# Patient Record
Sex: MALE | Race: WHITE | NOT HISPANIC OR LATINO | Employment: FULL TIME | URBAN - METROPOLITAN AREA
[De-identification: names, ages, dates, MRNs, and addresses within clinical notes are randomized per-mention and may not be internally consistent; named-entity substitution may affect disease eponyms.]

---

## 2017-01-06 ENCOUNTER — TRANSFERRED RECORDS (OUTPATIENT)
Dept: HEALTH INFORMATION MANAGEMENT | Facility: CLINIC | Age: 37
End: 2017-01-06

## 2017-02-13 ENCOUNTER — TRANSFERRED RECORDS (OUTPATIENT)
Dept: HEALTH INFORMATION MANAGEMENT | Facility: CLINIC | Age: 37
End: 2017-02-13

## 2017-03-09 ENCOUNTER — OFFICE VISIT (OUTPATIENT)
Dept: PEDIATRICS | Facility: CLINIC | Age: 37
End: 2017-03-09
Payer: COMMERCIAL

## 2017-03-09 VITALS
BODY MASS INDEX: 32.58 KG/M2 | WEIGHT: 245.8 LBS | OXYGEN SATURATION: 99 % | SYSTOLIC BLOOD PRESSURE: 126 MMHG | DIASTOLIC BLOOD PRESSURE: 82 MMHG | HEART RATE: 69 BPM | TEMPERATURE: 97 F | HEIGHT: 73 IN

## 2017-03-09 DIAGNOSIS — M54.16 LEFT LUMBAR RADICULOPATHY: Primary | ICD-10-CM

## 2017-03-09 PROCEDURE — 99214 OFFICE O/P EST MOD 30 MIN: CPT | Performed by: INTERNAL MEDICINE

## 2017-03-09 RX ORDER — PREDNISONE 20 MG/1
40 TABLET ORAL DAILY
Qty: 10 TABLET | Refills: 0 | Status: SHIPPED | OUTPATIENT
Start: 2017-03-09 | End: 2017-03-14

## 2017-03-09 RX ORDER — IBUPROFEN 200 MG
800 TABLET ORAL EVERY 4 HOURS PRN
COMMUNITY
End: 2017-09-30

## 2017-03-09 NOTE — PROGRESS NOTES
"  SUBJECTIVE:                                                    Barrington Cárdenas is a 36 year old male who presents to clinic today for the following health issues:      Back Pain      Duration: 1 month        Specific cause: unsure    Description:   Location of pain: low back left  Character of pain: dull ache and intermittent  Pain radiation:radiates into the left buttocks and radiates into the left leg. More of a tingling sensation  New numbness or weakness in legs, not attributed to pain:  YES    Intensity: Currently 0/10, At its worst 5/10    History:   Pain interferes with job: YES  History of back problems: last summer  Any previous MRI or X-rays: None  Sees a specialist for back pain:  No  Therapies tried without relief: none    Alleviating factors:   Improved by: chiropractor and NSAIDs      Precipitating factors:  Worsened by: Standing and Sitting    Functional and Psychosocial Screen (Kaylin STarT Back):      Most recent score:    KAYLIN START BACK TOTAL SCORE 3/9/2017   Total Score (all 9) 4          Accompanying Signs & Symptoms:         Began on 2/18/17; back aches resumed, despite having seen chiropracter.      Pain on left flank and buttock, as well as tingling down left leg.  Feels like foot is asleep.  This past week then developed some worsening symptoms, felt \"not right.\"  Never had this before.  Tried otc ibuprofen only; alternating hot and cold. Pain has improved.      No history of trauma or sports injury; did have whiplash in 2003.      WOrks at a desk job.    Problem list and histories reviewed & adjusted, as indicated.  Additional history: as documented    Patient Active Problem List   Diagnosis     Tick bite     Past Surgical History   Procedure Laterality Date     Surgical history of -        Tonsils, age 3     Tonsillectomy         Social History   Substance Use Topics     Smoking status: Never Smoker     Smokeless tobacco: Former User     Quit date: 8/10/2005     Alcohol use Yes      " "Comment: socially     Family History   Problem Relation Age of Onset     Family history unknown: Yes         Current Outpatient Prescriptions   Medication Sig Dispense Refill     Cyanocobalamin (VITAMIN B 12 PO)        ibuprofen (ADVIL/MOTRIN) 200 MG tablet Take 800 mg by mouth every 4 hours as needed for mild pain       predniSONE (DELTASONE) 20 MG tablet Take 2 tablets (40 mg) by mouth daily for 5 days 10 tablet 0     calcium carbonate (OS-MISA 500 MG [place]. [place]) 500 MG tablet Take 500 mg by mouth 2 times daily       OMEPRAZOLE PO        No Known Allergies  BP Readings from Last 3 Encounters:   03/09/17 126/82   08/30/16 128/82   04/11/16 128/86    Wt Readings from Last 3 Encounters:   03/09/17 245 lb 12.8 oz (111.5 kg)   08/30/16 246 lb 3.2 oz (111.7 kg)   04/11/16 245 lb 6 oz (111.3 kg)                  Labs reviewed in EPIC    Reviewed and updated as needed this visit by clinical staff  Tobacco  Allergies  Meds  Med Hx  Surg Hx  Fam Hx  Soc Hx      Reviewed and updated as needed this visit by Provider         ROS:  C: NEGATIVE for fever, chills, change in weight  E/M: NEGATIVE for ear, mouth and throat problems  R: NEGATIVE for significant cough or SOB  CV: NEGATIVE for chest pain, palpitations or peripheral edema    OBJECTIVE:                                                    /82 (BP Location: Right arm, Patient Position: Chair, Cuff Size: Adult Large)  Pulse 69  Temp 97  F (36.1  C) (Tympanic)  Ht 6' 0.5\" (1.842 m)  Wt 245 lb 12.8 oz (111.5 kg)  SpO2 99%  BMI 32.88 kg/m2  Body mass index is 32.88 kg/(m^2).   GENERAL: healthy, alert, well nourished, well hydrated, no distress  HENT: ear canals- normal; TMs- normal; Nose- normal; Mouth- no ulcers, no lesions  NECK: no tenderness, no adenopathy, no asymmetry, no masses, no stiffness; thyroid- normal to palpation  RESP: lungs clear to auscultation - no rales, no rhonchi, no wheezes  CV: regular rates and rhythm, normal S1 S2, no S3 or S4 and no murmur, " no click or rub -  ABDOMEN: soft, no tenderness, no  hepatosplenomegaly, no masses, normal bowel sounds    Diagnostic test results:  Diagnostic Test Results:  none      ASSESSMENT/PLAN:                                                    1. Left lumbar radiculopathy    Consider MRI for any progression of weakness or failure to improve after 1 month.     Patient Instructions   Begin prednisone 40 mg daily for 5 days.    Then start Aleve (naproxen) 440 mg twice daily for 1 month.    Begin physical therapy; they will call you.    Marv Thao MD  Internal Medicine and Pediatrics        - predniSONE (DELTASONE) 20 MG tablet; Take 2 tablets (40 mg) by mouth daily for 5 days  Dispense: 10 tablet; Refill: 0  - [facility] PT, HAND, AND CHIROPRACTIC REFERRAL      See Patient Instructions    Marv Thao MD  [facility]

## 2017-03-09 NOTE — MR AVS SNAPSHOT
After Visit Summary   3/9/2017    Barrington Cárdenas    MRN: [id number]           Patient Information     Date Of Birth          1980        Visit Information        Provider Department      3/9/2017 11:00 AM Marv Thao MD [facility]        Today's Diagnoses     Left lumbar radiculopathy    -  1      Care Instructions    Begin prednisone 40 mg daily for 5 days.    Then start Aleve (naproxen) 440 mg twice daily for 1 month.    Begin physical therapy; they will call you.    Marv Thao MD  Internal Medicine and Pediatrics           Follow-ups after your visit        Additional Services     [facility] PT, HAND, AND CHIROPRACTIC REFERRAL       **This order will print in the [facility] Scheduling Office**    Physical Therapy, Hand Therapy and Chiropractic Care are available through:    *[facility]  *[facility]  *[facility]    Call one number to schedule at any of the above locations: [phone].    Your provider has referred you to: [facility] - PT and/or Chiropractic Care determined by clinical presentation at [facility] or [facility] Initial Visit    Indication/Reason for Referral: Low Back Pain and with radicular symptoms.   Onset of Illness: 1 mo  Therapy Orders: Evaluate and Treat  Special Programs: None  Special Request: None    Kaylin Sub-Score (Q5-9): 3 (03/09/17 1107)  Kaylin Total Score (all 9): 4 (03/09/17 1107)    Additional Comments for the Therapist or Chiropractor:     Please be aware that coverage of these services is subject to the terms and limitations of your health insurance plan.  Call member services at your health plan with any benefit or coverage questions.      Please bring the following to your appointment:    *Your personal calendar for scheduling future appointments  *Comfortable clothing                  Who to contact     If you have questions or need follow up information about today's clinic visit or your  "schedule please contact [facility] directly at [phone].  Normal or non-critical lab and imaging results will be communicated to you by MyChart, letter or phone within 4 business days after the clinic has received the results. If you do not hear from us within 7 days, please contact the clinic through WorldEscape[employer] or phone. If you have a critical or abnormal lab result, we will notify you by phone as soon as possible.  Submit refill requests through [employer] or call your pharmacy and they will forward the refill request to us. Please allow 3 business days for your refill to be completed.          Additional Information About Your Visit        WorldEscapeharSyrmo Information     [employer] gives you secure access to your electronic health record. If you see a primary care provider, you can also send messages to your care team and make appointments. If you have questions, please call your primary care clinic.  If you do not have a primary care provider, please call [phone] and they will assist you.        Care EveryWhere ID     This is your Care EveryWhere ID. This could be used by other organizations to access your [facility] medical records  [id number]        Your Vitals Were     Pulse Temperature Height Pulse Oximetry BMI (Body Mass Index)       69 97  F (36.1  C) (Tympanic) 6' 0.5\" (1.842 m) 99% 32.88 kg/m2        Blood Pressure from Last 3 Encounters:   03/09/17 126/82   08/30/16 128/82   04/11/16 128/86    Weight from Last 3 Encounters:   03/09/17 245 lb 12.8 oz (111.5 kg)   08/30/16 246 lb 3.2 oz (111.7 kg)   04/11/16 245 lb 6 oz (111.3 kg)              We Performed the Following     [facility] PT, HAND, AND CHIROPRACTIC REFERRAL          Today's Medication Changes          These changes are accurate as of: 3/9/17 11:23 AM.  If you have any questions, ask your nurse or doctor.               Start taking these medicines.        Dose/Directions    predniSONE 20 MG tablet   Commonly known as:  DELTASONE   Used for:  Left " lumbar radiculopathy   Started by:  Marv Thao MD        Dose:  40 mg   Take 2 tablets (40 mg) by mouth daily for 5 days   Quantity:  10 tablet   Refills:  0            Where to get your medicines      These medications were sent to [facility] - [place], [place] - [address] AT [facility] [address] [facility] [address]  [address], [facility] [address]     Phone:  [phone]     predniSONE 20 MG tablet                Primary Care Provider Office Phone # Fax #    Marv Thao [phone]       [facility] [address] [place] [address] [address] [place] [address]        Thank you!     Thank you for choosing [facility]  for your care. Our goal is always to provide you with excellent care. Hearing back from our patients is one way we can continue to improve our services. Please take a few minutes to complete the written survey that you may receive in the mail after your visit with us. Thank you!             Your Updated Medication List - Protect others around you: Learn how to safely use, store and throw away your medicines at www.disposemymeds.org.          This list is accurate as of: 3/9/17 11:23 AM.  Always use your most recent med list.                   Brand Name Dispense Instructions for use    calcium carbonate 500 MG tablet    OS-MISA 500 mg [place]. [place]     Take 500 mg by mouth 2 times daily       ibuprofen 200 MG tablet    ADVIL/MOTRIN     Take 800 mg by mouth every 4 hours as needed for mild pain       OMEPRAZOLE PO          predniSONE 20 MG tablet    DELTASONE    10 tablet    Take 2 tablets (40 mg) by mouth daily for 5 days       VITAMIN B 12 PO

## 2017-03-09 NOTE — NURSING NOTE
"Chief Complaint   Patient presents with     Back Pain       Initial /82 (BP Location: Right arm, Patient Position: Chair, Cuff Size: Adult Large)  Pulse 69  Temp 97  F (36.1  C) (Tympanic)  Ht 6' 0.5\" (1.842 m)  Wt 245 lb 12.8 oz (111.5 kg)  SpO2 99%  BMI 32.88 kg/m2 Estimated body mass index is 32.88 kg/(m^2) as calculated from the following:    Height as of this encounter: 6' 0.5\" (1.842 m).    Weight as of this encounter: 245 lb 12.8 oz (111.5 kg).  Medication Reconciliation: complete   Estee Tom LPN      "

## 2017-03-09 NOTE — PATIENT INSTRUCTIONS
Begin prednisone 40 mg daily for 5 days.    Then start Aleve (naproxen) 440 mg twice daily for 1 month.    Begin physical therapy; they will call you.    Marv Thao MD  Internal Medicine and Pediatrics

## 2017-03-15 ENCOUNTER — THERAPY VISIT (OUTPATIENT)
Dept: PHYSICAL THERAPY | Facility: CLINIC | Age: 37
End: 2017-03-15
Payer: COMMERCIAL

## 2017-03-15 DIAGNOSIS — M54.50 LUMBAGO: Primary | ICD-10-CM

## 2017-03-15 PROCEDURE — 97110 THERAPEUTIC EXERCISES: CPT | Mod: GP | Performed by: PHYSICAL THERAPIST

## 2017-03-15 PROCEDURE — 97161 PT EVAL LOW COMPLEX 20 MIN: CPT | Mod: GP | Performed by: PHYSICAL THERAPIST

## 2017-03-15 NOTE — MR AVS SNAPSHOT
After Visit Summary   3/15/2017    Barrington Cárdenas    MRN: [id number]           Patient Information     Date Of Birth          1980        Visit Information        Provider Department      3/15/2017 7:30 AM Maurilio Meng PT [facility]        Today's Diagnoses     Lumbago    -  1       Follow-ups after your visit        Who to contact     If you have questions or need follow up information about today's clinic visit or your schedule please contact [facility] directly at [phone].  Normal or non-critical lab and imaging results will be communicated to you by 10Sixhart, letter or phone within 4 business days after the clinic has received the results. If you do not hear from us within 7 days, please contact the clinic through Ch[employer] or phone. If you have a critical or abnormal lab result, we will notify you by phone as soon as possible.  Submit refill requests through [employer] or call your pharmacy and they will forward the refill request to us. Please allow 3 business days for your refill to be completed.          Additional Information About Your Visit        MyChart Information     [employer] gives you secure access to your electronic health record. If you see a primary care provider, you can also send messages to your care team and make appointments. If you have questions, please call your primary care clinic.  If you do not have a primary care provider, please call [phone] and they will assist you.        Care EveryWhere ID     This is your Care EveryWhere ID. This could be used by other organizations to access your [facility] medical records  [id number]         Blood Pressure from Last 3 Encounters:   03/09/17 126/82   08/30/16 128/82   04/11/16 128/86    Weight from Last 3 Encounters:   03/09/17 111.5 kg (245 lb 12.8 oz)   08/30/16 111.7 kg (246 lb 3.2 oz)   04/11/16 111.3 kg (245 lb 6 oz)              We Performed the Following      HC PT EVAL, LOW COMPLEXITY     [facility] INITIAL EVAL REPORT     THERAPEUTIC EXERCISES        Primary Care Provider Office Phone # Fax #    Marv Thao [phone]       [address] [place] [address]        Thank you!     Thank you for choosing [facility]  for your care. Our goal is always to provide you with excellent care. Hearing back from our patients is one way we can continue to improve our services. Please take a few minutes to complete the written survey that you may receive in the mail after your visit with us. Thank you!             Your Updated Medication List - Protect others around you: Learn how to safely use, store and throw away your medicines at www.disposemymeds.org.          This list is accurate as of: 3/15/17  8:07 AM.  Always use your most recent med list.                   Brand Name Dispense Instructions for use    calcium carbonate 500 MG tablet    OS-MISA 500 mg [place]. [place]     Take 500 mg by mouth 2 times daily       ibuprofen 200 MG tablet    ADVIL/MOTRIN     Take 800 mg by mouth every 4 hours as needed for mild pain       OMEPRAZOLE PO          VITAMIN B 12 PO

## 2017-03-15 NOTE — PROGRESS NOTES
Subjective:    Barrington Cárdenas is a 36 year old male with a lumbar condition.  Condition occurred with:  Insidious onset.  Condition occurred: for unknown reasons.  This is a new condition  Left low back pain that radiates down the left leg to the foot.   Started around early Feb 2017.  Two kids 5 and 3 yo.    Patient reports that he felt a decrease in the   Patient initially felt something tighten when he was playing with his kids.  That weekend was ice fishing and doing a lot of bending.  This summer previous low back pain while fishing.  Went to the [facility] once and felt a lot better.  Hobbies: fishing, outdoors.  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   Started prednisone on Friday.  On Tuesday was sitting and arched his back felt the back go into place and felt a lot better.  .    Patient reports pain:  Lumbar spine left.    Quality: stiff. and is intermittent and reported as 2/10.     Symptoms are exacerbated by bending and sitting and relieved by activity/movement.  Since onset symptoms are rapidly improving.        General health as reported by patient is excellent.    Medical allergies: no.  Other surgeries include:  No.  Current medications:  None as reported by the patient.    Patient is working in normal job without restrictions.  Primary job tasks include:  Prolonged sitting (computer work).    Barriers include:  None as reported by the patient.    Red flags:  None as reported by the patient.                      Objective:    System         Lumbar/SI Evaluation  ROM:  AROM Lumbar: normal    Strength: glute medius 5/5, glute max 5/5 ,hip flexion 5/5, lower abs 4+/5.    Lumbar Myotomes:  normal            Lumbar DTR's:  not assessed      Cord Signs:  normal    Lumbar Dermtomes:  normal                                                                       General     ROS    Assessment/Plan:      Patient is a 36 year old male with lumbar complaints.    Patient has the following  significant findings with corresponding treatment plan.                Diagnosis 1:  Resolved lumbar deragement     Therapy Evaluation Codes:   1) History comprised of:   Personal factors that impact the plan of care:      None.    Comorbidity factors that impact the plan of care are:      None.     Medications impacting care: None.  2) Examination of Body Systems comprised of:   Body structures and functions that impact the plan of care:      Lumbar spine.   Activity limitations that impact the plan of care are:      Sitting.  3) Clinical presentation characteristics are:   Stable/Uncomplicated.  4) Decision-Making    Low complexity using standardized patient assessment instrument and/or measureable assessment of functional outcome.  Cumulative Therapy Evaluation is: Low complexity.    Previous and current functional limitations:  (See Goal Flow Sheet for this information)    Short term and Long term goals: (See Goal Flow Sheet for this information)     Communication ability:  Patient appears to be able to clearly communicate and understand verbal and written communication and follow directions correctly.  Treatment Explanation - The following has been discussed with the patient:   RX ordered/plan of care  Anticipated outcomes  Possible risks and side effects  This patient would benefit from PT intervention to resume normal activities.   Rehab potential is excellent.    Frequency:  1 X week, once daily  Duration:  for 1 visits  Discharge Plan:  Achieve all LTG.  Independent in home treatment program.  Reach maximal therapeutic benefit.    Please refer to the daily flowsheet for treatment today, total treatment time and time spent performing 1:1 timed codes.

## 2017-04-07 ENCOUNTER — OFFICE VISIT (OUTPATIENT)
Dept: PEDIATRICS | Facility: CLINIC | Age: 37
End: 2017-04-07
Payer: COMMERCIAL

## 2017-04-07 VITALS
BODY MASS INDEX: 32.86 KG/M2 | TEMPERATURE: 97.2 F | WEIGHT: 247.9 LBS | HEART RATE: 86 BPM | OXYGEN SATURATION: 97 % | SYSTOLIC BLOOD PRESSURE: 118 MMHG | HEIGHT: 73 IN | DIASTOLIC BLOOD PRESSURE: 78 MMHG

## 2017-04-07 DIAGNOSIS — M54.42 ACUTE BILATERAL LOW BACK PAIN WITH LEFT-SIDED SCIATICA: ICD-10-CM

## 2017-04-07 DIAGNOSIS — R30.0 DYSURIA: Primary | ICD-10-CM

## 2017-04-07 LAB
ALBUMIN UR-MCNC: ABNORMAL MG/DL
APPEARANCE UR: CLEAR
BILIRUB UR QL STRIP: NEGATIVE
COLOR UR AUTO: YELLOW
GLUCOSE UR STRIP-MCNC: NEGATIVE MG/DL
HGB UR QL STRIP: NEGATIVE
KETONES UR STRIP-MCNC: ABNORMAL MG/DL
LEUKOCYTE ESTERASE UR QL STRIP: NEGATIVE
MUCOUS THREADS #/AREA URNS LPF: PRESENT /LPF
NITRATE UR QL: NEGATIVE
PH UR STRIP: 6.5 PH (ref 5–7)
RBC #/AREA URNS AUTO: ABNORMAL /HPF (ref 0–2)
SP GR UR STRIP: 1.02 (ref 1–1.03)
URN SPEC COLLECT METH UR: ABNORMAL
UROBILINOGEN UR STRIP-ACNC: 1 EU/DL (ref 0.2–1)
WBC #/AREA URNS AUTO: ABNORMAL /HPF (ref 0–2)

## 2017-04-07 PROCEDURE — G0103 PSA SCREENING: HCPCS | Performed by: NURSE PRACTITIONER

## 2017-04-07 PROCEDURE — 99214 OFFICE O/P EST MOD 30 MIN: CPT | Performed by: NURSE PRACTITIONER

## 2017-04-07 PROCEDURE — 81001 URINALYSIS AUTO W/SCOPE: CPT | Performed by: NURSE PRACTITIONER

## 2017-04-07 NOTE — NURSING NOTE
"Chief Complaint   Patient presents with     RECHECK     1 month follow up for back problem       Initial /78 (Cuff Size: Adult Large)  Pulse 86  Temp 97.2  F (36.2  C) (Tympanic)  Ht 6' 0.5\" (1.842 m)  Wt 247 lb 14.4 oz (112.4 kg)  SpO2 97%  BMI 33.16 kg/m2 Estimated body mass index is 33.16 kg/(m^2) as calculated from the following:    Height as of this encounter: 6' 0.5\" (1.842 m).    Weight as of this encounter: 247 lb 14.4 oz (112.4 kg).  Medication Reconciliation: complete   Jacqueline Jeffery CMA    "

## 2017-04-07 NOTE — MR AVS SNAPSHOT
After Visit Summary   4/7/2017    Barrington Cárdenas    MRN: [id number]           Patient Information     Date Of Birth          1980        Visit Information        Provider Department      4/7/2017 2:00 PM Jaylene Oropeza APRN CNP [facility]        Today's Diagnoses     Dysuria    -  1    Acute bilateral low back pain with left-sided sciatica          Care Instructions    1. [facility] Scheduling [phone]          Follow-ups after your visit        Future tests that were ordered for you today     Open Future Orders        Priority Expected Expires Ordered    MR Lumbar Spine w/o Contrast Routine  4/7/2018 4/7/2017            Who to contact     If you have questions or need follow up information about today's clinic visit or your schedule please contact [facility] directly at [phone].  Normal or non-critical lab and imaging results will be communicated to you by MyChart, letter or phone within 4 business days after the clinic has received the results. If you do not hear from us within 7 days, please contact the clinic through MyChart or phone. If you have a critical or abnormal lab result, we will notify you by phone as soon as possible.  Submit refill requests through [employer] or call your pharmacy and they will forward the refill request to us. Please allow 3 business days for your refill to be completed.          Additional Information About Your Visit        MyChart Information     [employer] gives you secure access to your electronic health record. If you see a primary care provider, you can also send messages to your care team and make appointments. If you have questions, please call your primary care clinic.  If you do not have a primary care provider, please call [phone] and they will assist you.        Care EveryWhere ID     This is your Care EveryWhere ID. This could be used by other organizations to access your [facility]  "records  [id number]        Your Vitals Were     Pulse Temperature Height Pulse Oximetry BMI (Body Mass Index)       86 97.2  F (36.2  C) (Tympanic) 6' 0.5\" (1.842 m) 97% 33.16 kg/m2        Blood Pressure from Last 3 Encounters:   04/07/17 118/78   03/09/17 126/82   08/30/16 128/82    Weight from Last 3 Encounters:   04/07/17 247 lb 14.4 oz (112.4 kg)   03/09/17 245 lb 12.8 oz (111.5 kg)   08/30/16 246 lb 3.2 oz (111.7 kg)              We Performed the Following     *UA reflex to Microscopic and Culture ([place] [facility] (except [place] and [place])     PSA, screen     Urine Microscopic        Primary Care Provider Office Phone # Fax #    Marv Thao [phone]       [facility] [address] [place] [address]        Thank you!     Thank you for choosing [facility]  for your care. Our goal is always to provide you with excellent care. Hearing back from our patients is one way we can continue to improve our services. Please take a few minutes to complete the written survey that you may receive in the mail after your visit with us. Thank you!             Your Updated Medication List - Protect others around you: Learn how to safely use, store and throw away your medicines at www.disposemymeds.org.          This list is accurate as of: 4/7/17  2:57 PM.  Always use your most recent med list.                   Brand Name Dispense Instructions for use    ALEVE PO          calcium carbonate 500 MG tablet    OS-MISA 500 mg [place]. [place]     Take 500 mg by mouth 2 times daily Reported on 4/7/2017       ibuprofen 200 MG tablet    ADVIL/MOTRIN     Take 800 mg by mouth every 4 hours as needed for mild pain Reported on 4/7/2017       OMEPRAZOLE PO      Take 40 mg by mouth daily       VITAMIN B 12 PO      Reported on 4/7/2017         "

## 2017-04-07 NOTE — PROGRESS NOTES
"  SUBJECTIVE:                                                    Barrington Cárdenas is a 36 year old male who presents to clinic today for the following health issues:    Patient here for 1 month follow up of back problem - last seen 3/9/17 by Dr. Thao.  Patient states better than 1 month ago, but still having pain intermittently, more often in evenings, has been using Aleve. Sitting long periods is not good.:    Back pain since early February. LLB pain improved but still there. Less radiculopathy. Left foot drop is gone. Sitting long periods of time makes it worse. Sometimes inner upper thigh feels weak. No groin/bowel/bladder sx.    Having \"scratchiness\" of the urethra. Hx 2 bouts of prostatitis. The first was treated with abx, despite normal UA. Next one wasn't treated and resolved spontaneously. Had normal UA and psa. No STI risk. No scrotal pain. Sits a lot.    ROS: const/gu/msk/neuro otherwise negative     OBJECTIVE:  /78 (Cuff Size: Adult Large)  Pulse 86  Temp 97.2  F (36.2  C) (Tympanic)  Ht 6' 0.5\" (1.842 m)  Wt 247 lb 14.4 oz (112.4 kg)  SpO2 97%  BMI 33.16 kg/m2  CONSTITUTIONAL: Alert, well-nourished, well-groomed, NAD  RESP: Lungs CTA. No wheeze, rhonchi, rales.  CV: HRRR S1 S2 No MRG. No peripheral edema  MSK: No deformity of spine. No TTP midline lumbar spine. TTP paraspinal muscles. No TTP SI joint. 5/5 strength LEs.  NEURO: +2 DTRs. Negative SLR.       ASSESSMENT/PLAN:  (R30.0) Dysuria  (primary encounter diagnosis)  Comment: Unclear etiology. PSA and UA normal. Normal prostate exam. Possibly chronic non-infectious prostatitis.   Plan: Naproxen Sodium (ALEVE PO), *UA reflex to         Microscopic and Culture ([place] and [facility] (except [place] and [place]), Urine        Microscopic, PSA, screen        Start aleve. Warm soaks. Call if no improvement in 1 week or if sx worsen.     (M54.42) Acute bilateral low back pain with left-sided sciatica  Comment: Patient with 2 " months LBP with radiculopathy. Has had intermittent left foot drop. Only went to PT once. No cauda equina sx. Given duration and severity of sx, as well as intermittent foot drop, encouraged him to get an MRI.   Plan: MR Lumbar Spine w/o Contrast        Need to re-start PT.        Discussed supportive cares and reasons to return. Discussed reasons to seek care urgently.         Jaylene Oropeza, FNP-DNP.

## 2017-04-08 LAB — PSA SERPL-ACNC: 1.01 UG/L (ref 0–4)

## 2017-04-14 ENCOUNTER — HOSPITAL ENCOUNTER (OUTPATIENT)
Dept: MRI IMAGING | Facility: CLINIC | Age: 37
Discharge: HOME | End: 2017-04-14
Attending: NURSE PRACTITIONER | Admitting: NURSE PRACTITIONER
Payer: COMMERCIAL

## 2017-04-14 DIAGNOSIS — M54.42 ACUTE BILATERAL LOW BACK PAIN WITH LEFT-SIDED SCIATICA: ICD-10-CM

## 2017-04-14 PROCEDURE — 72148 MRI LUMBAR SPINE W/O DYE: CPT

## 2017-09-30 ENCOUNTER — OFFICE VISIT (OUTPATIENT)
Dept: URGENT CARE | Facility: URGENT CARE | Age: 37
End: 2017-09-30
Payer: COMMERCIAL

## 2017-09-30 VITALS
TEMPERATURE: 98 F | DIASTOLIC BLOOD PRESSURE: 74 MMHG | OXYGEN SATURATION: 97 % | SYSTOLIC BLOOD PRESSURE: 124 MMHG | BODY MASS INDEX: 33.27 KG/M2 | WEIGHT: 248.7 LBS | HEART RATE: 85 BPM

## 2017-09-30 DIAGNOSIS — S61.217A LACERATION OF LEFT LITTLE FINGER WITHOUT FOREIGN BODY WITHOUT DAMAGE TO NAIL, INITIAL ENCOUNTER: Primary | ICD-10-CM

## 2017-09-30 PROCEDURE — 12001 RPR S/N/AX/GEN/TRNK 2.5CM/<: CPT | Performed by: FAMILY MEDICINE

## 2017-09-30 NOTE — PROGRESS NOTES
SUBJECTIVE:     Chief Complaint   Patient presents with     Urgent Care     Laceration     left pinky finger     Barrington Cárdenas is a 36 year old right-handed male who presents to the clinic with a laceration on the left fifth finger sustained at 4 pm today.    This is a non-work-related injury.    Mechanism of injury: a pry bar accidentally cut the patient's left fifth finger. .  No numbness.  Normal ROM at the left fifth finger.  .    Associated symptoms: Denies numbness, weakness, or loss of function  Last tetanus booster within 10 years: yes.  Last tetanus booster was given on 12/7/2012.     EXAM:   The patient appears today in alert,no apparent distress distress  VITALS: /74 (BP Location: Right arm)  Pulse 85  Temp 98  F (36.7  C) (Tympanic)  Wt 248 lb 11.2 oz (112.8 kg)  SpO2 97%  BMI 33.27 kg/m2    Size of laceration: 2 centimeters (but 1 cm of this portion was dehisced).    Characteristics of the laceration: straight and superficial  Tendon function intact: yes  Sensation to light touch intact: yes  Pulses intact: not applicable  Picture included in patient's chart: no    Assessment:  Laceration at the left fifth finger    PLAN:  PROCEDURE NOTE::  Wound was locally injected with 1 cc's of Lidocaine 1% with epinephrine  Good anesthesia was obtained  Prepped and draped in the usual sterile fashion  Wound cleaned with betadine/saline solution  Wound soaked  Laceration was closed using 4 5-0 nylon interrupted sutures  After care instructions:  Keep wound clean and dry for the next 24-48 hours  Sutures out in 11 days  Signs of infection discussed today  May return to work as long as wound is kept clean and dry  Discussed the probability of scarring    Mor Lujan MD

## 2017-09-30 NOTE — NURSING NOTE
"Chief Complaint   Patient presents with     Urgent Care     Laceration     left pinky finger       Initial /74 (BP Location: Right arm)  Pulse 85  Temp 98  F (36.7  C) (Tympanic)  Wt 248 lb 11.2 oz (112.8 kg)  SpO2 97%  BMI 33.27 kg/m2 Estimated body mass index is 33.27 kg/(m^2) as calculated from the following:    Height as of 4/7/17: 6' 0.5\" (1.842 m).    Weight as of this encounter: 248 lb 11.2 oz (112.8 kg).  Medication Reconciliation: complete     Daphnie Houser CMA.............................September 30, 2017 5:18 PM       "

## 2017-09-30 NOTE — MR AVS SNAPSHOT
After Visit Summary   9/30/2017    Barrington Cárdenas    MRN: [id number]           Patient Information     Date Of Birth          1980        Visit Information        Provider Department      9/30/2017 5:05 PM Mor Lujan MD [facility]        Today's Diagnoses     Laceration of left little finger without foreign body without damage to nail, initial encounter    -  1      Care Instructions        Return in 11 days to have the stiches removed.    Extremity Laceration: Sutures, Staples, or Tape  A laceration is a cut through the skin. If it is deep, it may require stitches (sutures) or staples to close so it can heal. Minor cuts may be treated with surgical tape closures.   X-rays may be done if something may have entered the skin through the cut. You may also need a tetanus shot if you are not up to date on this vaccination.  Home care    Follow the health care provider s instructions on how to care for the cut.    Wash your hands with soap and warm water before and after caring for your wound. This is to help prevent infection.    Keep the wound clean and dry. If a bandage was applied and it becomes wet or dirty, replace it. Otherwise, leave it in place for the first 24 hours, then change it once a day or as directed.    If sutures or staples were used, clean the wound daily:    After removing the bandage, wash the area with soap and water. Use a wet cotton swab to loosen and remove any blood or crust that forms.    After cleaning, keep the wound clean and dry. Talk with your doctor before applying any antibiotic ointment to the wound. Reapply the bandage.    You may remove the bandage to shower as usual after the first 24 hours, but do not soak the area in water (no swimming) until the stitches or staples are removed.    If surgical tape closures were used, keep the area clean and dry. If it becomes wet, blot it dry with a towel.    The doctor may prescribe an antibiotic cream or  ointment to prevent infection. Do not stop taking this medication until you have finished the prescribed course or the doctor tells you to stop. The doctor may also prescribe medications for pain. Follow the doctor s instructions for taking these medications.    Avoid activities that may reopen your wound.  Follow-up care  Follow up with your health care provider. Most skin wounds heal within ten days. However, an infection may sometimes occur despite proper treatment. Therefore, check the wound daily for the signs of infection listed below. Stitches and staples should be removed within 7-14 days. If surgical tape closures were used, you may remove them after 10 days if they have not fallen off by then.   When to seek medical advice  Call your health care provider right away if any of these occur:    Wound bleeding not controlled by direct pressure    Signs of infection, including increasing pain in the wound, increasing wound redness or swelling, or pus or bad odor coming from the wound    Fever of 100.4 F (38 C) or higher or as directed by your healthcare provider    Stitches or staples come apart or fall out or surgical tape falls off before 7 days    Wound edges re-open    Wound changes colors    Numbness around the wound     Decreased movement around the injured area  Date Last Reviewed: 6/14/2015 2000-2017 [employer]. [address] [place] [address], [place] [address]. All rights reserved. This information is not intended as a substitute for professional medical care. Always follow your healthcare professional's instructions.                Follow-ups after your visit        Who to contact     If you have questions or need follow up information about today's clinic visit or your schedule please contact [facility] directly at [phone].  Normal or non-critical lab and imaging results will be communicated to you by MyChart, letter or phone within 4 business days after the clinic has  received the results. If you do not hear from us within 7 days, please contact the clinic through kaufDA or phone. If you have a critical or abnormal lab result, we will notify you by phone as soon as possible.  Submit refill requests through kaufDA or call your pharmacy and they will forward the refill request to us. Please allow 3 business days for your refill to be completed.          Additional Information About Your Visit        kaufDA Information     kaufDA gives you secure access to your electronic health record. If you see a primary care provider, you can also send messages to your care team and make appointments. If you have questions, please call your primary care clinic.  If you do not have a primary care provider, please call [phone] and they will assist you.        Care EveryWhere ID     This is your Care EveryWhere ID. This could be used by other organizations to access your [place] medical records  [id number]        Your Vitals Were     Pulse Temperature Pulse Oximetry BMI (Body Mass Index)          85 98  F (36.7  C) (Tympanic) 97% 33.27 kg/m2         Blood Pressure from Last 3 Encounters:   09/30/17 124/74   04/07/17 118/78   03/09/17 126/82    Weight from Last 3 Encounters:   09/30/17 248 lb 11.2 oz (112.8 kg)   04/07/17 247 lb 14.4 oz (112.4 kg)   03/09/17 245 lb 12.8 oz (111.5 kg)              We Performed the Following     REPAIR SUPERFICIAL, WOUND BODY < =2.5CM          Today's Medication Changes          These changes are accurate as of: 9/30/17  5:56 PM.  If you have any questions, ask your nurse or doctor.               Stop taking these medicines if you haven't already. Please contact your care team if you have questions.     ALEVE PO           ibuprofen 200 MG tablet   Commonly known as:  ADVIL/MOTRIN                    Primary Care Provider Office Phone # Fax #    Marv Thao [phone] [address] [place] [address]        Equal Access  to Services     LOUIS HAY : Vernell Melendez, pavel abarca, qanancy pickens. So [facility] [phone].    ATENCIÓN: Si habla español, tiene a irizarry disposición servicios gratuitos de asistencia lingüística. Llame al [phone].    We comply with applicable federal civil rights laws and [place] laws. We do not discriminate on the basis of race, color, national origin, age, disability, sex, sexual orientation, or gender identity.            Thank you!     Thank you for choosing [facility]  for your care. Our goal is always to provide you with excellent care. Hearing back from our patients is one way we can continue to improve our services. Please take a few minutes to complete the written survey that you may receive in the mail after your visit with us. Thank you!             Your Updated Medication List - Protect others around you: Learn how to safely use, store and throw away your medicines at www.disposemymeds.org.          This list is accurate as of: 9/30/17  5:56 PM.  Always use your most recent med list.                   Brand Name Dispense Instructions for use Diagnosis    calcium carbonate 1250 MG tablet    OS-MISA 500 mg [place]. Ca     Take 500 mg by mouth 2 times daily Reported on 4/7/2017        OMEPRAZOLE PO      Take 20 mg by mouth daily        VITAMIN B 12 PO      Reported on 4/7/2017

## 2017-09-30 NOTE — PATIENT INSTRUCTIONS
Return in 11 days to have the stiches removed.    Extremity Laceration: Sutures, Staples, or Tape  A laceration is a cut through the skin. If it is deep, it may require stitches (sutures) or staples to close so it can heal. Minor cuts may be treated with surgical tape closures.   X-rays may be done if something may have entered the skin through the cut. You may also need a tetanus shot if you are not up to date on this vaccination.  Home care    Follow the health care provider s instructions on how to care for the cut.    Wash your hands with soap and warm water before and after caring for your wound. This is to help prevent infection.    Keep the wound clean and dry. If a bandage was applied and it becomes wet or dirty, replace it. Otherwise, leave it in place for the first 24 hours, then change it once a day or as directed.    If sutures or staples were used, clean the wound daily:    After removing the bandage, wash the area with soap and water. Use a wet cotton swab to loosen and remove any blood or crust that forms.    After cleaning, keep the wound clean and dry. Talk with your doctor before applying any antibiotic ointment to the wound. Reapply the bandage.    You may remove the bandage to shower as usual after the first 24 hours, but do not soak the area in water (no swimming) until the stitches or staples are removed.    If surgical tape closures were used, keep the area clean and dry. If it becomes wet, blot it dry with a towel.    The doctor may prescribe an antibiotic cream or ointment to prevent infection. Do not stop taking this medication until you have finished the prescribed course or the doctor tells you to stop. The doctor may also prescribe medications for pain. Follow the doctor s instructions for taking these medications.    Avoid activities that may reopen your wound.  Follow-up care  Follow up with your health care provider. Most skin wounds heal within ten days. However, an infection may  sometimes occur despite proper treatment. Therefore, check the wound daily for the signs of infection listed below. Stitches and staples should be removed within 7-14 days. If surgical tape closures were used, you may remove them after 10 days if they have not fallen off by then.   When to seek medical advice  Call your health care provider right away if any of these occur:    Wound bleeding not controlled by direct pressure    Signs of infection, including increasing pain in the wound, increasing wound redness or swelling, or pus or bad odor coming from the wound    Fever of 100.4 F (38 C) or higher or as directed by your healthcare provider    Stitches or staples come apart or fall out or surgical tape falls off before 7 days    Wound edges re-open    Wound changes colors    Numbness around the wound     Decreased movement around the injured area  Date Last Reviewed: 6/14/2015 2000-2017 [employer]. [address] [place] [address]. All rights reserved. This information is not intended as a substitute for professional medical care. Always follow your healthcare professional's instructions.

## 2017-10-11 ENCOUNTER — OFFICE VISIT (OUTPATIENT)
Dept: URGENT CARE | Facility: URGENT CARE | Age: 37
End: 2017-10-11

## 2017-10-11 DIAGNOSIS — Z48.01 ENCOUNTER FOR CHANGE OR REMOVAL OF SURGICAL WOUND DRESSING: Primary | ICD-10-CM

## 2017-10-11 NOTE — MR AVS SNAPSHOT
After Visit Summary   10/11/2017    Barrington Cárdenas    MRN: [id number]           Patient Information     Date Of Birth          1980        Visit Information        Provider Department      10/11/2017 3:40 PM ROBERTO  PROVIDER [facility]        Today's Diagnoses     Encounter for change or removal of surgical wound dressing    -  1       Follow-ups after your visit        Who to contact     If you have questions or need follow up information about today's clinic visit or your schedule please contact [facility] directly at [phone].  Normal or non-critical lab and imaging results will be communicated to you by [employer], letter or phone within 4 business days after the clinic has received the results. If you do not hear from us within 7 days, please contact the clinic through Nexi or phone. If you have a critical or abnormal lab result, we will notify you by phone as soon as possible.  Submit refill requests through [employer]i or call your pharmacy and they will forward the refill request to us. Please allow 3 business days for your refill to be completed.          Additional Information About Your Visit        MyChart Information     Nexi gives you secure access to your electronic health record. If you see a primary care provider, you can also send messages to your care team and make appointments. If you have questions, please call your primary care clinic.  If you do not have a primary care provider, please call [phone] and they will assist you.        Care EveryWhere ID     This is your Care EveryWhere ID. This could be used by other organizations to access your [place] medical records  [id number]         Blood Pressure from Last 3 Encounters:   09/30/17 124/74   04/07/17 118/78   03/09/17 126/82    Weight from Last 3 Encounters:   09/30/17 248 lb 11.2 oz (112.8 kg)   04/07/17 247 lb 14.4 oz (112.4 kg)   03/09/17 245 lb 12.8 oz (111.5 kg)               We Performed the Following     Suture Removal No Charge        Primary Care Provider Office Phone # Fax #    Marv Thao [phone] [address] [place] [address]        Equal Access to Services     DELTA BHARTI : Hadwalker artur almazan nateo Sodianeali, waaxda luqadaha, qaybta kaalmada adejohnyada, nancy dobbins laShadijoni al. So [facility] [phone].    ATENCIÓN: Si habla español, tiene a irizarry disposición servicios gratuitos de asistencia lingüística. Llame al [phone].    We comply with applicable federal civil rights laws and [place] laws. We do not discriminate on the basis of race, color, national origin, age, disability, sex, sexual orientation, or gender identity.            Thank you!     Thank you for choosing [facility]  for your care. Our goal is always to provide you with excellent care. Hearing back from our patients is one way we can continue to improve our services. Please take a few minutes to complete the written survey that you may receive in the mail after your visit with us. Thank you!             Your Updated Medication List - Protect others around you: Learn how to safely use, store and throw away your medicines at www.disposemymeds.org.          This list is accurate as of: 10/11/17  3:44 PM.  Always use your most recent med list.                   Brand Name Dispense Instructions for use Diagnosis    calcium carbonate 1250 MG tablet    OS-MISA 500 mg [place]. [place]     Take 500 mg by mouth 2 times daily Reported on 4/7/2017        OMEPRAZOLE PO      Take 20 mg by mouth daily        VITAMIN B 12 PO      Reported on 4/7/2017

## 2017-10-11 NOTE — PROGRESS NOTES
S: Patient is here today for suture removal.  The patient reports no complications with wound.  Sutures were placed 11 days ago.  Sutures were placed at [facility] urgent care.    o: Wound is well healed, no signs of secondary infection.  Sutures removed without complication.    A: Laceration    P: Sutures removed, F/U PRN.

## 2017-12-03 ENCOUNTER — OFFICE VISIT (OUTPATIENT)
Dept: URGENT CARE | Facility: URGENT CARE | Age: 37
End: 2017-12-03
Payer: COMMERCIAL

## 2017-12-03 VITALS
WEIGHT: 248 LBS | BODY MASS INDEX: 33.17 KG/M2 | SYSTOLIC BLOOD PRESSURE: 130 MMHG | HEART RATE: 68 BPM | DIASTOLIC BLOOD PRESSURE: 82 MMHG | RESPIRATION RATE: 16 BRPM | OXYGEN SATURATION: 97 % | TEMPERATURE: 98.1 F

## 2017-12-03 DIAGNOSIS — J02.9 ACUTE PHARYNGITIS, UNSPECIFIED ETIOLOGY: Primary | ICD-10-CM

## 2017-12-03 LAB
DEPRECATED S PYO AG THROAT QL EIA: NORMAL
SPECIMEN SOURCE: NORMAL

## 2017-12-03 PROCEDURE — 87880 STREP A ASSAY W/OPTIC: CPT | Performed by: PHYSICIAN ASSISTANT

## 2017-12-03 PROCEDURE — 99213 OFFICE O/P EST LOW 20 MIN: CPT | Performed by: PHYSICIAN ASSISTANT

## 2017-12-03 PROCEDURE — 87081 CULTURE SCREEN ONLY: CPT | Performed by: PHYSICIAN ASSISTANT

## 2017-12-03 NOTE — MR AVS SNAPSHOT
After Visit Summary   12/3/2017    Barrington Cárdenas    MRN: [id number]           Patient Information     Date Of Birth          1980        Visit Information        Provider Department      12/3/2017 5:55 PM Lavinia Vega PA-C [facility]        Today's Diagnoses     Acute pharyngitis, unspecified etiology    -  1       Follow-ups after your visit        Who to contact     If you have questions or need follow up information about today's clinic visit or your schedule please contact [facility] directly at [phone].  Normal or non-critical lab and imaging results will be communicated to you by [employer], letter or phone within 4 business days after the clinic has received the results. If you do not hear from us within 7 days, please contact the clinic through [employer] or phone. If you have a critical or abnormal lab result, we will notify you by phone as soon as possible.  Submit refill requests through [employer] or call your pharmacy and they will forward the refill request to us. Please allow 3 business days for your refill to be completed.          Additional Information About Your Visit        MyChart Information     [employer] gives you secure access to your electronic health record. If you see a primary care provider, you can also send messages to your care team and make appointments. If you have questions, please call your primary care clinic.  If you do not have a primary care provider, please call [phone] and they will assist you.        Care EveryWhere ID     This is your Care EveryWhere ID. This could be used by other organizations to access your [place] medical records  [id number]        Your Vitals Were     Pulse Temperature Respirations Pulse Oximetry BMI (Body Mass Index)       68 98.1  F (36.7  C) (Oral) 16 97% 33.17 kg/m2        Blood Pressure from Last 3 Encounters:   12/03/17 130/82   09/30/17 124/74   04/07/17 118/78    Weight from  Last 3 Encounters:   12/03/17 248 lb (112.5 kg)   09/30/17 248 lb 11.2 oz (112.8 kg)   04/07/17 247 lb 14.4 oz (112.4 kg)              We Performed the Following     Beta strep group A culture     Strep, Rapid Screen        Primary Care Provider Office Phone # Fax #    Marv Thao [phone] [address] [place] [address]        Equal Access to Services     [facility]: Hadii aad ku hadasho Soomaali, waaxda luqadaha, qaybta kaalmada adeegyada, waxay idiin hayaan adeeg hasmukharalizzie ladru . So [facility] [phone].    ATENCIÓN: Si habla español, tiene a irizarry disposición servicios gratuitos de asistencia lingüística. [facility] [phone].    We comply with applicable federal civil rights laws and [place] laws. We do not discriminate on the basis of race, color, national origin, age, disability, sex, sexual orientation, or gender identity.            Thank you!     Thank you for choosing [facility] [place] [facility]  for your care. Our goal is always to provide you with excellent care. Hearing back from our patients is one way we can continue to improve our services. Please take a few minutes to complete the written survey that you may receive in the mail after your visit with us. Thank you!             Your Updated Medication List - Protect others around you: Learn how to safely use, store and throw away your medicines at www.disposemymeds.org.          This list is accurate as of: 12/3/17  8:39 PM.  Always use your most recent med list.                   Brand Name Dispense Instructions for use Diagnosis    calcium carbonate 1250 MG tablet    OS-MISA 500 mg [place]. Ca     Take 500 mg by mouth 2 times daily Reported on 4/7/2017        OMEPRAZOLE PO      Take 20 mg by mouth daily        VITAMIN B 12 PO      Reported on 4/7/2017

## 2017-12-04 LAB
BACTERIA SPEC CULT: NORMAL
SPECIMEN SOURCE: NORMAL

## 2017-12-04 NOTE — PROGRESS NOTES
SUBJECTIVE:  Barrington Cárdenas is a 37 year old male with a chief complaint of sore throat and right ear pain.  Daughter with strep.  No fevers.  Onset of symptoms was 1 day(s) ago.    Course of illness: gradual onset and still present.  Severity mild  Current and Associated symptoms: no other sx  Treatment measures tried include Fluids and Rest.  Predisposing factors include exposure to strep.    Past Medical History:   Diagnosis Date     Noyola's esophagus Janurary 2015     Current Outpatient Prescriptions   Medication Sig Dispense Refill     Cyanocobalamin (VITAMIN B 12 PO) Reported on 4/7/2017       calcium carbonate (OS-MISA 500 MG [place]. [place]) 500 MG tablet Take 500 mg by mouth 2 times daily Reported on 4/7/2017       OMEPRAZOLE PO Take 20 mg by mouth daily        Social History   Substance Use Topics     Smoking status: Never Smoker     Smokeless tobacco: Former User     Quit date: 8/10/2005     Alcohol use Yes      Comment: socially       ROS:  Review of systems negative except as stated above.    OBJECTIVE:   /82 (BP Location: Right arm, Patient Position: Chair, Cuff Size: Adult Large)  Pulse 68  Temp 98.1  F (36.7  C) (Oral)  Resp 16  Wt 248 lb (112.5 kg)  SpO2 97%  BMI 33.17 kg/m2  GENERAL APPEARANCE: healthy, alert and no distress  EYES: EOMI,  PERRL, conjunctiva clear  HENT: TM's normal bilaterally, nose and mouth without erythema, ulcers or lesions and oral mucous membranes moist, no erythema noted  NECK: supple, non-tender to palpation, no adenopathy noted  RESP: lungs clear to auscultation - no rales, rhonchi or wheezes  CV: regular rates and rhythm, normal S1 S2, no murmur noted  SKIN: no suspicious lesions or rashes    Rapid Strep test is negative; await throat culture results.    ASSESSMENT:   Acute pharyngitis    PLAN:   Culture pending.   Symptomatic treat with gargles, lozenges, and OTC analgesic as needed. Follow-up with primary clinic if not improving.

## 2018-10-11 ENCOUNTER — OFFICE VISIT (OUTPATIENT)
Dept: URGENT CARE | Facility: URGENT CARE | Age: 38
End: 2018-10-11
Payer: COMMERCIAL

## 2018-10-11 VITALS
OXYGEN SATURATION: 97 % | DIASTOLIC BLOOD PRESSURE: 80 MMHG | HEART RATE: 79 BPM | TEMPERATURE: 97.4 F | SYSTOLIC BLOOD PRESSURE: 128 MMHG

## 2018-10-11 DIAGNOSIS — J02.9 SORE THROAT: ICD-10-CM

## 2018-10-11 DIAGNOSIS — R05.9 COUGH: ICD-10-CM

## 2018-10-11 DIAGNOSIS — J20.8 VIRAL BRONCHITIS: Primary | ICD-10-CM

## 2018-10-11 LAB
DEPRECATED S PYO AG THROAT QL EIA: NORMAL
SPECIMEN SOURCE: NORMAL

## 2018-10-11 PROCEDURE — 87880 STREP A ASSAY W/OPTIC: CPT | Performed by: FAMILY MEDICINE

## 2018-10-11 PROCEDURE — 99213 OFFICE O/P EST LOW 20 MIN: CPT | Performed by: PHYSICIAN ASSISTANT

## 2018-10-11 PROCEDURE — 87081 CULTURE SCREEN ONLY: CPT | Performed by: PHYSICIAN ASSISTANT

## 2018-10-11 RX ORDER — BENZONATATE 200 MG/1
200 CAPSULE ORAL 3 TIMES DAILY PRN
Qty: 21 CAPSULE | Refills: 0 | Status: SHIPPED | OUTPATIENT
Start: 2018-10-11 | End: 2018-11-27

## 2018-10-11 NOTE — PROGRESS NOTES
SUBJECTIVE:    Barrington Cárdenas  presents to clinic today for evaluation of nasal congestion, clear rhinorrhea ST and an intermittent, dry, non-productive cough x 4-5 days duration.     Daughter has had viral cold. She is getting better without intervention. No known strep or mono contacts.     ROS:     HEENT: Positive nasal congestion with clear rhinorrhea.   RESP: Positive cough as per above. Despite cough, denies any severe SOB.  Denies any hx of asthma or RAD.   GI: Denies any N/V/D. No abdominal pain. Normal BM's  SKIN: Denies rash      Past Medical History:   Diagnosis Date     Noyola's esophagus Janurary 2015     Current Outpatient Prescriptions   Medication     calcium carbonate (OS-MISA 500 MG [place]. [place]) 500 MG tablet     Cyanocobalamin (VITAMIN B 12 PO)     OMEPRAZOLE PO     No current facility-administered medications for this visit.      No Known Allergies      OBJECTIVE:  /80 (BP Location: Right arm, Patient Position: Chair, Cuff Size: Adult Regular)  Pulse 79  Temp 97.4  F (36.3  C) (Tympanic)  SpO2 97%      General appearance: alert and no apparent distress  Skin color is pink and without rash.  HEENT:   Conjunctiva not injected.  Sclera clear.  Left TM is normal: no effusions, no erythema, and normal landmarks.  Right TM is normal: no effusions, no erythema, and normal landmarks.  Nasal mucosa is congestion  Oropharyngeal exam is positive for mild, diffuse, erythema.  No plaque, exudate, lesions, or ulcers.   Neck is supple, FROM. No pain or stiffness with ROM. No adenopathy  CARDIAC:NORMAL - regular rate and rhythm without murmur.  RESP: Normal - CTA without rales, rhonchi, or wheezing.  NEURO: Alert and oriented.  Normal speech and mentation.  CN II/XII grossly intact.  Gait within normal limits.        LABS:     Results for orders placed or performed in visit on 10/11/18   Strep, Rapid Screen   Result Value Ref Range    Specimen Description Throat     Rapid Strep A Screen        "NEGATIVE: No Group A streptococcal antigen detected by immunoassay, await culture report.         ASSESSMENT/PLAN:    (J20.8) Viral bronchitis  (primary encounter diagnosis)  Comment: No evidence of secondary bacterial infection. Very reassuring exam today.   benzonatate (TESSALON) 200 MG capsule    Plan: follow-up with PCP  if sxs change, worsen or fail to resolve with home comfort care measures over the next 5-7 days.    In addition to the above, viral URI \"red flag\" signs and sxs are reviewed with parent both verbally and by way of printed educational material for home review.  Parent verbalizes understanding of and agrees to the above plan.       (J02.9) Sore throat  Plan: Strep, Rapid Screen, Beta strep group A culture      (R05) Cough  Plan: benzonatate (TESSALON) 200 MG capsule                "

## 2018-10-11 NOTE — MR AVS SNAPSHOT
After Visit Summary   10/11/2018    Barrington Cárdenas    MRN: [id number]           Patient Information     Date Of Birth          1980        Visit Information        Provider Department      10/11/2018 10:50 AM Juvencio Tim PA-C [facility]        Today's Diagnoses     Viral bronchitis    -  1    Sore throat        Cough           Follow-ups after your visit        Who to contact     If you have questions or need follow up information about today's clinic visit or your schedule please contact [facility] directly at [phone].  Normal or non-critical lab and imaging results will be communicated to you by Rewardablehart, letter or phone within 4 business days after the clinic has received the results. If you do not hear from us within 7 days, please contact the clinic through Rewardablehart or phone. If you have a critical or abnormal lab result, we will notify you by phone as soon as possible.  Submit refill requests through [email or web address] or call your pharmacy and they will forward the refill request to us. Please allow 3 business days for your refill to be completed.          Additional Information About Your Visit        MyChart Information     [email or web address] gives you secure access to your electronic health record. If you see a primary care provider, you can also send messages to your care team and make appointments. If you have questions, please call your primary care clinic.  If you do not have a primary care provider, please call [phone] and they will assist you.        Care EveryWhere ID     This is your Care EveryWhere ID. This could be used by other organizations to access your [facility] medical records  [id number]        Your Vitals Were     Pulse Temperature Pulse Oximetry             79 97.4  F (36.3  C) (Tympanic) 97%          Blood Pressure from Last 3 Encounters:   10/11/18 128/80   12/03/17 130/82   09/30/17 124/74    Weight from Last 3  Encounters:   12/03/17 248 lb (112.5 kg)   09/30/17 248 lb 11.2 oz (112.8 kg)   04/07/17 247 lb 14.4 oz (112.4 kg)              We Performed the Following     Beta strep group A culture     Strep, Rapid Screen          Today's Medication Changes          These changes are accurate as of 10/11/18  3:33 PM.  If you have any questions, ask your nurse or doctor.               Start taking these medicines.        Dose/Directions    benzonatate 200 MG capsule   Commonly known as:  TESSALON   Used for:  Cough, Viral bronchitis        Dose:  200 mg   Take 1 capsule (200 mg) by mouth 3 times daily as needed for cough   Quantity:  21 capsule   Refills:  0            Where to get your medicines      These medications were sent to [facility] [address] [facility] [address]  [address] [facility] [place] [address]     Phone:  [phone]     benzonatate 200 MG capsule                Primary Care Provider Office Phone # Fax #    Marv Thao [phone] [address] [place] [address]        Equal Access to Services     [facility]: Hadii artur almazan hadnaldoo Sopaulina, waaxda luqadaha, qaybta kaalmada adejohnyada, nancy singleton . So [facility] [phone].    ATENCIÓN: Si habla español, tiene a irizarry disposición servicios gratuitos de asistencia lingüística. [facility] [phone].    We comply with applicable federal civil rights laws and [place] laws. We do not discriminate on the basis of race, color, national origin, age, disability, sex, sexual orientation, or gender identity.            Thank you!     Thank you for choosing [facility] [place] [facility]  for your care. Our goal is always to provide you with excellent care. Hearing back from our patients is one way we can continue to improve our services. Please take a few minutes to complete the written survey that you may receive in the mail after your  visit with us. Thank you!             Your Updated Medication List - Protect others around you: Learn how to safely use, store and throw away your medicines at www.disposemymeds.org.          This list is accurate as of 10/11/18  3:33 PM.  Always use your most recent med list.                   Brand Name Dispense Instructions for use Diagnosis    benzonatate 200 MG capsule    TESSALON    21 capsule    Take 1 capsule (200 mg) by mouth 3 times daily as needed for cough    Cough, Viral bronchitis       calcium carbonate 500 mg (elemental) 500 MG tablet    OS-MISA     Take 500 mg by mouth 2 times daily Reported on 4/7/2017        OMEPRAZOLE PO      Take 20 mg by mouth daily        VITAMIN B 12 PO      Reported on 4/7/2017

## 2018-10-12 LAB
BACTERIA SPEC CULT: NORMAL
SPECIMEN SOURCE: NORMAL

## 2018-11-23 ENCOUNTER — OFFICE VISIT (OUTPATIENT)
Dept: URGENT CARE | Facility: URGENT CARE | Age: 38
End: 2018-11-23
Payer: COMMERCIAL

## 2018-11-23 VITALS
SYSTOLIC BLOOD PRESSURE: 124 MMHG | OXYGEN SATURATION: 98 % | TEMPERATURE: 97.8 F | DIASTOLIC BLOOD PRESSURE: 80 MMHG | HEART RATE: 82 BPM

## 2018-11-23 DIAGNOSIS — M54.50 ACUTE MIDLINE LOW BACK PAIN WITHOUT SCIATICA: Primary | ICD-10-CM

## 2018-11-23 DIAGNOSIS — R20.0 NUMBNESS OF LOWER EXTREMITY: ICD-10-CM

## 2018-11-23 PROCEDURE — 99213 OFFICE O/P EST LOW 20 MIN: CPT | Performed by: FAMILY MEDICINE

## 2018-11-23 RX ORDER — CYCLOBENZAPRINE HCL 10 MG
5-10 TABLET ORAL 3 TIMES DAILY PRN
Qty: 30 TABLET | Refills: 1 | Status: SHIPPED | OUTPATIENT
Start: 2018-11-23 | End: 2019-01-07 | Stop reason: SINTOL

## 2018-11-23 NOTE — MR AVS SNAPSHOT
After Visit Summary   11/23/2018    Barrington Cárdenas    MRN: [id number]           Patient Information     Date Of Birth          1980        Visit Information        Provider Department      11/23/2018 12:30 PM Theron Cas[facility]        Today's Diagnoses     Acute midline low back pain without sciatica    -  1    Numbness of lower extremity          Care Instructions    Make appointment to see her primary care doctor in the next week or so    Take muscle relaxant as prescribed to help with stiffness.    Do the low back stretches that you learned from physical therapy previously.    Ifsymptoms get worse such as weakness, increasing discomfort, increasing numbness then return sooner for evaluation          Follow-ups after your visit        Who to contact     If you have questions or need follow up information about today's clinic visit or your schedule please contact [facility] directly at [phone].  Normal or non-critical lab and imaging results will be communicated to you by Beatsyhart, letter or phone within 4 business days after the clinic has received the results. If you do not hear from us within 7 days, please contact the clinic through Beatsyhart or phone. If you have a critical or abnormal lab result, we will notify you by phone as soon as possible.  Submit refill requests through NowSpots or call your pharmacy and they will forward the refill request to us. Please allow 3 business days for your refill to be completed.          Additional Information About Your Visit        MyChart Information     NowSpots gives you secure access to your electronic health record. If you see a primary care provider, you can also send messages to your care team and make appointments. If you have questions, please call your primary care clinic.  If you do not have a primary care provider, please call [phone] and they will assist you.        Care EveryWhere  ID     This is your Care EveryWhere ID. This could be used by other organizations to access your [place] medical records  [id number]        Your Vitals Were     Pulse Temperature Pulse Oximetry             82 97.8  F (36.6  C) (Tympanic) 98%          Blood Pressure from Last 3 Encounters:   11/23/18 124/80   10/11/18 128/80   12/03/17 130/82    Weight from Last 3 Encounters:   12/03/17 248 lb (112.5 kg)   09/30/17 248 lb 11.2 oz (112.8 kg)   04/07/17 247 lb 14.4 oz (112.4 kg)              Today, you had the following     No orders found for display       Primary Care Provider Office Phone # Fax #    Marv Thao [phone] [address] [place] [address]        Equal Access to Services     [facility]: Hadii artur almazan hadasho Soomaali, waaxda luqadaha, qaybta kaalmada adejohnyada, nancy singleton . So [facility] [phone].    ATENCIÓN: Si habla español, tiene a irizarry disposición servicios gratuitos de asistencia lingüística. [facility] [phone].    We comply with applicable federal civil rights laws and [place] laws. We do not discriminate on the basis of race, color, national origin, age, disability, sex, sexual orientation, or gender identity.            Thank you!     Thank you for choosing [facility]  for your care. Our goal is always to provide you with excellent care. Hearing back from our patients is one way we can continue to improve our services. Please take a few minutes to complete the written survey that you may receive in the mail after your visit with us. Thank you!             Your Updated Medication List - Protect others around you: Learn how to safely use, store and throw away your medicines at www.disposemymeds.org.          This list is accurate as of 11/23/18  2:12 PM.  Always use your most recent med list.                   Brand Name Dispense Instructions for use Diagnosis    benzonatate 200 MG capsule    TESSALON     21 capsule    Take 1 capsule (200 mg) by mouth 3 times daily as needed for cough    Cough, Viral bronchitis       calcium carbonate 500 mg (elemental) 500 MG tablet    OS-MISA     Take 500 mg by mouth 2 times daily Reported on 4/7/2017        OMEPRAZOLE PO      Take 20 mg by mouth daily        VITAMIN B 12 PO      Reported on 4/7/2017

## 2018-11-23 NOTE — PATIENT INSTRUCTIONS
Make appointment to see her primary care doctor in the next week or so    Take muscle relaxant as prescribed to help with stiffness.    Do the low back stretches that you learned from physical therapy previously.    Ifsymptoms get worse such as weakness, increasing discomfort, increasing numbness then return sooner for evaluation

## 2018-11-23 NOTE — PROGRESS NOTES
Subjective:   Barrington Cárdenas is a 38 year old male who presents for   Chief Complaint   Patient presents with     Urgent Care     Back Pain     back pain and numbness on bottom of foot x 7 days     Patient comes in for 1 week of symptoms of low back discomfort and numbness on the bottom of his foot and the posterior side of his upper thigh and the left side.  At this current time has no numbness.  Says he was on a recent trip to [place] and after getting back to his when the symptoms started.  He is experiencing discomfort and tension in his lower back area.  He has a previous history of a back injury which was instigated by participating in a fishing tournament.  Position does not aggravate his symptoms.  He has not had any episodes of tripping.  No weakness in his lower extremities.  No history of blood clots.  Is not a smoker.  No leg swelling.  No calf pain.    Patient Active Problem List    Diagnosis Date Noted     Tick bite 08/10/2012     Priority: Medium       Current Outpatient Prescriptions   Medication     calcium carbonate (OS-MISA 500 MG [place]. [place]) 500 MG tablet     Cyanocobalamin (VITAMIN B 12 PO)     cyclobenzaprine (FLEXERIL) 10 MG tablet     OMEPRAZOLE PO     benzonatate (TESSALON) 200 MG capsule     No current facility-administered medications for this visit.        ROS:  As above per HPI    Objective:   /80 (BP Location: Right arm, Patient Position: Chair, Cuff Size: Adult Regular)  Pulse 82  Temp 97.8  F (36.6  C) (Tympanic)  SpO2 98%, There is no height or weight on file to calculate BMI.  Gen:  NAD, well-nourished, sitting in chair comfortably  HEENT: EOMI, sclera anicteric, Head normocephalic, ; nares patent; moist mucous membranes  Neck: trachea midline, no thyromegaly  CV:  Hemodynamically stable,  Pulm:  no increased work of breathing  ABD: soft, non-distended  Back: intact ROM in flexion and extension, negative modified straight leg test bilaterally  MSK: 5/5 strength leg  flexion/extension and hip flexion  Neuro: 2/4 patellar reflexes bilaterally 2/4 biceps reflex bilateraly  Extrem: no cyanosis, edema or clubbing. Zhang's sign negative left side.   Skin: no obvious rashes or abnormalities  Psych: Euthymic, linear thoughts, normal rate of speech      Assessment & Plan:   Barrington Cárdenas, 38 year old male who presents with:    Acute midline low back pain without sciatica  Numbness of lower extremity  It appears a triggering event was his recent travel to [place] exam was reassuring as he features no leg weakness bilaterally and has a normal neuro exam.  Did not have any numbness during exam today.  No mechanism to suggest acute back injury or slipped disc.  Spinal stenosis unlikely as there is no aggravating factor in either direction of flexion-extension.  Isolated left leg symptoms I did exam there was no swelling of the lower extremity and no alarming symptoms to suggest DVT.  At his age recommended he have a full physical exam with his provider to follow-up on these symptoms can consider doing B12 and folate testing in addition to screen for diabetes.  Given all of his complaint with regards to tightness in his lower back I gave him a muscle relaxant and he can trial for the next week.  He has done physical therapy in the past I recommended that he do the low back stretches as he was taught previously.  - cyclobenzaprine (FLEXERIL) 10 MG tablet  Dispense: 30 tablet; Refill: 1        Theron Casiano MD   [facility]     Options for treatment and/or follow-up care were reviewed with the patient. Barrington Cárdenas and/or legal guardian was engaged and actively involved in the decision making process. Patient/guardian verbalized understanding of the options discussed and was satisfied with the final plan.

## 2018-11-27 ENCOUNTER — OFFICE VISIT (OUTPATIENT)
Dept: PEDIATRICS | Facility: CLINIC | Age: 38
End: 2018-11-27
Payer: COMMERCIAL

## 2018-11-27 VITALS
TEMPERATURE: 96.6 F | BODY MASS INDEX: 33.04 KG/M2 | SYSTOLIC BLOOD PRESSURE: 128 MMHG | HEART RATE: 100 BPM | WEIGHT: 247.04 LBS | OXYGEN SATURATION: 98 % | DIASTOLIC BLOOD PRESSURE: 86 MMHG

## 2018-11-27 DIAGNOSIS — M54.16 LUMBAR RADICULOPATHY: Primary | ICD-10-CM

## 2018-11-27 DIAGNOSIS — R30.0 DYSURIA: ICD-10-CM

## 2018-11-27 LAB
ALBUMIN UR-MCNC: NEGATIVE MG/DL
APPEARANCE UR: CLEAR
BILIRUB UR QL STRIP: NEGATIVE
COLOR UR AUTO: YELLOW
GLUCOSE UR STRIP-MCNC: NEGATIVE MG/DL
HGB UR QL STRIP: ABNORMAL
KETONES UR STRIP-MCNC: NEGATIVE MG/DL
LEUKOCYTE ESTERASE UR QL STRIP: NEGATIVE
NITRATE UR QL: NEGATIVE
PH UR STRIP: 7 PH (ref 5–7)
RBC #/AREA URNS AUTO: NORMAL /HPF
SOURCE: ABNORMAL
SP GR UR STRIP: 1.01 (ref 1–1.03)
UROBILINOGEN UR STRIP-ACNC: 0.2 EU/DL (ref 0.2–1)
WBC #/AREA URNS AUTO: NORMAL /HPF

## 2018-11-27 PROCEDURE — 99214 OFFICE O/P EST MOD 30 MIN: CPT | Performed by: INTERNAL MEDICINE

## 2018-11-27 PROCEDURE — 87491 CHLMYD TRACH DNA AMP PROBE: CPT | Performed by: INTERNAL MEDICINE

## 2018-11-27 PROCEDURE — 87591 N.GONORRHOEAE DNA AMP PROB: CPT | Performed by: INTERNAL MEDICINE

## 2018-11-27 PROCEDURE — 87086 URINE CULTURE/COLONY COUNT: CPT | Performed by: INTERNAL MEDICINE

## 2018-11-27 PROCEDURE — 81001 URINALYSIS AUTO W/SCOPE: CPT | Performed by: INTERNAL MEDICINE

## 2018-11-27 RX ORDER — PREDNISONE 20 MG/1
40 TABLET ORAL DAILY
Qty: 10 TABLET | Refills: 0 | Status: SHIPPED | OUTPATIENT
Start: 2018-11-27 | End: 2019-04-03

## 2018-11-27 NOTE — MR AVS SNAPSHOT
After Visit Summary   11/27/2018    Barrington Cárdenas    MRN: [id number]           Patient Information     Date Of Birth          1980        Visit Information        Provider Department      11/27/2018 3:20 PM Marv Thao MD [facility]        Today's Diagnoses     Dysuria    -  1    Lumbar radiculopathy          Care Instructions    Let's do 5 days of prednisone.    After that is done, Begin Aleve (naproxen) 440 mg twice daily for 2 solid weeks.    Continue with exercises.               Low Back Pain            What is low back pain?   Low back pain is pain and stiffness in the lower back. It is one of the most common reasons people miss work.   How does it occur?   Your lower back is called your lumbar spine. It is made up of 5 bones called lumbar vertebrae. In between the vertebrae are shock absorbers called disks. Back pain can occur from an injury to the vertebrae or when a disk bulges or herniates.   Low back pain is usually caused when a ligament or muscle holding a vertebra in its proper position is strained. Vertebrae are bones that make up the spinal column through which the spinal cord passes. When these muscles or ligaments become weak or strained, the spine loses its stability, resulting in pain.   Low back pain can occur if your job involves lifting and carrying heavy objects, or if you spend a lot of time sitting or standing in one position or bending over. It can be caused by a fall or by unusually strenuous exercise. It can be brought on by the tension and stress that cause headaches in some people. It can even be brought on by violent sneezing or coughing.   People who are overweight may have low back pain because of the added stress on their back.   Back pain may occur when the muscles, joints, bones, and connective tissues of the back become inflamed as a result of an infection or an immune system problem. Arthritic disorders as well as some congenital and  degenerative conditions may cause back pain.   Back pain accompanied by loss of bladder or bowel control, trouble moving your legs, or numbness or tingling in your arms or legs requires immediate medical treatment.   What are the symptoms?   Symptoms include:   pain in the back or legs   stiffness, spasm, or limited motion   The pain may be constant or may happen only in certain positions. It may get worse when you cough, sneeze, bend, twist, or strain during a bowel movement. The pain may be in only one spot or may spread to other areas, most commonly down the buttocks and into the back of the thigh.   A low back strain typically does not produce pain past the knee into the calf or foot. Tingling or numbness in the calf or foot may indicate a herniated disk or pinched nerve.   Be sure to see your healthcare provider if:   You have weakness in your leg, especially if you cannot lift your foot, because this may be a sign of nerve damage.   You have new bowel or bladder problems as well as back pain, which may be a sign of severe injury to your spinal cord.   You have pain that gets worse despite treatment.   How is it diagnosed?   Your healthcare provider will review your medical history and examine you. You may have X-rays, an MRI, CT scan, or a bone scan.   How is it treated?   To treat this condition:   Put an ice pack, gel pack, or package of frozen vegetables, wrapped in a cloth on the area every 3 to 4 hours, for up to 20 minutes at a time for the first 2 or 3 days.   Use a heating pad or hot water bottle. Don't let the heating pad get too hot, and don't fall asleep with it. You could get a burn.   Rest in bed on a firm mattress. Often it helps to lie on your back with your knees raised on a pillow. However, some people prefer to lie on their side with their knees bent. It's best to try to stay active, so try not to rest in bed longer than 1 to 2 days.   Take muscle relaxants as recommended by your healthcare  provider.   Take an anti-inflammatory such as ibuprofen, or other medicine as directed by your provider. Nonsteroidal anti-inflammatory medicines (NSAIDs) may cause stomach bleeding and other problems. These risks increase with age. Read the label and take as directed. Unless recommended by your healthcare provider, do not take for more than 10 days.   Get a back massage by a trained person.   Wear a belt or corset to support your back.   Do the exercises recommended by your provider. Your provider may also prescribe physical therapy.   Talk with a counselor, if your back pain is related to tension caused by emotional problems.   When the pain is gone, ask your healthcare provider about starting an exercise program such as the following:   Exercise moderately every day, using stretching and warm-up exercises suggested by your provider or physical therapist.   Exercise vigorously for about 30 minutes 3 times a week by walking, swimming, using a stationary bicycle, or doing low-impact aerobics.   Exercising regularly will not only help your back, it will also help keep you healthier overall.   How long will the effects last?   The effects of back pain last as long as the cause exists or until your body recovers from the strain, usually a day or two but sometimes weeks.   How can I take care of myself?   In addition to the treatment described above, keep in mind these suggestions:   Practice good posture. Stand with your head up, shoulders straight, chest forward, weight balanced evenly on both feet, and pelvis tucked in.   Lose weight if you are overweight   Keep your core muscles strong. These are your abdominal and back muscles.   Sleep without a pillow under your head.   Pain is the best way to  the pace you should set in increasing your activity and exercise. Minor discomfort, stiffness, soreness, and mild aches need not interfere with activity. However, limit your activities temporarily if:   Your symptoms  return.   The pain increases when you are more active.   The pain increases within 24 hours after a new or higher level of activity.   When can I return to my normal activities?   Everyone recovers from an injury at a different rate. Return to your activities depends on how soon your back recovers, not by how many days or weeks it has been since your injury has occurred. In general, the longer you have symptoms before you start treatment, the longer it will take to get better. The goal is to return to your normal activities as soon as is safely possible. If you return too soon you may worsen your injury.   It is important that you have fully recovered from your low back pain before you return to any strenuous activity. You must be able to have the same range of motion that you had before your injury. You must be able to walk and twist without pain.   What can I do to help prevent low back pain?   You can reduce the strain on your back by doing the following:   Don't push with your arms when you move a heavy object. Turn around and push backwards so the strain is taken by your legs.   Whenever you sit, sit in a straight-backed chair and hold your spine against the back of the chair.   Bend your knees and hips and keep your back straight when you lift a heavy object.   Avoid lifting heavy objects higher than your waist.   Hold packages you carry close to your body, with your arms bent.   Use a footrest for one foot when you stand or sit in one spot for a long time. This keeps your back straight.   Bend your knees when you bend over.   Sit close to the pedals when you drive and use your seat belt and a hard backrest or pillow.   Lie on your side with your knees bent when you sleep or rest. It may help to put a pillow between your knees.   Put a pillow under your knees when you sleep on your back.   Raise the foot of the bed 8 inches to discourage sleeping on your stomach unless you have other problems that require that  you keep your head elevated.   To rest your back, hold each of these positions for 5?minutes or longer:   Lie on your back, bend your knees, and put pillows under your knees.   Lie on your back on the floor with a pillow under your neck. Bend your knees to a 90-degree angle, and put your lower legs and feet on a chair.   Lie on your back, bend your knees, and bring one knee up to your chest and hold it there. Repeat with the other knee, then bring both knees to your chest. When holding your knee to your chest, grab your thigh rather than your lower leg to avoid over flexing your knee.     Published by [employer].  This content is reviewed periodically and is subject to change as new health information becomes available. The information is intended to inform and educate and is not a replacement for medical evaluation, advice, diagnosis or treatment by a healthcare professional.   Developed by Meredith Burkett RN, [place], and [employer].   ? 2010 [facility] and/or its affiliates. All Rights Reserved.           Low Back Pain Exercise          Standing hamstring stretch: Put the heel of one leg on a stool about 15 inches high. Keep your leg straight. Lean forward, bending at the hips until you feel a mild stretch in the back of your thigh. Make sure you do not roll your shoulders or bend at the waist when doing this. You want to stretch your leg, not your lower back. Hold the stretch for 15 to 30 seconds. Repeat with each leg 3 times.   Cat and camel: Get down on your hands and knees. Let your stomach sag, allowing your back to curve downward. Hold this position for 5 seconds. Then arch your back and hold for 5 seconds. Do 3 sets of 10.   Quadruped arm and leg raise: Get down on your hands and knees. Pull in your belly button and tighten your abdominal muscles to stiffen your spine. While keeping your abdominals tight, raise one arm and the opposite leg away from you. Hold this position for 5 seconds. Lower your arm  and leg slowly and change sides. Do this 10 times on each side.   Pelvic tilt: Lie on your back with your knees bent and your feet flat on the floor. Tighten your abdominal muscles and push your lower back into the floor. Hold this position for 5 seconds, then relax. Do 3 sets of 10.   Partial curl: Lie on your back with your knees bent and your feet flat on the floor. Tighten your stomach muscles. Tuck your chin to your chest. With your hands stretched out in front of you, curl your upper body forward until your shoulders clear the floor. Hold this position for 3 seconds. Don't hold your breath. It helps to breathe out as you lift your shoulders up. Relax back to the floor. Repeat 10 times. Build to 3 sets of 10. To challenge yourself, clasp your hands behind your head and keep your elbows out to the side.   Gluteal stretch: Lie on your back with both knees bent. Rest the ankle of one leg over the knee of your other leg. Grasp the thigh of the bottom leg and pull toward your chest. You will feel a stretch along the buttocks and possibly along the outside of your hip. Hold the stretch for 15 to 30 seconds. Repeat 3 times with each leg.   Extension exercise:   0. Lie face down on the floor for 5 minutes. If this hurts too much, lie face down with a pillow under your stomach. This should relieve your leg or back pain. When you can lie on your stomach for 5 minutes without a pillow, you can continue with Part B of this exercise.   0. After lying on your stomach for 5 minutes, prop yourself up on your elbows for another 5 minutes. If you can do this without having more leg or buttock pain, you can start doing part C of this exercise.   0. Lie on your stomach with your hands under your shoulders. Then press down on your hands and extend your elbows while keeping your hips flat on the floor. Hold for 1 second and lower yourself to the floor. Do 3 to 5 sets of 10 repetitions. Rest for 1 minute between sets. You should have  no pain in your legs when you do this, but it is normal to feel some pain in your lower back.   Do this exercise several times a day.   Side plank: Lie on your side with your legs, hips, and shoulders in a straight line. Prop yourself up onto your forearm so your elbow is directly under your shoulder. Lift your hips off the floor and balance on your forearm and the outside of your foot. Try to hold this position for 15 seconds, then slowly lower your hip to the ground. Switch sides and repeat. Work up to holding for 1 minute or longer. This exercise can be made easier by starting with your knees and hips flexed toward your chest.   Published by [employer].  This content is reviewed periodically and is subject to change as new health information becomes available. The information is intended to inform and educate and is not a replacement for medical evaluation, advice, diagnosis or treatment by a healthcare professional.   Written by Leah Cosme, MS, PT, and Meredith Min PT, [facility], for [facility]   ? 2010 [facility] and/or its affiliates. All Rights Reserved.         Copyright   [employer] 2011                      Follow-ups after your visit        Who to contact     If you have questions or need follow up information about today's clinic visit or your schedule please contact [facility] directly at [phone].  Normal or non-critical lab and imaging results will be communicated to you by MyChart, letter or phone within 4 business days after the clinic has received the results. If you do not hear from us within 7 days, please contact the clinic through [employer] or phone. If you have a critical or abnormal lab result, we will notify you by phone as soon as possible.  Submit refill requests through [employer] or call your pharmacy and they will forward the refill request to us. Please allow 3 business days for your refill to be completed.          Additional Information About Your  Visit        [employer] Information     [employer] gives you secure access to your electronic health record. If you see a primary care provider, you can also send messages to your care team and make appointments. If you have questions, please call your primary care clinic.  If you do not have a primary care provider, please call [phone] and they will assist you.        Care EveryWhere ID     This is your Care EveryWhere ID. This could be used by other organizations to access your [place] medical records  [id number]        Your Vitals Were     Pulse Temperature Pulse Oximetry BMI (Body Mass Index)          100 96.6  F (35.9  C) (Tympanic) 98% 33.04 kg/m2         Blood Pressure from Last 3 Encounters:   11/27/18 128/86   11/23/18 124/80   10/11/18 128/80    Weight from Last 3 Encounters:   11/27/18 247 lb 0.6 oz (112.1 kg)   12/03/17 248 lb (112.5 kg)   09/30/17 248 lb 11.2 oz (112.8 kg)              We Performed the Following     CHLAMYDIA TRACHOMATIS PCR     NEISSERIA GONORRHOEA PCR     UA reflex to Microscopic and Culture     Urine Culture Aerobic Bacterial     Urine Microscopic          Today's Medication Changes          These changes are accurate as of 11/27/18  4:03 PM.  If you have any questions, ask your nurse or doctor.               Start taking these medicines.        Dose/Directions    predniSONE 20 MG tablet   Commonly known as:  DELTASONE   Used for:  Lumbar radiculopathy   Started by:  Marv Thao MD        Dose:  40 mg   Take 2 tablets (40 mg) by mouth daily for 5 days   Quantity:  10 tablet   Refills:  0            Where to get your medicines      These medications were sent to [id number] - [facility] [place], [place] [address] AT [facility] [address]  [address], [facility] [address]     Phone:  [phone]     predniSONE 20 MG tablet                Primary Care Provider Office Phone # Fax #    Marv Thao [phone] [place] [address]        Equal Access to Services     [facility] : Hadii aad ku hadnaldochente Melendez, wacarlosda tevin, qaybta nayely bond, nancy al. So [facility] [phone].    ATENCIÓN: Si habla español, tiene a irizarry disposición servicios gratuitos de asistencia lingüística. [facility] [phone].    We comply with applicable federal civil rights laws and [place] laws. We do not discriminate on the basis of race, color, national origin, age, disability, sex, sexual orientation, or gender identity.            Thank you!     [facility]  [facility]. Our goal is always to provide you with excellent care. Hearing back from our patients is one way we can continue to improve our services. Please take a few minutes to complete the written survey that you may receive in the mail after your visit with us. Thank you!             Your Updated Medication List - Protect others around you: Learn how to safely use, store and throw away your medicines at www.disposemymeds.org.          This list is accurate as of 11/27/18  4:03 PM.  Always use your most recent med list.                   Brand Name Dispense Instructions for use Diagnosis    calcium carbonate 500 MG tablet    OS-MISA     Take 500 mg by mouth 2 times daily Reported on 4/7/2017        cyclobenzaprine 10 MG tablet    FLEXERIL    30 tablet    Take 0.5-1 tablets (5-10 mg) by mouth 3 times daily as needed for muscle spasms    Acute midline low back pain without sciatica       OMEPRAZOLE PO      Take 20 mg by mouth daily        predniSONE 20 MG tablet    DELTASONE    10 tablet    Take 2 tablets (40 mg) by mouth daily for 5 days    Lumbar radiculopathy       VITAMIN B 12 PO      Reported on 4/7/2017

## 2018-11-27 NOTE — PATIENT INSTRUCTIONS
Let's do 5 days of prednisone.    After that is done, Begin Aleve (naproxen) 440 mg twice daily for 2 solid weeks.    Continue with exercises.               Low Back Pain            What is low back pain?   Low back pain is pain and stiffness in the lower back. It is one of the most common reasons people miss work.   How does it occur?   Your lower back is called your lumbar spine. It is made up of 5 bones called lumbar vertebrae. In between the vertebrae are shock absorbers called disks. Back pain can occur from an injury to the vertebrae or when a disk bulges or herniates.   Low back pain is usually caused when a ligament or muscle holding a vertebra in its proper position is strained. Vertebrae are bones that make up the spinal column through which the spinal cord passes. When these muscles or ligaments become weak or strained, the spine loses its stability, resulting in pain.   Low back pain can occur if your job involves lifting and carrying heavy objects, or if you spend a lot of time sitting or standing in one position or bending over. It can be caused by a fall or by unusually strenuous exercise. It can be brought on by the tension and stress that cause headaches in some people. It can even be brought on by violent sneezing or coughing.   People who are overweight may have low back pain because of the added stress on their back.   Back pain may occur when the muscles, joints, bones, and connective tissues of the back become inflamed as a result of an infection or an immune system problem. Arthritic disorders as well as some congenital and degenerative conditions may cause back pain.   Back pain accompanied by loss of bladder or bowel control, trouble moving your legs, or numbness or tingling in your arms or legs requires immediate medical treatment.   What are the symptoms?   Symptoms include:   pain in the back or legs   stiffness, spasm, or limited motion   The pain may be constant or may happen only in  certain positions. It may get worse when you cough, sneeze, bend, twist, or strain during a bowel movement. The pain may be in only one spot or may spread to other areas, most commonly down the buttocks and into the back of the thigh.   A low back strain typically does not produce pain past the knee into the calf or foot. Tingling or numbness in the calf or foot may indicate a herniated disk or pinched nerve.   Be sure to see your healthcare provider if:   You have weakness in your leg, especially if you cannot lift your foot, because this may be a sign of nerve damage.   You have new bowel or bladder problems as well as back pain, which may be a sign of severe injury to your spinal cord.   You have pain that gets worse despite treatment.   How is it diagnosed?   Your healthcare provider will review your medical history and examine you. You may have X-rays, an MRI, CT scan, or a bone scan.   How is it treated?   To treat this condition:   Put an ice pack, gel pack, or package of frozen vegetables, wrapped in a cloth on the area every 3 to 4 hours, for up to 20 minutes at a time for the first 2 or 3 days.   Use a heating pad or hot water bottle. Don't let the heating pad get too hot, and don't fall asleep with it. You could get a burn.   Rest in bed on a firm mattress. Often it helps to lie on your back with your knees raised on a pillow. However, some people prefer to lie on their side with their knees bent. It's best to try to stay active, so try not to rest in bed longer than 1 to 2 days.   Take muscle relaxants as recommended by your healthcare provider.   Take an anti-inflammatory such as ibuprofen, or other medicine as directed by your provider. Nonsteroidal anti-inflammatory medicines (NSAIDs) may cause stomach bleeding and other problems. These risks increase with age. Read the label and take as directed. Unless recommended by your healthcare provider, do not take for more than 10 days.   Get a back massage  by a trained person.   Wear a belt or corset to support your back.   Do the exercises recommended by your provider. Your provider may also prescribe physical therapy.   Talk with a counselor, if your back pain is related to tension caused by emotional problems.   When the pain is gone, ask your healthcare provider about starting an exercise program such as the following:   Exercise moderately every day, using stretching and warm-up exercises suggested by your provider or physical therapist.   Exercise vigorously for about 30 minutes 3 times a week by walking, swimming, using a stationary bicycle, or doing low-impact aerobics.   Exercising regularly will not only help your back, it will also help keep you healthier overall.   How long will the effects last?   The effects of back pain last as long as the cause exists or until your body recovers from the strain, usually a day or two but sometimes weeks.   How can I take care of myself?   In addition to the treatment described above, keep in mind these suggestions:   Practice good posture. Stand with your head up, shoulders straight, chest forward, weight balanced evenly on both feet, and pelvis tucked in.   Lose weight if you are overweight   Keep your core muscles strong. These are your abdominal and back muscles.   Sleep without a pillow under your head.   Pain is the best way to  the pace you should set in increasing your activity and exercise. Minor discomfort, stiffness, soreness, and mild aches need not interfere with activity. However, limit your activities temporarily if:   Your symptoms return.   The pain increases when you are more active.   The pain increases within 24 hours after a new or higher level of activity.   When can I return to my normal activities?   Everyone recovers from an injury at a different rate. Return to your activities depends on how soon your back recovers, not by how many days or weeks it has been since your injury has occurred.  In general, the longer you have symptoms before you start treatment, the longer it will take to get better. The goal is to return to your normal activities as soon as is safely possible. If you return too soon you may worsen your injury.   It is important that you have fully recovered from your low back pain before you return to any strenuous activity. You must be able to have the same range of motion that you had before your injury. You must be able to walk and twist without pain.   What can I do to help prevent low back pain?   You can reduce the strain on your back by doing the following:   Don't push with your arms when you move a heavy object. Turn around and push backwards so the strain is taken by your legs.   Whenever you sit, sit in a straight-backed chair and hold your spine against the back of the chair.   Bend your knees and hips and keep your back straight when you lift a heavy object.   Avoid lifting heavy objects higher than your waist.   Hold packages you carry close to your body, with your arms bent.   Use a footrest for one foot when you stand or sit in one spot for a long time. This keeps your back straight.   Bend your knees when you bend over.   Sit close to the pedals when you drive and use your seat belt and a hard backrest or pillow.   Lie on your side with your knees bent when you sleep or rest. It may help to put a pillow between your knees.   Put a pillow under your knees when you sleep on your back.   Raise the foot of the bed 8 inches to discourage sleeping on your stomach unless you have other problems that require that you keep your head elevated.   To rest your back, hold each of these positions for 5?minutes or longer:   Lie on your back, bend your knees, and put pillows under your knees.   Lie on your back on the floor with a pillow under your neck. Bend your knees to a 90-degree angle, and put your lower legs and feet on a chair.   Lie on your back, bend your knees, and bring one  knee up to your chest and hold it there. Repeat with the other knee, then bring both knees to your chest. When holding your knee to your chest, grab your thigh rather than your lower leg to avoid over flexing your knee.     Published by [employer].  This content is reviewed periodically and is subject to change as new health information becomes available. The information is intended to inform and educate and is not a replacement for medical evaluation, advice, diagnosis or treatment by a healthcare professional.   Developed by Meredith Burkett RN, [place], and [employer] [facility].   ? 2010 [facility] and/or its affiliates. All Rights Reserved.           Low Back Pain Exercise          Standing hamstring stretch: Put the heel of one leg on a stool about 15 inches high. Keep your leg straight. Lean forward, bending at the hips until you feel a mild stretch in the back of your thigh. Make sure you do not roll your shoulders or bend at the waist when doing this. You want to stretch your leg, not your lower back. Hold the stretch for 15 to 30 seconds. Repeat with each leg 3 times.   Cat and camel: Get down on your hands and knees. Let your stomach sag, allowing your back to curve downward. Hold this position for 5 seconds. Then arch your back and hold for 5 seconds. Do 3 sets of 10.   Quadruped arm and leg raise: Get down on your hands and knees. Pull in your belly button and tighten your abdominal muscles to stiffen your spine. While keeping your abdominals tight, raise one arm and the opposite leg away from you. Hold this position for 5 seconds. Lower your arm and leg slowly and change sides. Do this 10 times on each side.   Pelvic tilt: Lie on your back with your knees bent and your feet flat on the floor. Tighten your abdominal muscles and push your lower back into the floor. Hold this position for 5 seconds, then relax. Do 3 sets of 10.   Partial curl: Lie on your back with your knees bent and your feet flat on the floor.  Tighten your stomach muscles. Tuck your chin to your chest. With your hands stretched out in front of you, curl your upper body forward until your shoulders clear the floor. Hold this position for 3 seconds. Don't hold your breath. It helps to breathe out as you lift your shoulders up. Relax back to the floor. Repeat 10 times. Build to 3 sets of 10. To challenge yourself, clasp your hands behind your head and keep your elbows out to the side.   Gluteal stretch: Lie on your back with both knees bent. Rest the ankle of one leg over the knee of your other leg. Grasp the thigh of the bottom leg and pull toward your chest. You will feel a stretch along the buttocks and possibly along the outside of your hip. Hold the stretch for 15 to 30 seconds. Repeat 3 times with each leg.   Extension exercise:   0. Lie face down on the floor for 5 minutes. If this hurts too much, lie face down with a pillow under your stomach. This should relieve your leg or back pain. When you can lie on your stomach for 5 minutes without a pillow, you can continue with Part B of this exercise.   0. After lying on your stomach for 5 minutes, prop yourself up on your elbows for another 5 minutes. If you can do this without having more leg or buttock pain, you can start doing part C of this exercise.   0. Lie on your stomach with your hands under your shoulders. Then press down on your hands and extend your elbows while keeping your hips flat on the floor. Hold for 1 second and lower yourself to the floor. Do 3 to 5 sets of 10 repetitions. Rest for 1 minute between sets. You should have no pain in your legs when you do this, but it is normal to feel some pain in your lower back.   Do this exercise several times a day.   Side plank: Lie on your side with your legs, hips, and shoulders in a straight line. Prop yourself up onto your forearm so your elbow is directly under your shoulder. Lift your hips off the floor and balance on your forearm and the  outside of your foot. Try to hold this position for 15 seconds, then slowly lower your hip to the ground. Switch sides and repeat. Work up to holding for 1 minute or longer. This exercise can be made easier by starting with your knees and hips flexed toward your chest.   Published by [employer].  This content is reviewed periodically and is subject to change as new health information becomes available. The information is intended to inform and educate and is not a replacement for medical evaluation, advice, diagnosis or treatment by a healthcare professional.   Written by Leah Cosme, MS, PT, and Meredith Min PT, [facility], [facility], for [employer]   ? 2010 [facility] and/or its affiliates. All Rights Reserved.         Copyright   [employer] 2011

## 2018-11-27 NOTE — PROGRESS NOTES
"  SUBJECTIVE:   Barrington Cárdenas is a 38 year old male who presents to clinic today for the following health issues:    Genitourinary symptoms      Duration: late 10/2018 started with back pain and urinary discomfort, back pain in left side,     Description:  Intermittent     Intensity:  6/10    Accompanying signs and symptoms (fever/discharge/nausea/vomiting/back or abdominal pain):  None    History (frequent UTI's/kidney stones/prostate problems): back problems 2016, seen 11/23/2018, prescribed flexeril but unable to take due to pt not able to work  Sexually active: YES    Precipitating or alleviating factors: None    Therapies tried and outcome: stretching    Outcome: not helping     low back pain; gets tight on left lower side.  Right above gluteus/hamstring.      Went on trip to [place], and got dehydrated.  Thinks worsened.      Has been having urinary discomfort, shortly thereafter, but not when urinating.  Feels \"like grit\" is in urethra, but not constant; can go a week without any symptoms, but then can get worse; Has had prostatitits, but does not feel like that.      No new meds; takes aleve (naproxen) twice daily for last 2 weeks.      Problem list and histories reviewed & adjusted, as indicated.  Additional history: as documented    Patient Active Problem List   Diagnosis     Tick bite     Past Surgical History:   Procedure Laterality Date     SURGICAL HISTORY OF -       Tonsils, age 3     TONSILLECTOMY         Social History   Substance Use Topics     Smoking status: Never Smoker     Smokeless tobacco: Former User     Quit date: 8/10/2005     Alcohol use Yes      Comment: socially     Family History   Problem Relation Age of Onset     Family history unknown: Yes         Current Outpatient Prescriptions   Medication Sig Dispense Refill     calcium carbonate (OS-MISA 500 MG [place]. [place]) 500 MG tablet Take 500 mg by mouth 2 times daily Reported on 4/7/2017       Cyanocobalamin (VITAMIN B 12 PO) Reported on " 4/7/2017       OMEPRAZOLE PO Take 20 mg by mouth daily        predniSONE (DELTASONE) 20 MG tablet Take 2 tablets (40 mg) by mouth daily for 5 days 10 tablet 0     cyclobenzaprine (FLEXERIL) 10 MG tablet Take 0.5-1 tablets (5-10 mg) by mouth 3 times daily as needed for muscle spasms (Patient not taking: Reported on 11/27/2018) 30 tablet 1     No Known Allergies  BP Readings from Last 3 Encounters:   11/27/18 128/86   11/23/18 124/80   10/11/18 128/80    Wt Readings from Last 3 Encounters:   11/27/18 247 lb 0.6 oz (112.1 kg)   12/03/17 248 lb (112.5 kg)   09/30/17 248 lb 11.2 oz (112.8 kg)                  Labs reviewed in EPIC    Reviewed and updated as needed this visit by clinical staff       Reviewed and updated as needed this visit by Provider         ROS:  CONSTITUTIONAL: NEGATIVE for fever, chills, change in weight  ENT/MOUTH: NEGATIVE for ear, mouth and throat problems  RESP: NEGATIVE for significant cough or SOB  CV: NEGATIVE for chest pain, palpitations or peripheral edema    OBJECTIVE:                                                    /86 (BP Location: Right arm, Patient Position: Sitting, Cuff Size: Adult Regular)  Pulse 100  Temp 96.6  F (35.9  C) (Tympanic)  Wt 247 lb 0.6 oz (112.1 kg)  SpO2 98%  BMI 33.04 kg/m2  Body mass index is 33.04 kg/(m^2).   GENERAL: healthy, alert, well nourished, well hydrated, no distress  HENT: ear canals- normal; TMs- normal; Nose- normal; Mouth- no ulcers, no lesions  NECK: no tenderness, no adenopathy, no asymmetry, no masses, no stiffness; thyroid- normal to palpation  RESP: lungs clear to auscultation - no rales, no rhonchi, no wheezes  CV: regular rates and rhythm, normal S1 S2, no S3 or S4 and no murmur, no click or rub -  ABDOMEN: soft, no tenderness, no  hepatosplenomegaly, no masses, normal bowel sounds  : within normal limits.  MS:  Left lumbar area with some pain producing radiation down left leg, posteriorly.   Diagnostic test results:  Diagnostic  Test Results: urinalysis within normal limits.      ASSESSMENT/PLAN:                                                    1. Dysuria  No signs of true urinary tract infection: likely due to his radicular symptoms;  Await his gonorrhea and chlamydia .  - UA reflex to Microscopic and Culture  - Urine Culture Aerobic Bacterial  - NEISSERIA GONORRHOEA PCR  - CHLAMYDIA TRACHOMATIS PCR  - Urine Microscopic    2. Lumbar radiculopathy  Begin prednisone followed by high dose Aleve (naproxen);  Begin home exericses; patient refuses physical therapy; if not improving, follow up for possible imaging in 6 weeks.   - predniSONE (DELTASONE) 20 MG tablet; Take 2 tablets (40 mg) by mouth daily for 5 days  Dispense: 10 tablet; Refill: 0      See Patient Instructions    Marv Thao MD  [facility]

## 2018-11-28 LAB
BACTERIA SPEC CULT: NO GROWTH
SPECIMEN SOURCE: NORMAL

## 2018-11-29 LAB
C TRACH DNA SPEC QL NAA+PROBE: NEGATIVE
N GONORRHOEA DNA SPEC QL NAA+PROBE: NEGATIVE
SPECIMEN SOURCE: NORMAL
SPECIMEN SOURCE: NORMAL

## 2019-01-07 ENCOUNTER — OFFICE VISIT (OUTPATIENT)
Dept: PEDIATRICS | Facility: CLINIC | Age: 39
End: 2019-01-07
Payer: COMMERCIAL

## 2019-01-07 VITALS
TEMPERATURE: 97.9 F | HEIGHT: 73 IN | OXYGEN SATURATION: 94 % | DIASTOLIC BLOOD PRESSURE: 86 MMHG | HEART RATE: 85 BPM | WEIGHT: 246 LBS | SYSTOLIC BLOOD PRESSURE: 134 MMHG | RESPIRATION RATE: 16 BRPM | BODY MASS INDEX: 32.6 KG/M2

## 2019-01-07 DIAGNOSIS — Z00.00 ROUTINE GENERAL MEDICAL EXAMINATION AT A HEALTH CARE FACILITY: Primary | ICD-10-CM

## 2019-01-07 DIAGNOSIS — Z11.4 SCREENING FOR HIV (HUMAN IMMUNODEFICIENCY VIRUS): ICD-10-CM

## 2019-01-07 DIAGNOSIS — D22.9 COMPOUND NEVUS: ICD-10-CM

## 2019-01-07 DIAGNOSIS — E66.9 OBESITY, UNSPECIFIED CLASSIFICATION, UNSPECIFIED OBESITY TYPE, UNSPECIFIED WHETHER SERIOUS COMORBIDITY PRESENT: ICD-10-CM

## 2019-01-07 LAB
ALBUMIN SERPL-MCNC: 4.1 G/DL (ref 3.4–5)
ALP SERPL-CCNC: 46 U/L (ref 40–150)
ALT SERPL W P-5'-P-CCNC: 46 U/L (ref 0–70)
ANION GAP SERPL CALCULATED.3IONS-SCNC: 9 MMOL/L (ref 3–14)
AST SERPL W P-5'-P-CCNC: 17 U/L (ref 0–45)
BILIRUB SERPL-MCNC: 1.2 MG/DL (ref 0.2–1.3)
BUN SERPL-MCNC: 12 MG/DL (ref 7–30)
CALCIUM SERPL-MCNC: 8.7 MG/DL (ref 8.5–10.1)
CHLORIDE SERPL-SCNC: 104 MMOL/L (ref 94–109)
CHOLEST SERPL-MCNC: 171 MG/DL
CO2 SERPL-SCNC: 24 MMOL/L (ref 20–32)
CREAT SERPL-MCNC: 0.97 MG/DL (ref 0.66–1.25)
GFR SERPL CREATININE-BSD FRML MDRD: >90 ML/MIN/{1.73_M2}
GLUCOSE SERPL-MCNC: 101 MG/DL (ref 70–99)
HDLC SERPL-MCNC: 43 MG/DL
LDLC SERPL CALC-MCNC: 113 MG/DL
NONHDLC SERPL-MCNC: 128 MG/DL
POTASSIUM SERPL-SCNC: 3.6 MMOL/L (ref 3.4–5.3)
PROT SERPL-MCNC: 7.6 G/DL (ref 6.8–8.8)
SODIUM SERPL-SCNC: 137 MMOL/L (ref 133–144)
TRIGL SERPL-MCNC: 74 MG/DL

## 2019-01-07 PROCEDURE — 80061 LIPID PANEL: CPT | Performed by: INTERNAL MEDICINE

## 2019-01-07 PROCEDURE — 80053 COMPREHEN METABOLIC PANEL: CPT | Performed by: INTERNAL MEDICINE

## 2019-01-07 PROCEDURE — 99395 PREV VISIT EST AGE 18-39: CPT | Performed by: INTERNAL MEDICINE

## 2019-01-07 PROCEDURE — 87389 HIV-1 AG W/HIV-1&-2 AB AG IA: CPT | Performed by: INTERNAL MEDICINE

## 2019-01-07 PROCEDURE — 36415 COLL VENOUS BLD VENIPUNCTURE: CPT | Performed by: INTERNAL MEDICINE

## 2019-01-07 RX ORDER — IBUPROFEN 200 MG
1 CAPSULE ORAL 2 TIMES DAILY
COMMUNITY
End: 2020-04-15

## 2019-01-07 ASSESSMENT — ENCOUNTER SYMPTOMS
SORE THROAT: 0
JOINT SWELLING: 0
SHORTNESS OF BREATH: 0
CHILLS: 0
HEARTBURN: 0
FEVER: 0
FREQUENCY: 0
WEAKNESS: 0
NERVOUS/ANXIOUS: 0
HEADACHES: 0
DIZZINESS: 0
HEMATURIA: 0
HEMATOCHEZIA: 0
MYALGIAS: 0
PARESTHESIAS: 0
COUGH: 0
ABDOMINAL PAIN: 0
DYSURIA: 0
CONSTIPATION: 0
ARTHRALGIAS: 0
DIARRHEA: 0
PALPITATIONS: 0
NAUSEA: 0
EYE PAIN: 0

## 2019-01-07 ASSESSMENT — MIFFLIN-ST. JEOR: SCORE: 2084.73

## 2019-01-07 NOTE — PROGRESS NOTES
SUBJECTIVE:   CC: Barrington Cárdenas is an 38 year old male who presents for preventative health visit.    Patient is fasting: yes    Physical   Annual:     Getting at least 3 servings of Calcium per day:  Yes    Bi-annual eye exam:  NO    Dental care twice a year:  NO    Sleep apnea or symptoms of sleep apnea:  None    Diet:  Regular (no restrictions)    Frequency of exercise:  1 day/week    Duration of exercise:  15-30 minutes    Taking medications regularly:  Yes    Medication side effects:  None    Additional concerns today:  No    PHQ-2 Total Score: 0      Today's PHQ-2 Score:   PHQ-2 ( 1999 Pfizer) 1/7/2019   Q1: Little interest or pleasure in doing things 0   Q2: Feeling down, depressed or hopeless 0   PHQ-2 Score 0   Q1: Little interest or pleasure in doing things Not at all   Q2: Feeling down, depressed or hopeless Not at all   PHQ-2 Score 0     Abuse: Current or Past(Physical, Sexual or Emotional)- No  Do you feel safe in your environment? Yes    Social History     Tobacco Use     Smoking status: Never Smoker     Smokeless tobacco: Former User   Substance Use Topics     Alcohol use: Yes     Comment: socially     Alcohol Use 1/7/2019   If you drink alcohol do you typically have greater than 3 drinks per day OR greater than 7 drinks per week? No   No flowsheet data found.     Last PSA:   PSA   Date Value Ref Range Status   04/07/2017 1.01 0 - 4 ug/L Final     Comment:     Assay Method:  Chemiluminescence using Siemens Vista analyzer       Reviewed orders with patient. Reviewed health maintenance and updated orders accordingly - Yes  Labs reviewed in EPIC  BP Readings from Last 3 Encounters:   01/07/19 134/86   11/27/18 128/86   11/23/18 124/80    Wt Readings from Last 3 Encounters:   01/07/19 111.6 kg (246 lb)   11/27/18 112.1 kg (247 lb 0.6 oz)   12/03/17 112.5 kg (248 lb)                  Patient Active Problem List   Diagnosis     Tick bite     Past Surgical History:   Procedure Laterality Date      SURGICAL HISTORY OF -       Tonsils, age 3     TONSILLECTOMY         Social History     Tobacco Use     Smoking status: Never Smoker     Smokeless tobacco: Former User   Substance Use Topics     Alcohol use: Yes     Comment: socially     Family History   Family history unknown: Yes         Current Outpatient Medications   Medication Sig Dispense Refill     calcium citrate (CITRACAL) 950 MG tablet Take 1 tablet by mouth 2 times daily       Cyanocobalamin (VITAMIN B 12 PO) Reported on 4/7/2017       OMEPRAZOLE PO Take 20 mg by mouth daily        No Known Allergies    Reviewed and updated as needed this visit by clinical staff  Tobacco  Allergies  Meds  Med Hx  Surg Hx  Fam Hx  Soc Hx        Reviewed and updated as needed this visit by Provider          Past Medical History:   Diagnosis Date     Noyola's esophagus Janurary 2015      Past Surgical History:   Procedure Laterality Date     SURGICAL HISTORY OF -       Tonsils, age 3     TONSILLECTOMY         Review of Systems   Constitutional: Negative for chills and fever.   HENT: Positive for congestion. Negative for ear pain, hearing loss and sore throat.    Eyes: Negative for pain and visual disturbance.   Respiratory: Negative for cough and shortness of breath.    Cardiovascular: Negative for chest pain, palpitations and peripheral edema.   Gastrointestinal: Negative for abdominal pain, constipation, diarrhea, heartburn, hematochezia and nausea.   Genitourinary: Negative for discharge, dysuria, frequency, genital sores, hematuria, impotence and urgency.   Musculoskeletal: Negative for arthralgias, joint swelling and myalgias.   Skin: Negative for rash.   Neurological: Negative for dizziness, weakness, headaches and paresthesias.   Psychiatric/Behavioral: Negative for mood changes. The patient is not nervous/anxious.        OBJECTIVE:   /86 (BP Location: Right arm, Patient Position: Chair, Cuff Size: Adult Regular)   Pulse 85   Temp 97.9  F (36.6  C)  "(Oral)   Resp 16   Ht 1.854 m (6' 1\")   Wt 111.6 kg (246 lb)   SpO2 94%   BMI 32.46 kg/m      Physical Exam  GENERAL: healthy, alert and no distress  EYES: Eyes grossly normal to inspection, PERRL and conjunctivae and sclerae normal  HENT: ear canals and TM's normal, nose and mouth without ulcers or lesions  NECK: no adenopathy, no asymmetry, masses, or scars and thyroid normal to palpation  RESP: lungs clear to auscultation - no rales, rhonchi or wheezes  CV: regular rate and rhythm, normal S1 S2, no S3 or S4, no murmur, click or rub, no peripheral edema and peripheral pulses strong  ABDOMEN: soft, nontender, no hepatosplenomegaly, no masses and bowel sounds normal   (male): normal male genitalia without lesions or urethral discharge, no hernia  MS: no gross musculoskeletal defects noted, no edema  SKIN: no suspicious lesions or rashes  NEURO: Normal strength and tone, mentation intact and speech normal  PSYCH: mentation appears normal, affect normal/bright    Diagnostic Test Results:  none     ASSESSMENT/PLAN:   1. Routine general medical examination at a health care facility  Discussed diet, exercise, testicular self exam, blood pressure, cholesterol, and need for cancer surveillance at appropriate ages.   - Lipid panel reflex to direct LDL Fasting  - Comprehensive metabolic panel    2. Screening for HIV (human immunodeficiency virus)    - HIV Screening    COUNSELING:   Reviewed preventive health counseling, as reflected in patient instructions       Regular exercise       Healthy diet/nutrition       Vision screening       Hearing screening       Family planning       Safe sex practices/STD prevention       Colon cancer screening       Prostate cancer screening    BP Readings from Last 1 Encounters:   01/07/19 134/86     Estimated body mass index is 32.46 kg/m  as calculated from the following:    Height as of this encounter: 1.854 m (6' 1\").    Weight as of this encounter: 111.6 kg (246 lb).           " reports that  has never smoked. He quit smokeless tobacco use about 13 years ago.      Counseling Resources:  ATP IV Guidelines  Pooled Cohorts Equation Calculator  FRAX Risk Assessment  ICSI Preventive Guidelines  Dietary Guidelines for Americans, 2010  USDA's MyPlate  ASA Prophylaxis  Lung CA Screening    Marv Thao MD  [facility]

## 2019-01-07 NOTE — PATIENT INSTRUCTIONS
"  Lab work downstairs today.  Directions:  As you walk through the first floor, you'll see (on the right) first the pharmacy, then some bathrooms, then the \"Lab and Imaging\" area. Give them your name at the window there and wait for them to call you.     Goal weight: 225 in 6 months.    Cardio exercise is probably the most helpful thing for your heart and future health.  Try to get about 30 minutes of sustained cardio exercise (biking, running, swimming, fast walking) every other day or even every day.  Keeping your heart rate at a \"target\" of (220 - your age) x 0.80 will be your goal.  For example, if you're 60 years old, this would be (220 - 60) x 0.80 = 200 beats per minute for those 30 minutes of exercise.      Preventive Health Recommendations  Male Ages 26 - 39    Yearly exam:             See your health care provider every year in order to  o   Review health changes.   o   Discuss preventive care.    o   Review your medicines if your doctor has prescribed any.    You should be tested each year for STDs (sexually transmitted diseases), if you re at risk.     After age 35, talk to your provider about cholesterol testing. If you are at risk for heart disease, have your cholesterol tested at least every 5 years.     If you are at risk for diabetes, you should have a diabetes test (fasting glucose).  Shots: Get a flu shot each year. Get a tetanus shot every 10 years.     Nutrition:    Eat at least 5 servings of fruits and vegetables daily.     Eat whole-grain bread, whole-wheat pasta and brown rice instead of white grains and rice.     Get adequate Calcium and Vitamin D.     Lifestyle    Exercise for at least 150 minutes a week (30 minutes a day, 5 days a week). This will help you control your weight and prevent disease.     Limit alcohol to one drink per day.     No smoking.     Wear sunscreen to prevent skin cancer.     See your dentist every six months for an exam and cleaning.     "

## 2019-01-08 LAB — HIV 1+2 AB+HIV1 P24 AG SERPL QL IA: NONREACTIVE

## 2019-01-23 ENCOUNTER — OFFICE VISIT (OUTPATIENT)
Dept: FAMILY MEDICINE | Facility: CLINIC | Age: 39
End: 2019-01-23
Payer: COMMERCIAL

## 2019-01-23 VITALS — DIASTOLIC BLOOD PRESSURE: 81 MMHG | SYSTOLIC BLOOD PRESSURE: 134 MMHG | HEART RATE: 89 BPM

## 2019-01-23 DIAGNOSIS — D48.5 NEOPLASM OF UNCERTAIN BEHAVIOR OF SKIN: Primary | ICD-10-CM

## 2019-01-23 DIAGNOSIS — L91.8 INFLAMED SKIN TAG: ICD-10-CM

## 2019-01-23 DIAGNOSIS — D22.5 COMPOUND NEVUS OF BACK: ICD-10-CM

## 2019-01-23 PROCEDURE — 99213 OFFICE O/P EST LOW 20 MIN: CPT | Mod: 25 | Performed by: FAMILY MEDICINE

## 2019-01-23 PROCEDURE — 11200 RMVL SKIN TAGS UP TO&INC 15: CPT | Performed by: FAMILY MEDICINE

## 2019-01-23 PROCEDURE — 11301 SHAVE SKIN LESION 0.6-1.0 CM: CPT | Mod: 59 | Performed by: FAMILY MEDICINE

## 2019-01-23 PROCEDURE — 88305 TISSUE EXAM BY PATHOLOGIST: CPT | Mod: TC | Performed by: FAMILY MEDICINE

## 2019-01-23 NOTE — LETTER
1/23/2019         RE: Barrington Cárdenas  [address] [facility] [address]        Dear Colleague,    Thank you for referring your patient, [place]denas, [place] [facility] Please see a copy of my visit note below.    [facility] - PRIMARY CARE SKIN    CC: Lesion(s)  SUBJECTIVE:   Barrington Cárdenas is a(n) 38 year old male who presents to clinic today because of a lesion on the back.    Issue One: Lesion on back. It has been irritated frequently.    Issue Two: Skin tag in left armpit    Family Medical History  Unknown - adopted    Occupation: works for a chemical distributor (indoor).    Refer to electronic medical record (EMR) for past medical history and medications.    INTEGUMENTARY/SKIN: POSITIVE for changing lesion  ROS: 14 point review of systems was negative except the symptoms listed above in the HPI.    This document serves as a record of the services and decisions personally performed and made by Sarah Vicente MD and was created by David Streeter, a trained medical scribe, based on personal observations and provider statements to the medical scribe.  January 23, 2019 3:45 PM   David Streeter    OBJECTIVE:   GENERAL: healthy, alert and no distress.  SKIN: Young Skin Type - I.  Trunk and Arms examined. The dermatoscope was used to help evaluate pigmented lesions.  Skin Pertinent Findings:  Left upper axilla: 5 mm in size pedunculated, benign-appearing skin tag(s)/acrochorda.  Name : Lesion Removal  Indication : Irritated/inflamed benign skin tag.  Location(s) : left axilla - x1.  Completed by : Sudha Vicente MD  Note : Prior to treatment, discussed the risk of pain, blistering, infection, scarring, hypopigmentation, hyperpigmentation, and recurrence or need for retreatment. Benefits of treatment and alternative treatments were also discussed.     The lesion(s) were removed or treated with clipping after alcohol prep    Aluminum chloride and Monsel's were used for  "hemostasis.    Patient tolerated the procedure well and left in good condition.  Tissue sample sent in : YES.  Total number of lesions treated : 1.    Back, midline, T12: 10 mm in size raised brown lesion. ? Irritated compound nevus ? Other    Back: Scattered brown, macule(s) most consistent with benign solar lentigo      ASSESSMENT:     Encounter Diagnoses   Name Primary?     Neoplasm of uncertain behavior of skin Yes     Inflamed skin tag      Compound nevus of back          PLAN:   Patient Instructions   FUTURE APPOINTMENTS  Follow up as needed and per pathology report.    WOUND CARE INSTRUCTIONS  1. Wash hands before every dressing change.  2. After 24 hours, change dressing daily.  3. Wash the wound area with a mild soap, then rinse.  4. Gently pat dry with a sterile gauze or Q-tip.  5. Using a Q-tip, apply Vaseline or Aquaphor only over entire wound. Do NOT use Neosporin - as many people react to neomycin.  6. Finally, cover with a bandage or sterile non-stick gauze with micropore paper tape.  7. Repeat once daily until wound has healed.      Soap, water and shampoo will not hurt this area.    Do not go swimming or take baths, but showering is encouraged.    Limit use of the area where the procedure was done for a few days to allow for optimal healing.    If you experience bleeding:  Wash hands and hold firm pressure on the area for 10 minutes without checking to see if the bleeding has stopped. \"Checking\" pulls off the protective wound clot and restarts the bleeding all over again. Re-apply pressure for 10 minutes if necessary to stop bleeding.  Use additional sterile gauze and tape to maintain pressure once bleeding has stopped.  If bleeding continues, then call back to clinic at [phone].    Signs of Infection:  Infection can occur in any area where skin has been disrupted.  If you notice persistent redness, swelling, colored drainage, increasing pain, fever or other signs of infection, please call us " at: [phone] and ask to have me or my colleague paged. We will call you back to discuss.    Pathology Results:  You will be notified, generally via letter or MyChart, in approximately 10 days. If there is anything we need to discuss or further treatment needed, I will call you to discuss it.    PATIENT INFORMATION : WOUNDS  During the healing process you will notice a number of changes. All wounds develop a small halo of redness surrounding the wound.  This means healing is occurring. Severe itching with extensive redness usually indicates sensitivity to the ointment or bandage tape used to dress the wound.  You should call our office if this develops.      Swelling  and/or discoloration around your surgical site is common, particularly when performed around the eye.    All wounds normally drain.  The larger the wound the more drainage there will be.  After 7-10 days, you will notice the wound beginning to shrink and new skin will begin to grow.  The wound is healed when you can see skin has formed over the entire area.  A healed wound has a healthy, shiny look to the surface and is red to dark pink in color to normalize.  Wounds may take approximately 4-6 weeks to heal.  Larger wounds may take 6-8 weeks. After the wound is healed you may discontinue dressing changes.    You may experience a sensation of tightness as your wound heals. This is normal and will gradually subside.    Your healed wound may be sensitive to temperature changes. This sensitivity improves with time, but if you re having a lot of discomfort, try to avoid temperature extremes.    Patients frequently experience itching after their wound appears to have healed because of the continue healing under the skin.  Plain Vaseline will help relieve the itching.        TT: 20 minutes.  CT: 15 minutes.    The information in this document, created by the medical scribe for me, accurately reflects the services I personally performed and the decisions  made by me. I have reviewed and approved this document for accuracy prior to leaving the patient care area.  January 23, 2019 3:45 PM  Sarah Vicente MD  [facility] [place]    Again, thank you for allowing me to participate in the care of your patient.        Sincerely,        Sarah Vicente MD

## 2019-01-23 NOTE — PROGRESS NOTES
[facility] - PRIMARY CARE SKIN    CC: Lesion(s)  SUBJECTIVE:   Barrington Cárdenas is a(n) 38 year old male who presents to clinic today because of a lesion on the back.    Issue One: Lesion on back. It has been irritated frequently.    Issue Two: Skin tag in left armpit    Family Medical History  Unknown - adopted    Occupation: works for a chemical distributor (indoor).    Refer to electronic medical record (EMR) for past medical history and medications.    INTEGUMENTARY/SKIN: POSITIVE for changing lesion  ROS: 14 point review of systems was negative except the symptoms listed above in the HPI.    This document serves as a record of the services and decisions personally performed and made by Sarah Vicente MD and was created by David Streeter, a trained medical scribe, based on personal observations and provider statements to the medical scribe.  January 23, 2019 3:45 PM   David Streeter    OBJECTIVE:   GENERAL: healthy, alert and no distress.  SKIN: Young Skin Type - I.  Trunk and Arms examined. The dermatoscope was used to help evaluate pigmented lesions.  Skin Pertinent Findings:  Left upper axilla: 5 mm in size pedunculated, benign-appearing skin tag(s)/acrochorda.  Name : Lesion Removal  Indication : Irritated/inflamed benign skin tag.  Location(s) : left axilla - x1.  Completed by : Sudha Vicente MD  Note : Prior to treatment, discussed the risk of pain, blistering, infection, scarring, hypopigmentation, hyperpigmentation, and recurrence or need for retreatment. Benefits of treatment and alternative treatments were also discussed.     The lesion(s) were removed or treated with clipping after alcohol prep    Aluminum chloride and Monsel's were used for hemostasis.    Patient tolerated the procedure well and left in good condition.  Tissue sample sent in : YES.  Total number of lesions treated : 1.    Back, midline, T12: 10 mm in size raised brown lesion. ? Irritated compound nevus ? Other    Back:  "Scattered brown, macule(s) most consistent with benign solar lentigo      ASSESSMENT:     Encounter Diagnoses   Name Primary?     Neoplasm of uncertain behavior of skin Yes     Inflamed skin tag      Compound nevus of back          PLAN:   Patient Instructions   FUTURE APPOINTMENTS  Follow up as needed and per pathology report.    WOUND CARE INSTRUCTIONS  1. Wash hands before every dressing change.  2. After 24 hours, change dressing daily.  3. Wash the wound area with a mild soap, then rinse.  4. Gently pat dry with a sterile gauze or Q-tip.  5. Using a Q-tip, apply Vaseline or Aquaphor only over entire wound. Do NOT use Neosporin - as many people react to neomycin.  6. Finally, cover with a bandage or sterile non-stick gauze with micropore paper tape.  7. Repeat once daily until wound has healed.      Soap, water and shampoo will not hurt this area.    Do not go swimming or take baths, but showering is encouraged.    Limit use of the area where the procedure was done for a few days to allow for optimal healing.    If you experience bleeding:  Wash hands and hold firm pressure on the area for 10 minutes without checking to see if the bleeding has stopped. \"Checking\" pulls off the protective wound clot and restarts the bleeding all over again. Re-apply pressure for 10 minutes if necessary to stop bleeding.  Use additional sterile gauze and tape to maintain pressure once bleeding has stopped.  If bleeding continues, then call back to clinic at [phone].    Signs of Infection:  Infection can occur in any area where skin has been disrupted.  If you notice persistent redness, swelling, colored drainage, increasing pain, fever or other signs of infection, please call us at: [phone] and ask to have me or my colleague paged. We will call you back to discuss.    Pathology Results:  You will be notified, generally via letter or MyChart, in approximately 10 days. If there is anything we need to discuss or further " treatment needed, I will call you to discuss it.    PATIENT INFORMATION : WOUNDS  During the healing process you will notice a number of changes. All wounds develop a small halo of redness surrounding the wound.  This means healing is occurring. Severe itching with extensive redness usually indicates sensitivity to the ointment or bandage tape used to dress the wound.  You should call our office if this develops.      Swelling  and/or discoloration around your surgical site is common, particularly when performed around the eye.    All wounds normally drain.  The larger the wound the more drainage there will be.  After 7-10 days, you will notice the wound beginning to shrink and new skin will begin to grow.  The wound is healed when you can see skin has formed over the entire area.  A healed wound has a healthy, shiny look to the surface and is red to dark pink in color to normalize.  Wounds may take approximately 4-6 weeks to heal.  Larger wounds may take 6-8 weeks. After the wound is healed you may discontinue dressing changes.    You may experience a sensation of tightness as your wound heals. This is normal and will gradually subside.    Your healed wound may be sensitive to temperature changes. This sensitivity improves with time, but if you re having a lot of discomfort, try to avoid temperature extremes.    Patients frequently experience itching after their wound appears to have healed because of the continue healing under the skin.  Plain Vaseline will help relieve the itching.        TT: 20 minutes.  CT: 15 minutes.    The information in this document, created by the medical scribe for me, accurately reflects the services I personally performed and the decisions made by me. I have reviewed and approved this document for accuracy prior to leaving the patient care area.  January 23, 2019 3:45 PM  Sarah Vicente MD  [facility]

## 2019-01-23 NOTE — PROCEDURES
Name : Shave Excision  Indication : Excision of tissue for pathology evaluation.  Location(s) : Back, midline, T12: 10 mm in size raised brown lesion. ? Irritated compound nevus ? Other  Completed by : Sudha Vicente MD  Photo Taken : no.  Anesthesia : Patient was anesthetized by infiltrating the area surrounding the lesion with 1% lidocaine.   epinephrine [id number] : Yes.  Note : Discussed the risk of pain, infection, scarring, hypo- or hyperpigmentation and recurrence or need for re-treatment. The benefits of treatment and alternative treatments were also discussed.    During this procedure, the universal protocol was utilized. The patient's identity was confirmed by no less than two patient identifiers, correct procedure was verified, correct site was verified and marked as applicable and a final pause was completed.    Sterile technique was used throughout the procedure. The skin was cleaned and prepped with surgical cleanser. Once adequate anesthesia was obtained, the lesion was removed with a deep scallop shave procedure. The specimen was sent to pathology.    Direct pressure and aluminum chloride and monopolar cautery was applied for hemostasis. No bleeding was present upon the completion of the procedure. The wound was coated with antibacterial ointment. A dry sterile dressing was applied. Patient tolerated the procedure well and left in satisfactory condition.    Primary provider and referring provider will be informed regarding the tissue report when it returns.

## 2019-01-23 NOTE — PATIENT INSTRUCTIONS
"FUTURE APPOINTMENTS  Follow up as needed and per pathology report.    WOUND CARE INSTRUCTIONS  1. Wash hands before every dressing change.  2. After 24 hours, change dressing daily.  3. Wash the wound area with a mild soap, then rinse.  4. Gently pat dry with a sterile gauze or Q-tip.  5. Using a Q-tip, apply Vaseline or Aquaphor only over entire wound. Do NOT use Neosporin - as many people react to neomycin.  6. Finally, cover with a bandage or sterile non-stick gauze with micropore paper tape.  7. Repeat once daily until wound has healed.      Soap, water and shampoo will not hurt this area.    Do not go swimming or take baths, but showering is encouraged.    Limit use of the area where the procedure was done for a few days to allow for optimal healing.    If you experience bleeding:  Wash hands and hold firm pressure on the area for 10 minutes without checking to see if the bleeding has stopped. \"Checking\" pulls off the protective wound clot and restarts the bleeding all over again. Re-apply pressure for 10 minutes if necessary to stop bleeding.  Use additional sterile gauze and tape to maintain pressure once bleeding has stopped.  If bleeding continues, then call back to clinic at [phone].    Signs of Infection:  Infection can occur in any area where skin has been disrupted.  If you notice persistent redness, swelling, colored drainage, increasing pain, fever or other signs of infection, please call us at: [phone] and ask to have me or my colleague paged. We will call you back to discuss.    Pathology Results:  You will be notified, generally via letter or MyChart, in approximately 10 days. If there is anything we need to discuss or further treatment needed, I will call you to discuss it.    PATIENT INFORMATION : WOUNDS  During the healing process you will notice a number of changes. All wounds develop a small halo of redness surrounding the wound.  This means healing is occurring. Severe itching " with extensive redness usually indicates sensitivity to the ointment or bandage tape used to dress the wound.  You should call our office if this develops.      Swelling  and/or discoloration around your surgical site is common, particularly when performed around the eye.    All wounds normally drain.  The larger the wound the more drainage there will be.  After 7-10 days, you will notice the wound beginning to shrink and new skin will begin to grow.  The wound is healed when you can see skin has formed over the entire area.  A healed wound has a healthy, shiny look to the surface and is red to dark pink in color to normalize.  Wounds may take approximately 4-6 weeks to heal.  Larger wounds may take 6-8 weeks. After the wound is healed you may discontinue dressing changes.    You may experience a sensation of tightness as your wound heals. This is normal and will gradually subside.    Your healed wound may be sensitive to temperature changes. This sensitivity improves with time, but if you re having a lot of discomfort, try to avoid temperature extremes.    Patients frequently experience itching after their wound appears to have healed because of the continue healing under the skin.  Plain Vaseline will help relieve the itching.

## 2019-01-31 ENCOUNTER — TELEPHONE (OUTPATIENT)
Dept: FAMILY MEDICINE | Facility: CLINIC | Age: 39
End: 2019-01-31

## 2019-01-31 LAB — COPATH REPORT: NORMAL

## 2019-01-31 NOTE — TELEPHONE ENCOUNTER
Notes recorded by Sarah Vicente MD on 1/31/2019 at 2:16 PM CST  Barrington,       Good news, both lesions were benign ( no cancer ) . The lesion on the back was a nevus and the lesion in the armpit was a skin tag.     Thank you for allowing me to be involved in your health care.  If you have any questions or concerns please feel free to contact me.  Sarah Vicente M.D.  [facility] [place]

## 2019-01-31 NOTE — TELEPHONE ENCOUNTER
Called and spoke to patient. Educated patient on biopsy results- both lesions were benign (no cancer), skin tag + nevus. Patient voiced understanding.    Aidan RN-BSN  [facility]  [phone]

## 2019-03-29 ENCOUNTER — OFFICE VISIT (OUTPATIENT)
Dept: URGENT CARE | Facility: URGENT CARE | Age: 39
End: 2019-03-29
Payer: COMMERCIAL

## 2019-03-29 VITALS
OXYGEN SATURATION: 96 % | BODY MASS INDEX: 31.78 KG/M2 | WEIGHT: 240.9 LBS | HEART RATE: 89 BPM | DIASTOLIC BLOOD PRESSURE: 88 MMHG | SYSTOLIC BLOOD PRESSURE: 134 MMHG | TEMPERATURE: 97.7 F

## 2019-03-29 DIAGNOSIS — J01.00 ACUTE MAXILLARY SINUSITIS, RECURRENCE NOT SPECIFIED: Primary | ICD-10-CM

## 2019-03-29 PROCEDURE — 99213 OFFICE O/P EST LOW 20 MIN: CPT | Performed by: FAMILY MEDICINE

## 2019-03-29 RX ORDER — AMOXICILLIN 500 MG/1
500 CAPSULE ORAL 2 TIMES DAILY
Qty: 20 CAPSULE | Refills: 0 | Status: SHIPPED | OUTPATIENT
Start: 2019-03-29 | End: 2019-04-03 | Stop reason: ALTCHOICE

## 2019-03-29 RX ORDER — FLUTICASONE PROPIONATE 50 MCG
1 SPRAY, SUSPENSION (ML) NASAL DAILY
Qty: 9.9 ML | Refills: 0 | Status: SHIPPED | OUTPATIENT
Start: 2019-03-29 | End: 2023-09-06

## 2019-03-30 NOTE — PATIENT INSTRUCTIONS
Patient Education     Acute Bacterial Rhinosinusitis (ABRS)    Acute bacterial rhinosinusitis (ABRS) is an infection of your nasal cavity and sinuses. It s caused by bacteria. Acute means that you ve had symptoms for less than 4 weeks, but possibly up to 12 weeks.  Understanding your sinuses  The nasal cavity is the large air-filled space behind your nose. The sinuses are a group of spaces formed by the bones of your face. They connect with your nasal cavity. ABRS causes the tissue lining these spaces to become inflamed. Mucus may not drain normally. This leads to facial pain and other symptoms.  What causes ABRS?  ABRS most often follows an upper respiratory infection caused by a virus. Bacteria then infect the lining of your nasal cavity and sinuses. But you can also get ABRS if you have:    Nasal allergies    Long-term nasal swelling and congestion not caused by allergies    Blockage in the nose  Symptoms of ABRS  The symptoms of ABRS may be different for each person and include:    Nasal congestion or blockage    Pain or pressure in the face    Thick, colored drainage from the nose  Other symptoms may include:    Runny nose    Fluid draining from the nose down the throat (postnasal drip)    Headache    Cough    Pain    Fever  Diagnosing ABRS  ABRS may be diagnosed if you ve had an upper respiratory infection like a cold and cough for 10 or more days without improvement or with worsening symptoms. Your healthcare provider will ask about your symptoms and your medical history. The provider will check your vital signs, including your temperature. You ll have a physical exam. The healthcare provider will check your ears, nose, and throat. You likely won t need any tests. If ABRS comes back, you may have a culture or other tests.  Treatment for ABRS  Treatment may include:    Antibiotic medicine. This is for symptoms that last for at least 10 to 14 days.    Nasal corticosteroid medicine. Drops or spray used in the  nose can lessen swelling and congestion.    Over-the-counter pain medicine. This is to lessen sinus pain and pressure.    Nasal decongestant medicine. Spray or drops may help to lessen congestion. Do not use them for more than a few days.    Salt wash (saline irrigation). This can help to loosen mucus.  Possible complications of ABRS  ABRS may come back or become long-term (chronic). In rare cases, ABRS may cause complications such as:     Inflamed tissue around the brain and spinal cord (meningitis)    Inflamed tissue around the eyes (orbital cellulitis)    Inflamed bones around the sinuses (osteitis)  These problems may need to be treated in a hospital with intravenous (IV) antibiotic medicine or surgery.  When to call the healthcare provider  Call your healthcare provider if you have any of the following:    Symptoms that don t get better, or get worse    Symptoms that don t get better after 3 to 5 days on antibiotics    Trouble seeing    Swelling around your eyes    Confusion or trouble staying awake   Date Last Reviewed: 5/1/2017 2000-2018 [employer]. [address] [place] [address], [place] [address]. All rights reserved. This information is not intended as a substitute for professional medical care. Always follow your healthcare professional's instructions.

## 2019-03-30 NOTE — PROGRESS NOTES
SUBJECTIVE:   Chief Complaint   Patient presents with     Sinus Problem     Pt. presents with the following complaint sinus pressure, green nasal mucus , congestion  Onset 4-5 weeks ago .tx- mucinex      Acute Illness   Concerns: Nasal congestion   When did it start? 5 weeks ago  Is it getting better, worse or staying the same? unchanged    Fatigue/Achiness?:No     Fever?: No     Chills/Sweats?: No     Headache (location?)No     Sinus Pressure?: YES     Eye redness/Discharge?: No     Ear Pain?: No     Runny nose?:  YES     Congestion?:  YES     Sore Throat?: No   Respiratory    Cough?:  YES-non-productive    Wheeze?: No   GI/    Decreased Appetite?: No     Nausea?:No     Vomiting?: No     Diarrhea?:  No     Dysuria/Frequency?.:No       Any Illness Exposure?: No     Any foreign travel or contact with anyone ill who travelled abroad? No     Therapies Tried and outcome: Mucinex without improvement     Review of Systems review of system negative except as mentioned in HPI.       Past Medical History:   Diagnosis Date     Noyola's esophagus Janurary 2015     Family History   Family history unknown: Yes     Current Outpatient Medications   Medication Sig Dispense Refill     amoxicillin (AMOXIL) 500 MG capsule Take 1 capsule (500 mg) by mouth 2 times daily for 10 days 20 capsule 0     calcium citrate (CITRACAL) 950 MG tablet Take 1 tablet by mouth 2 times daily       fluticasone (FLONASE) 50 MCG/ACT nasal spray Spray 1 spray into both nostrils daily 9.9 mL 0     OMEPRAZOLE PO Take 20 mg by mouth daily        Cyanocobalamin (VITAMIN B 12 PO) Reported on 4/7/2017       Social History     Tobacco Use     Smoking status: Never Smoker     Smokeless tobacco: Former User   Substance Use Topics     Alcohol use: Yes     Comment: socially       OBJECTIVE  /88 (BP Location: Right arm, Patient Position: Chair)   Pulse 89   Temp 97.7  F (36.5  C) (Oral)   Wt 109.3 kg (240 lb 14.4 oz)   SpO2 96%   BMI 31.78 kg/m       Physical Exam   Constitutional: He appears well-developed and well-nourished. No distress.   HENT:   Head: Normocephalic and atraumatic.   Right Ear: External ear normal.   Left Ear: External ear normal.   Mouth/Throat: Oropharynx is clear and moist. No oropharyngeal exudate.   Maxillary sinus and ethmoid sinus tenderness    Eyes: Conjunctivae are normal. Right eye exhibits no discharge. Left eye exhibits no discharge.   Neck: Neck supple.   Cardiovascular: Normal rate and regular rhythm.   No murmur heard.  Pulmonary/Chest: Effort normal and breath sounds normal. No respiratory distress. He has no wheezes.   Lymphadenopathy:     He has no cervical adenopathy.   Neurological: He is alert.   Skin: Skin is warm. He is not diaphoretic.   No rash on exposed skin       Labs:  No results found for this or any previous visit (from the past 24 hour(s)).    X-Ray was not done.    ASSESSMENT:    Barrington was seen today for sinus problem.    Diagnoses and all orders for this visit:    Acute maxillary sinusitis, recurrence not specified  -     amoxicillin (AMOXIL) 500 MG capsule; Take 1 capsule (500 mg) by mouth 2 times daily for 10 days  -     fluticasone (FLONASE) 50 MCG/ACT nasal spray; Spray 1 spray into both nostrils daily        Followup:    If not improving or if condition worsens, follow up with your Primary Care Provider    Pilar Stubbs MD  \

## 2019-04-03 ENCOUNTER — OFFICE VISIT (OUTPATIENT)
Dept: FAMILY MEDICINE | Facility: CLINIC | Age: 39
End: 2019-04-03
Payer: COMMERCIAL

## 2019-04-03 VITALS
HEART RATE: 80 BPM | WEIGHT: 238 LBS | SYSTOLIC BLOOD PRESSURE: 138 MMHG | TEMPERATURE: 97.8 F | OXYGEN SATURATION: 98 % | RESPIRATION RATE: 20 BRPM | BODY MASS INDEX: 31.4 KG/M2 | DIASTOLIC BLOOD PRESSURE: 82 MMHG

## 2019-04-03 DIAGNOSIS — J01.00 ACUTE MAXILLARY SINUSITIS, RECURRENCE NOT SPECIFIED: Primary | ICD-10-CM

## 2019-04-03 PROCEDURE — 99213 OFFICE O/P EST LOW 20 MIN: CPT | Performed by: NURSE PRACTITIONER

## 2019-04-03 NOTE — PROGRESS NOTES
SUBJECTIVE:   Barrington Cárdenas is a 38 year old male who presents to clinic today for the following health issues:      ED/UC Followup:    Facility:  [facility]   Date of visit: 03/29/2019  Reason for visit: sinus pressure   Current Status: No improvement     Andrey is in clinic today with c/o being ill for more than a month.   He was seen in urgent care 3/29/2019.  He has been taking his amoxicillin (x5 days) and his symptoms have not shown much improvement.  His nasal congestion is a little better.      Problem list and histories reviewed & adjusted, as indicated.  Additional history: as documented    Patient Active Problem List   Diagnosis     Obesity, unspecified classification, unspecified obesity type, unspecified whether serious comorbidity present     Past Surgical History:   Procedure Laterality Date     SURGICAL HISTORY OF -       Tonsils, age 3     TONSILLECTOMY         Social History     Tobacco Use     Smoking status: Never Smoker     Smokeless tobacco: Former User   Substance Use Topics     Alcohol use: Yes     Comment: socially     Family History   Family history unknown: Yes         Current Outpatient Medications   Medication Sig Dispense Refill     amoxicillin (AMOXIL) 500 MG capsule Take 1 capsule (500 mg) by mouth 2 times daily for 10 days 20 capsule 0     calcium citrate (CITRACAL) 950 MG tablet Take 1 tablet by mouth 2 times daily       Cyanocobalamin (VITAMIN B 12 PO) Reported on 4/7/2017       fluticasone (FLONASE) 50 MCG/ACT nasal spray Spray 1 spray into both nostrils daily 9.9 mL 0     OMEPRAZOLE PO Take 20 mg by mouth daily        No Known Allergies  Recent Labs   Lab Test 01/07/19  0725   *   HDL 43   TRIG 74   ALT 46   CR 0.97   GFRESTIMATED >90   GFRESTBLACK >90   POTASSIUM 3.6      BP Readings from Last 3 Encounters:   04/03/19 138/82   03/29/19 134/88   01/23/19 134/81    Wt Readings from Last 3 Encounters:   04/03/19 108 kg (238 lb)   03/29/19 109.3 kg (240 lb 14.4 oz)    01/07/19 111.6 kg (246 lb)                  Labs reviewed in EPIC    Reviewed and updated as needed this visit by clinical staff       Reviewed and updated as needed this visit by Provider         ROS:  Constitutional, HEENT, cardiovascular, pulmonary, GI, , musculoskeletal, neuro, skin, endocrine and psych systems are negative, except as otherwise noted.    OBJECTIVE:     /82   Pulse 80   Temp 97.8  F (36.6  C)   Resp 20   Wt 108 kg (238 lb)   SpO2 98%   BMI 31.40 kg/m    Body mass index is 31.4 kg/m .  Physical Exam   Constitutional: He appears well-developed and well-nourished. No distress.   HENT:   Head: Normocephalic and atraumatic.   Right Ear: External ear normal.   Left Ear: External ear normal.   Mouth/Throat: Oropharynx is clear and moist. No oropharyngeal exudate.   Maxillary sinus and ethmoid sinus tenderness    Neck: Neck supple.  no adenopathy.  Cardiovascular: Normal rate and regular rhythm.   No murmur heard.  Pulmonary/Chest: Effort normal and breath sounds normal. No respiratory distress. He has no wheezes. lung sounds CTA.  Neurological: He is alert.   Skin: Skin is warm. He is not diaphoretic.   No rash on exposed skin       ASSESSMENT/PLAN:     (J01.00) Acute maxillary sinusitis, recurrence not specified  (primary encounter diagnosis)  Comment: acute  Plan: amoxicillin-clavulanate (AUGMENTIN) 875-125 MG         tablet          Stop amoxicillin.  Take antibiotic/augmentin as prescribed. Symptomatic management (push fluids, good nutrition, MVI if indicated, OTC decongestants, etc).   Cool mist humidifier encouraged.  Flonase okay to continue.  Return prn any problems, questions, or concerns.          ALEJANDRO Woo CNP  [facility]

## 2019-04-03 NOTE — PATIENT INSTRUCTIONS
Patient Education     Sinusitis (Antibiotic Treatment)    The sinuses are air-filled spaces within the bones of the face. They connect to the inside of the nose. Sinusitis is an inflammation of the tissue that lines the sinuses. Sinusitis can occur during a cold. It can also happen due to allergies to pollens and other particles in the air. Sinusitis can cause symptoms of sinus congestion and a feeling of fullness. A sinus infection causes fever, headache, and facial pain. There is often green or yellow fluid draining from the nose or into the back of the throat (post-nasal drip). You have been given antibiotics to treat this condition.  Home care    Take the full course of antibiotics as instructed. Do not stop taking them, even when you feel better.    Drink plenty of water, hot tea, and other liquids. This may help thin nasal mucus. It also may help your sinuses drain fluids.    Heat may help soothe painful areas of your face. Use a towel soaked in hot water. Or,  the shower and direct the warm spray onto your face. Using a vaporizer along with a menthol rub at night may also help soothe symptoms.     An expectorant with guaifenesin may help thin nasal mucus and help your sinuses drain fluids.    You can use an over-the-counter decongestant, unless a similar medicine was prescribed to you. Nasal sprays work the fastest. Use one that contains phenylephrine or oxymetazoline. First blow your nose gently. Then use the spray. Do not use these medicines more often than directed on the label. If you do, your symptoms may get worse. You may also take pills that contain pseudoephedrine. Don t use products that combine multiple medicines. This is because side effects may be increased. Read labels. You can also ask the pharmacist for help. (People with high blood pressure should not use decongestants. They can raise blood pressure.)    Over-the-counter antihistamines may help if allergies contributed to your  sinusitis.      Do not use nasal rinses or irrigation during an acute sinus infection, unless your healthcare provider tells you to. Rinsing may spread the infection to other areas in your sinuses.    Use acetaminophen or ibuprofen to control pain, unless another pain medicine was prescribed to you. If you have chronic liver or kidney disease or ever had a stomach ulcer, talk with your healthcare provider before using these medicines. (Aspirin should never be taken by anyone under age 18 who is ill with a fever. It may cause severe liver damage.)    Don't smoke. This can make symptoms worse.  Follow-up care  Follow up with your healthcare provider or our staff if you are better in 1 week.  When to seek medical advice  Call your healthcare provider if any of these occur:    Facial pain or headache that gets worse    Stiff neck    Unusual drowsiness or confusion    Swelling of your forehead or eyelids    Vision problems, such as blurred or double vision    Fever of 100.4 F (38 C) or higher, or as directed by your healthcare provider    Seizure    Breathing problems    Symptoms don't go away in 10 days  Prevention  Here are steps you can take to help prevent an infection:    Keep good hand washing habits.    Don t have close contact with people who have sore throats, colds, or other upper respiratory infections.    Don t smoke, and stay away from secondhand smoke.    Stay up to date with of your vaccines.  Date Last Reviewed: 11/1/2017 2000-2018 [employer]. [address] [place] [address], [place] [address]. All rights reserved. This information is not intended as a substitute for professional medical care. Always follow your healthcare professional's instructions.

## 2019-11-03 ENCOUNTER — HEALTH MAINTENANCE LETTER (OUTPATIENT)
Age: 39
End: 2019-11-03

## 2020-01-11 ENCOUNTER — OFFICE VISIT (OUTPATIENT)
Dept: URGENT CARE | Facility: URGENT CARE | Age: 40
End: 2020-01-11
Payer: COMMERCIAL

## 2020-01-11 VITALS
DIASTOLIC BLOOD PRESSURE: 82 MMHG | TEMPERATURE: 97.8 F | WEIGHT: 243 LBS | OXYGEN SATURATION: 96 % | HEART RATE: 76 BPM | BODY MASS INDEX: 32.06 KG/M2 | SYSTOLIC BLOOD PRESSURE: 128 MMHG

## 2020-01-11 DIAGNOSIS — B37.0 THRUSH: Primary | ICD-10-CM

## 2020-01-11 PROCEDURE — 99213 OFFICE O/P EST LOW 20 MIN: CPT | Performed by: FAMILY MEDICINE

## 2020-01-11 RX ORDER — NYSTATIN 100000/ML
500000 SUSPENSION, ORAL (FINAL DOSE FORM) ORAL 4 TIMES DAILY
Qty: 200 ML | Refills: 0 | Status: SHIPPED | OUTPATIENT
Start: 2020-01-11 | End: 2020-01-22

## 2020-01-12 NOTE — PROGRESS NOTES
SUBJECTIVE:  Chief Complaint   Patient presents with     Mouth/Lip Problem     sore bottom lip      Barrington Cárdenas is a 39 year old male who presents with a chief complaint of bottom lip pain.    Symptoms began 4 day(s) ago, are moderate and worsening.  No injury.  Uses flonase.  Woke up one morning, mouth was opening and had salvia on beard.  Developing more whiter patches now.  No recent antibiotic.  Denies any fever, no URI symptoms.      Past Medical History:   Diagnosis Date     Noyola's esophagus Janurary 2015     Current Outpatient Medications   Medication Sig Dispense Refill     calcium citrate (CITRACAL) 950 MG tablet Take 1 tablet by mouth 2 times daily       Cyanocobalamin (VITAMIN B 12 PO) Reported on 4/7/2017       fluticasone (FLONASE) 50 MCG/ACT nasal spray Spray 1 spray into both nostrils daily 9.9 mL 0     OMEPRAZOLE PO Take 20 mg by mouth daily        Social History     Tobacco Use     Smoking status: Never Smoker     Smokeless tobacco: Former User   Substance Use Topics     Alcohol use: Yes     Comment: socially       ROS:  Review of systems negative except as stated above.    EXAM:   /82 (BP Location: Right arm, Patient Position: Sitting)   Pulse 76   Temp 97.8  F (36.6  C) (Tympanic)   Wt 110.2 kg (243 lb)   SpO2 96%   BMI 32.06 kg/m    GENERAL APPEARANCE: healthy, alert and no distress  MOUTH: lower lip mucosa with irregular white patchiness, no white patch on buccal mucoa  PSYCH: alert, affect bright      ASSESSMENT/PLAN:  (B37.0) Thrush  (primary encounter diagnosis)  Comment: lower lip  Plan: nystatin (MYCOSTATIN) [id number] UNIT/ML suspension            Reassurance given, RX nystatin given for probable thrush on lower lip, okay to rub into lip.    Follow up with primary provider if no resolution of symptoms within 2 weeks.    Heber Leblanc MD

## 2020-01-22 ENCOUNTER — OFFICE VISIT (OUTPATIENT)
Dept: PEDIATRICS | Facility: CLINIC | Age: 40
End: 2020-01-22
Payer: COMMERCIAL

## 2020-01-22 VITALS
HEART RATE: 88 BPM | TEMPERATURE: 97.3 F | OXYGEN SATURATION: 95 % | WEIGHT: 238 LBS | SYSTOLIC BLOOD PRESSURE: 102 MMHG | DIASTOLIC BLOOD PRESSURE: 60 MMHG | BODY MASS INDEX: 31.4 KG/M2

## 2020-01-22 DIAGNOSIS — B37.0 CANDIDIASIS OF MOUTH: Primary | ICD-10-CM

## 2020-01-22 PROCEDURE — 99213 OFFICE O/P EST LOW 20 MIN: CPT | Performed by: PHYSICIAN ASSISTANT

## 2020-01-22 RX ORDER — FLUCONAZOLE 100 MG/1
TABLET ORAL
Qty: 11 TABLET | Refills: 0 | Status: SHIPPED | OUTPATIENT
Start: 2020-01-22 | End: 2020-04-15

## 2020-01-22 NOTE — PROGRESS NOTES
Subjective     Barrington Cárdenas is a 39 year old male who presents to clinic today for the following health issues:    HPI   Concern - oral thrush   Onset: x 1 month on & off     Description:   Swollen, infection and painful, dry mouth     Intensity: moderate    Progression of Symptoms:  same    Accompanying Signs & Symptoms:  Sleeps with mouth open     Previous history of similar problem:   No   Patient is adopted. No history of diabetes. Last physical was one year    Review of Systems   ROS COMP: Constitutional, HEENT, cardiovascular, pulmonary, gi and gu systems are negative, except as otherwise noted.      Objective    /60 (BP Location: Right arm, Cuff Size: Adult Large)   Pulse 88   Temp 97.3  F (36.3  C) (Tympanic)   Wt 108 kg (238 lb)   SpO2 95%   BMI 31.40 kg/m    Body mass index is 31.4 kg/m .  Physical Exam   GENERAL: healthy, alert and no distress  EYES: Eyes grossly normal to inspection, PERRL and conjunctivae and sclerae normal  HENT: ear canals and TM's normal, nose and mouth with cracking of the inner lips with whiteness present  NECK: no adenopathy  RESP: lungs clear to auscultation - no rales, rhonchi or wheezes  CV: regular rate and rhythm, normal S1 S2, no S3 or S4    Diagnostic Test Results:  No results found for this or any previous visit (from the past 24 hour(s)).        Assessment & Plan   (B37.0) Candidiasis of mouth  (primary encounter diagnosis)  Comment: begin oral anti fungal as directed. Call if symptoms persist or worsen.   Plan: fluconazole (DIFLUCAN) 100 MG tablet          Gamaliel Barboza PA-C  [facility]

## 2020-02-10 ENCOUNTER — HEALTH MAINTENANCE LETTER (OUTPATIENT)
Age: 40
End: 2020-02-10

## 2020-03-30 ENCOUNTER — E-VISIT (OUTPATIENT)
Dept: PEDIATRICS | Facility: CLINIC | Age: 40
End: 2020-03-30
Payer: COMMERCIAL

## 2020-03-30 DIAGNOSIS — K11.5 SALIVARY DUCT STONE: Primary | ICD-10-CM

## 2020-03-30 PROCEDURE — 99422 OL DIG E/M SVC 11-20 MIN: CPT | Performed by: INTERNAL MEDICINE

## 2020-04-04 ENCOUNTER — TRANSFERRED RECORDS (OUTPATIENT)
Dept: HEALTH INFORMATION MANAGEMENT | Facility: CLINIC | Age: 40
End: 2020-04-04

## 2020-04-15 ENCOUNTER — OFFICE VISIT (OUTPATIENT)
Dept: PEDIATRICS | Facility: CLINIC | Age: 40
End: 2020-04-15
Payer: COMMERCIAL

## 2020-04-15 ENCOUNTER — TELEPHONE (OUTPATIENT)
Dept: PEDIATRICS | Facility: CLINIC | Age: 40
End: 2020-04-15

## 2020-04-15 VITALS
OXYGEN SATURATION: 98 % | DIASTOLIC BLOOD PRESSURE: 75 MMHG | BODY MASS INDEX: 31.33 KG/M2 | SYSTOLIC BLOOD PRESSURE: 122 MMHG | HEART RATE: 77 BPM | RESPIRATION RATE: 16 BRPM | WEIGHT: 237.5 LBS | TEMPERATURE: 97.3 F

## 2020-04-15 DIAGNOSIS — R22.1 LUMP IN THROAT: Primary | ICD-10-CM

## 2020-04-15 PROCEDURE — 99213 OFFICE O/P EST LOW 20 MIN: CPT | Performed by: INTERNAL MEDICINE

## 2020-04-15 NOTE — PROGRESS NOTES
Subjective     Barrington Cárdenas is a 39 year old male who presents to clinic today for the following health issues:    HPI   RESPIRATORY SYMPTOMS      Duration: since late January, been getting worse in the last week or two     Description  Intermittent pain under the left ear pain that goes below the left side of the jaw and lump in throat that has localized pain     Severity: moderate    Accompanying signs and symptoms: None    History (predisposing factors):  none    Precipitating or alleviating factors: None    Therapies tried and outcome:  none    Patient is a 39-year-old male without significant past medical history who presents with complaints of left ear pain and a lump in the back of his throat.  Patient states that he initially started having ENT problems back in January with mainly dry mouth.  He was treated on a couple occasions for thrush.  He does have a history of allergies and uses Flonase as needed for that.  In January he noted a little bit of left neck discomfort and this has basically lingered since.  He comes in now because he is also noticed a lump in the back of his throat also.  Currently he denies any fevers, chills or night sweats.  He has had no korina ear pain but notices some discomfort under his left jaw area.  He denies any sore throat but did notice a spot on the left posterior oropharynx.  He has occasional slight postnasal drip.  He denies any cough, shortness of breath or chest pain.  He was evaluated with an E visit a couple of weeks ago and it was thought that maybe he had a salivary duct stone and it was recommended that he increase fluids and eat lemon drops.  He feels like that got a little bit better.  He now has had increasing symptoms over the last week with a lump and ear pain.  He does have a history of chronic reflux and uses Prilosec regularly for that has not had any new symptoms in that regard.  He denies any trouble swallowing or drooling.  He denies any dysphasia  or odynophasia.  He says it seems to be a little bit worse with eating and in the evening.  He does not have a significant tobacco history and has no other complaints today.    Patient Active Problem List   Diagnosis     Obesity, unspecified classification, unspecified obesity type, unspecified whether serious comorbidity present     Past Surgical History:   Procedure Laterality Date     SURGICAL HISTORY OF -       Tonsils, age 3     TONSILLECTOMY         Social History     Tobacco Use     Smoking status: Never Smoker     Smokeless tobacco: Former User   Substance Use Topics     Alcohol use: Yes     Comment: socially     Family History   Family history unknown: Yes         Current Outpatient Medications   Medication Sig Dispense Refill     fluticasone (FLONASE) 50 MCG/ACT nasal spray Spray 1 spray into both nostrils daily 9.9 mL 0     OMEPRAZOLE PO Take 20 mg by mouth daily        No Known Allergies    Objective    /75 (BP Location: Left arm, Patient Position: Sitting, Cuff Size: Adult Large)   Pulse 77   Temp 97.3  F (36.3  C)   Resp 16   Wt 107.7 kg (237 lb 8 oz)   SpO2 98%   BMI 31.33 kg/m    Body mass index is 31.33 kg/m .  Physical Exam   GENERAL: healthy, alert and no distress  EYES: Eyes grossly normal to inspection  HENT: ear canals and TM's normal, nose and mouth without ulcers.  There is a small papular lesion on the left posterior oropharynx.  No erythema or exudate noted.  NECK: no adenopathy  RESP: lungs clear to auscultation - no rales, rhonchi or wheezes  CV: regular rates and rhythm, normal S1 S2, no S3 or S4, no murmur, click or rub and no peripheral edema     Assessment/Plan:    1. Lump in throat  Plan:    - OTOLARYNGOLOGY REFERRAL    Please schedule an appointment with ENT as discussed.  Follow up with your primary provider after your ENT appointment, sooner if needed.

## 2020-04-15 NOTE — TELEPHONE ENCOUNTER
Received call from pt with concerns of a lump in his throat and ear pain which are getting worse since his virtual visit on 3/30/20    Pt has pain when eating and difficulty swallowing and ear pain   Pt's wife who's a RN looked in is throat and saw a lump on the left side  Pt feels like his ear is clogged or blocked, has ringing in his ears.    No nausea, vomiting, fever, cough    Appt made for today with provider    Thank you  Lucy Ahumada RN on 4/15/2020 at 9:25 AM

## 2020-04-15 NOTE — PATIENT INSTRUCTIONS
Please schedule an appointment with ENT as discussed.  Follow up with your primary provider after your ENT appointment, sooner if needed.

## 2020-04-17 ENCOUNTER — TRANSFERRED RECORDS (OUTPATIENT)
Dept: HEALTH INFORMATION MANAGEMENT | Facility: CLINIC | Age: 40
End: 2020-04-17

## 2020-11-16 ENCOUNTER — HEALTH MAINTENANCE LETTER (OUTPATIENT)
Age: 40
End: 2020-11-16

## 2021-02-03 ENCOUNTER — OFFICE VISIT (OUTPATIENT)
Dept: PEDIATRICS | Facility: CLINIC | Age: 41
End: 2021-02-03
Payer: COMMERCIAL

## 2021-02-03 VITALS
HEART RATE: 91 BPM | SYSTOLIC BLOOD PRESSURE: 138 MMHG | BODY MASS INDEX: 32.21 KG/M2 | DIASTOLIC BLOOD PRESSURE: 76 MMHG | WEIGHT: 251 LBS | TEMPERATURE: 98.1 F | OXYGEN SATURATION: 96 % | HEIGHT: 74 IN

## 2021-02-03 DIAGNOSIS — B37.0 CANDIDIASIS OF MOUTH: Primary | ICD-10-CM

## 2021-02-03 PROCEDURE — 99213 OFFICE O/P EST LOW 20 MIN: CPT | Performed by: PHYSICIAN ASSISTANT

## 2021-02-03 RX ORDER — FLUCONAZOLE 100 MG/1
TABLET ORAL
Qty: 11 TABLET | Refills: 0 | Status: SHIPPED | OUTPATIENT
Start: 2021-02-03 | End: 2023-09-06

## 2021-02-03 ASSESSMENT — MIFFLIN-ST. JEOR: SCORE: 2113.28

## 2021-02-03 NOTE — PROGRESS NOTES
"  Assessment & Plan   Candidiasis of mouth  Begin treatment as directed. Encouraged patient to return for physical, r/o DM, etc.  - fluconazole (DIFLUCAN) 100 MG tablet; Take 2 tablets on day 1, then one tab for 9 days    DWAYNE Huitron [facility] ROBERTO Ferris is a 40 year old who presents to clinic today for the following health issues:  HPI   Concern - Dry mouth, cracked lip inside mouth and swollen right side  Onset: 1 week   Description: bad breath due to the dry mouth, bad breath - swollen   Intensity: mild  Progression of Symptoms:  improving  Precipitating factors:        Worsened by: sleeping -   Alleviating factors:        Improved by: suck on lemon drops   Therapies tried and outcome: mouthwash - no improvement     History of thrush    Review of Systems   Constitutional, HEENT, cardiovascular, pulmonary, gi and gu systems are negative, except as otherwise noted.      Objective    /76 (BP Location: Right arm, Cuff Size: Adult Large)   Pulse 91   Temp 98.1  F (36.7  C) (Tympanic)   Ht 1.88 m (6' 2\")   Wt 113.9 kg (251 lb)   SpO2 96%   BMI 32.23 kg/m    Body mass index is 32.23 kg/m .  Physical Exam   GENERAL: healthy, alert and no distress  Mouth: thrush sublingually and open fissures of the inner lower lip  "

## 2021-02-18 ENCOUNTER — OFFICE VISIT (OUTPATIENT)
Dept: FAMILY MEDICINE | Facility: CLINIC | Age: 41
End: 2021-02-18
Payer: COMMERCIAL

## 2021-02-18 VITALS
HEART RATE: 82 BPM | DIASTOLIC BLOOD PRESSURE: 83 MMHG | SYSTOLIC BLOOD PRESSURE: 136 MMHG | TEMPERATURE: 97.8 F | WEIGHT: 250 LBS | BODY MASS INDEX: 32.1 KG/M2

## 2021-02-18 DIAGNOSIS — R68.2 DRY MOUTH: Primary | ICD-10-CM

## 2021-02-18 DIAGNOSIS — Z86.19 HISTORY OF THRUSH: ICD-10-CM

## 2021-02-18 PROCEDURE — 99213 OFFICE O/P EST LOW 20 MIN: CPT | Performed by: FAMILY MEDICINE

## 2021-02-18 NOTE — PROGRESS NOTES
"    Assessment & Plan     Dry mouth  Could be situational given the dry weather.  Patient is relieved that thrush is gone and does not wish to pursue any additional evaluation at this time.  Given that his mouth also appears appropriately moist - without any other concerning symptoms  - I agree that ENT is not necessary at this time.   Discussed warning signs/symptoms for which he needs followup.      History of thrush  Resolved.               BMI:   Estimated body mass index is 32.1 kg/m  as calculated from the following:    Height as of 2/3/21: 1.88 m (6' 2\").    Weight as of this encounter: 113.4 kg (250 lb).           No follow-ups on file.    Maribell Pierre MD  [facility]    Florina Balderas is a 40 year old who presents for the following health issues     HPI     Did diflucan again as prescribed in early Feb- just finished Friday.  But then noticed lip started to crack again.  This morning dry mouth was much worse.  Has put a humidifier in his home, but humidity still seems low.    No symptoms of dry eyes, constipation, hair loss, dry skin.      Concern - thrush   Onset: 3 weeks   Description: dry mouth, cracked inside of the lip   Intensity: mild  Progression of Symptoms:  improving  Accompanying Signs & Symptoms: none  Previous history of similar problem: yes, last year   Precipitating factors:        Worsened by: none  Alleviating factors:        Improved by:   Therapies tried and outcome: Diflucan, helped a little with symptoms.         Review of Systems   Constitutional, HEENT, cardiovascular, pulmonary, gi and gu systems are negative, except as otherwise noted.      Objective    /83   Pulse 82   Temp 97.8  F (36.6  C) (Oral)   Wt 113.4 kg (250 lb)    cm (74\")   BMI 32.10 kg/m    Body mass index is 32.1 kg/m .  Physical Exam   GENERAL: healthy, alert and no distress  HENT: oropharynx clear and oral mucous membranes appear moist  PSYCH: mentation appears " normal, affect normal/bright

## 2021-04-03 ENCOUNTER — HEALTH MAINTENANCE LETTER (OUTPATIENT)
Age: 41
End: 2021-04-03

## 2021-05-27 ENCOUNTER — RECORDS - HEALTHEAST (OUTPATIENT)
Dept: ADMINISTRATIVE | Facility: CLINIC | Age: 41
End: 2021-05-27

## 2021-09-18 ENCOUNTER — HEALTH MAINTENANCE LETTER (OUTPATIENT)
Age: 41
End: 2021-09-18

## 2022-04-30 ENCOUNTER — HEALTH MAINTENANCE LETTER (OUTPATIENT)
Age: 42
End: 2022-04-30

## 2022-07-12 ENCOUNTER — ANCILLARY PROCEDURE (OUTPATIENT)
Dept: GENERAL RADIOLOGY | Facility: CLINIC | Age: 42
End: 2022-07-12
Attending: PEDIATRICS
Payer: COMMERCIAL

## 2022-07-12 ENCOUNTER — OFFICE VISIT (OUTPATIENT)
Dept: ORTHOPEDICS | Facility: CLINIC | Age: 42
End: 2022-07-12
Payer: COMMERCIAL

## 2022-07-12 VITALS
SYSTOLIC BLOOD PRESSURE: 126 MMHG | WEIGHT: 255 LBS | BODY MASS INDEX: 32.73 KG/M2 | DIASTOLIC BLOOD PRESSURE: 78 MMHG | HEIGHT: 74 IN

## 2022-07-12 DIAGNOSIS — S49.92XA INJURY OF LEFT SHOULDER, INITIAL ENCOUNTER: ICD-10-CM

## 2022-07-12 DIAGNOSIS — M89.8X1 PERISCAPULAR PAIN: ICD-10-CM

## 2022-07-12 DIAGNOSIS — S49.92XA INJURY OF LEFT SHOULDER, INITIAL ENCOUNTER: Primary | ICD-10-CM

## 2022-07-12 PROCEDURE — 73030 X-RAY EXAM OF SHOULDER: CPT | Mod: TC | Performed by: RADIOLOGY

## 2022-07-12 PROCEDURE — 99204 OFFICE O/P NEW MOD 45 MIN: CPT | Performed by: PEDIATRICS

## 2022-07-12 RX ORDER — CYCLOBENZAPRINE HCL 5 MG
TABLET ORAL
Qty: 30 TABLET | Refills: 0 | Status: SHIPPED | OUTPATIENT
Start: 2022-07-12 | End: 2023-09-06

## 2022-07-12 NOTE — PATIENT INSTRUCTIONS
Injury to the left shoulder area is most consistent with contusion and ongoing muscular pain, with muscular tension/tightness as well.  Symptom management with icing, heat, over-the-counter medication as needed.  Discussed additional option with muscle relaxant; cyclobenzaprine prescription placed.  Discussed options with rehab; physical therapy referral placed.  We discussed consideration of additional imaging of the affected area with MRI, but this is not required currently given assessment and plan for other intervention.  Plan to monitor course 3-4 weeks with therapy to start. Contact clinic if any questions/concerns.    If you have any further questions for your physician or physician s care team you can call [phone] and use option 3 to leave a voice message. Calls received during business hours will be returned same day.
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
## 2022-11-11 ENCOUNTER — OFFICE VISIT (OUTPATIENT)
Dept: ORTHOPEDICS | Facility: CLINIC | Age: 42
End: 2022-11-11
Payer: COMMERCIAL

## 2022-11-11 VITALS
SYSTOLIC BLOOD PRESSURE: 124 MMHG | DIASTOLIC BLOOD PRESSURE: 82 MMHG | BODY MASS INDEX: 32.73 KG/M2 | WEIGHT: 255 LBS | HEIGHT: 74 IN

## 2022-11-11 DIAGNOSIS — S49.92XD INJURY OF LEFT SHOULDER, SUBSEQUENT ENCOUNTER: ICD-10-CM

## 2022-11-11 DIAGNOSIS — M89.8X1 PERISCAPULAR PAIN: Primary | ICD-10-CM

## 2022-11-11 PROCEDURE — 99213 OFFICE O/P EST LOW 20 MIN: CPT | Performed by: PEDIATRICS

## 2022-11-11 NOTE — LETTER
11/11/2022         RE: Barrington Cárdenas  1929 [facility] [address]        Dear Colleague,    Thank you for referring your patient, Barrington Cárdenas, to the [facility]. Please see a copy of my visit note below.    ASSESSMENT & PLAN    Barrington was seen today for recheck.    Diagnoses and all orders for this visit:    Periscapular pain  -     Physical Therapy Referral; Future    Injury of left shoulder, subsequent encounter  -     Physical Therapy Referral; Future        See Patient Instructions  Patient Instructions   Posterior shoulder/periscapular pain remains most consistent with muscular source.  Previous imaging from the emergency department and shoulder x-rays here did not demonstrate any clear structural concern to cause symptoms.  We discussed options again with imaging, rehab approach, also medications.  No additional imaging appears required currently, though if persistent issues despite other treatment, we could consider MRI of the area.  Otherwise, plan physical therapy next.  Updated order placed, to establish connection for PT.  Plan to monitor course 1 month with therapy to start.  Contact clinic if not improving during that time, sooner if any questions or concerns.  In the meantime, you can use the Flexeril that she have if desired, not required.      If you have any further questions for your physician or physician s care team you can contact them thru MyChart or by calling  [phone] and use option 3 to leave a voice message.   Messages received during business hours will be returned same day.          Skip Benton,   [facility]    SUBJECTIVE- Interim History November 11, 2022    Chief Complaint   Patient presents with     Left Shoulder - RECHECK       Barrington Cárdenas is a 42 year old male who is seen in f/u up for    Periscapular pain  Injury of left shoulder, subsequent encounter. Since  "last visit on 7/12/22 patient has continued to have pain.  Did have a period of time where his shoulder did improve, but anytime he uses his shoulder for lifting or activity he has pain for at least a week later.  Pain is worse with overhead activities.   Does feel that his shoulder is weaker.    Pain is mostly in the posterior aspect of his shoulder.   Denies any numbness or tingling.    .  - 6/28/22 Now ~ 19 weeks from initial onset    **  Waxing/waning course. Can get increased pain from activities, lasts for ~1-1.5 weeks, and then improves again. Has become a cycle.  Notes something popping in shoulder area with rotating shoulder, actually some benefit from the pop.    With pain, notes a knot in left upper trap, and then pain along medial scapular border.  Pain generally with arm more away from body, bear if repetitive motions. Pain is more after activity, rather than during.    REVIEW OF SYSTEMS:  Review of Systems      OBJECTIVE:  /82   Ht 1.88 m (6' 2\")   Wt 115.7 kg (255 lb)   BMI 32.74 kg/m         Cervical Spine Exam    Range of Motion:       forward flexion          extension         lateral flexion   Grossly intact, no change in pain    Inspection:       No visible deformity        normal lordotic curvature maintained    Tender:      upper border of trapezius left  Also some tenderness midline mid thoracic area, otherwise no spine area tenderness      Some tightness left side with rolling shoulders, shoulder shrug.          Left Shoulder exam    ROM:      forward flexion with some pain with hand overhead        abduction grossly intact, no pain       internal rotation no pain, intact       external rotation no pain, intact    Tender:      posterior shoulder       periscapular region    Strength:      abduction        internal rotation        external rotation   Grossly symmetric      RADIOLOGY:  None new. See previous notes.              Again, thank you for allowing me to participate in the care " of your patient.        Sincerely,        Skip Benton, DO

## 2022-11-11 NOTE — PATIENT INSTRUCTIONS
Posterior shoulder/periscapular pain remains most consistent with muscular source.  Previous imaging from the emergency department and shoulder x-rays here did not demonstrate any clear structural concern to cause symptoms.  We discussed options again with imaging, rehab approach, also medications.  No additional imaging appears required currently, though if persistent issues despite other treatment, we could consider MRI of the area.  Otherwise, plan physical therapy next.  Updated order placed, to establish connection for PT.  Plan to monitor course 1 month with therapy to start.  Contact clinic if not improving during that time, sooner if any questions or concerns.  In the meantime, you can use the Flexeril that she have if desired, not required.      If you have any further questions for your physician or physician s care team you can contact them thru [email or web address] or by calling  [phone] and use option 3 to leave a voice message.   Messages received during business hours will be returned same day.

## 2022-11-11 NOTE — PROGRESS NOTES
ASSESSMENT & PLAN    Barrington was seen today for recheck.    Diagnoses and all orders for this visit:    Periscapular pain  -     Physical Therapy Referral; Future    Injury of left shoulder, subsequent encounter  -     Physical Therapy Referral; Future        See Patient Instructions  Patient Instructions   Posterior shoulder/periscapular pain remains most consistent with muscular source.  Previous imaging from the emergency department and shoulder x-rays here did not demonstrate any clear structural concern to cause symptoms.  We discussed options again with imaging, rehab approach, also medications.  No additional imaging appears required currently, though if persistent issues despite other treatment, we could consider MRI of the area.  Otherwise, plan physical therapy next.  Updated order placed, to establish connection for PT.  Plan to monitor course 1 month with therapy to start.  Contact clinic if not improving during that time, sooner if any questions or concerns.  In the meantime, you can use the Flexeril that she have if desired, not required.      If you have any further questions for your physician or physician s care team you can contact them thru MyChart or by calling  [phone] and use option 3 to leave a voice message.   Messages received during business hours will be returned same day.          Skip Benton DO  [facility]    SUBJECTIVE- Interim History November 11, 2022    Chief Complaint   Patient presents with     Left Shoulder - RECHECK       Barrington Cárdenas is a 42 year old male who is seen in f/u up for    Periscapular pain  Injury of left shoulder, subsequent encounter. Since last visit on 7/12/22 patient has continued to have pain.  Did have a period of time where his shoulder did improve, but anytime he uses his shoulder for lifting or activity he has pain for at least a week later.  Pain is worse with overhead activities.   Does feel that  "his shoulder is weaker.    Pain is mostly in the posterior aspect of his shoulder.   Denies any numbness or tingling.    .  - 6/28/22 Now ~ 19 weeks from initial onset    **  Waxing/waning course. Can get increased pain from activities, lasts for ~1-1.5 weeks, and then improves again. Has become a cycle.  Notes something popping in shoulder area with rotating shoulder, actually some benefit from the pop.    With pain, notes a knot in left upper trap, and then pain along medial scapular border.  Pain generally with arm more away from body, bear if repetitive motions. Pain is more after activity, rather than during.    REVIEW OF SYSTEMS:  Review of Systems      OBJECTIVE:  /82   Ht 1.88 m (6' 2\")   Wt 115.7 kg (255 lb)   BMI 32.74 kg/m         Cervical Spine Exam    Range of Motion:       forward flexion          extension         lateral flexion   Grossly intact, no change in pain    Inspection:       No visible deformity        normal lordotic curvature maintained    Tender:      upper border of trapezius left  Also some tenderness midline mid thoracic area, otherwise no spine area tenderness      Some tightness left side with rolling shoulders, shoulder shrug.          Left Shoulder exam    ROM:      forward flexion with some pain with hand overhead        abduction grossly intact, no pain       internal rotation no pain, intact       external rotation no pain, intact    Tender:      posterior shoulder       periscapular region    Strength:      abduction        internal rotation        external rotation   Grossly symmetric      RADIOLOGY:  None new. See previous notes.          "

## 2022-11-19 ENCOUNTER — HEALTH MAINTENANCE LETTER (OUTPATIENT)
Age: 42
End: 2022-11-19

## 2022-11-25 ENCOUNTER — THERAPY VISIT (OUTPATIENT)
Dept: PHYSICAL THERAPY | Facility: CLINIC | Age: 42
End: 2022-11-25
Attending: PEDIATRICS
Payer: COMMERCIAL

## 2022-11-25 DIAGNOSIS — S49.92XD INJURY OF LEFT SHOULDER, SUBSEQUENT ENCOUNTER: ICD-10-CM

## 2022-11-25 DIAGNOSIS — M89.8X1 PERISCAPULAR PAIN: ICD-10-CM

## 2022-11-25 PROCEDURE — 97140 MANUAL THERAPY 1/> REGIONS: CPT | Mod: GP | Performed by: PHYSICAL THERAPIST

## 2022-11-25 PROCEDURE — 97110 THERAPEUTIC EXERCISES: CPT | Mod: GP | Performed by: PHYSICAL THERAPIST

## 2022-11-25 PROCEDURE — 97161 PT EVAL LOW COMPLEX 20 MIN: CPT | Mod: GP | Performed by: PHYSICAL THERAPIST
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
## 2022-12-02 ENCOUNTER — THERAPY VISIT (OUTPATIENT)
Dept: PHYSICAL THERAPY | Facility: CLINIC | Age: 42
End: 2022-12-02
Payer: COMMERCIAL

## 2022-12-02 DIAGNOSIS — M89.8X1 PERISCAPULAR PAIN: Primary | ICD-10-CM

## 2022-12-02 PROCEDURE — 97140 MANUAL THERAPY 1/> REGIONS: CPT | Mod: GP | Performed by: PHYSICAL THERAPIST

## 2022-12-02 PROCEDURE — 97110 THERAPEUTIC EXERCISES: CPT | Mod: GP | Performed by: PHYSICAL THERAPIST

## 2022-12-16 ENCOUNTER — THERAPY VISIT (OUTPATIENT)
Dept: PHYSICAL THERAPY | Facility: CLINIC | Age: 42
End: 2022-12-16
Payer: COMMERCIAL

## 2022-12-16 DIAGNOSIS — M89.8X1 PERISCAPULAR PAIN: Primary | ICD-10-CM

## 2022-12-16 PROCEDURE — 97140 MANUAL THERAPY 1/> REGIONS: CPT | Mod: GP | Performed by: PHYSICAL THERAPIST

## 2022-12-16 PROCEDURE — 97110 THERAPEUTIC EXERCISES: CPT | Mod: GP | Performed by: PHYSICAL THERAPIST

## 2023-06-01 ENCOUNTER — HEALTH MAINTENANCE LETTER (OUTPATIENT)
Age: 43
End: 2023-06-01

## 2023-07-25 ENCOUNTER — OFFICE VISIT (OUTPATIENT)
Dept: PEDIATRICS | Facility: CLINIC | Age: 43
End: 2023-07-25
Payer: COMMERCIAL

## 2023-07-25 VITALS
HEART RATE: 80 BPM | BODY MASS INDEX: 31.75 KG/M2 | DIASTOLIC BLOOD PRESSURE: 80 MMHG | WEIGHT: 247.4 LBS | HEIGHT: 74 IN | OXYGEN SATURATION: 97 % | RESPIRATION RATE: 16 BRPM | SYSTOLIC BLOOD PRESSURE: 128 MMHG | TEMPERATURE: 97.8 F

## 2023-07-25 DIAGNOSIS — B35.3 TINEA PEDIS OF LEFT FOOT: Primary | ICD-10-CM

## 2023-07-25 PROCEDURE — 99213 OFFICE O/P EST LOW 20 MIN: CPT | Performed by: PHYSICIAN ASSISTANT

## 2023-07-25 RX ORDER — FLUCONAZOLE 150 MG/1
150 TABLET ORAL
Qty: 3 TABLET | Refills: 0 | Status: SHIPPED | OUTPATIENT
Start: 2023-07-25 | End: 2023-08-11

## 2023-07-25 ASSESSMENT — PAIN SCALES - GENERAL: PAINLEVEL: NO PAIN (0)

## 2023-07-25 NOTE — PROGRESS NOTES
"  Assessment & Plan     Tinea pedis of left foot    - fluconazole (DIFLUCAN) 150 MG tablet; Take 1 tablet (150 mg) by mouth every 7 days For 2-3 weeks.    Patient was given a prescription for Diflucan for treatment of the athletes foot, that is improving, but not going away with topical treatment.  He was informed to take one tablet once a week for two weeks and can take it for a third week if needed.  He is to followup with any questions or concerns.    Patient agrees with and understands the plan today.           BMI:   Estimated body mass index is 31.76 kg/m  as calculated from the following:    Height as of this encounter: 1.88 m (6' 2\").    Weight as of this encounter: 112.2 kg (247 lb 6.4 oz).     DWAYNE Martinez [facility] ROBERTO Balderas is a 42 year old, presenting for the following health issues:  RECHECK      7/25/2023     4:14 PM   Additional Questions   Roomed by elena   Accompanied by mary         7/25/2023     4:14 PM   Patient Reported Additional Medications   Patient reports taking the following new medications na     History of Present Illness       Reason for visit:  Athlete's foot  Symptom onset:  More than a month  Symptoms include:  Scaling and redness between toes on left foot  Symptom intensity:  Mild  Symptom progression:  Improving  Had these symptoms before:  Yes  Has tried/received treatment for these symptoms:  Yes  Previous treatment was successful:  Yes  Prior treatment description:  Otc cream  What makes it worse:  Wearing hot boots  What makes it better:  Wearing flip flops    He eats 4 or more servings of fruits and vegetables daily.He consumes 1 sweetened beverage(s) daily.He exercises with enough effort to increase his heart rate 10 to 19 minutes per day.  He exercises with enough effort to increase his heart rate 3 or less days per week.   He is taking medications regularly.     Patient wears steel toed boots to work and has had issues with " "athletes foot in the past, but this more recent bout has been sticking around longer.  It has now been over a month and though it is much improved, there is still some irritation and rash and when he wears his boots, it worsens.        Review of Systems   Constitutional, HEENT, cardiovascular, pulmonary, gi and gu systems are negative, except as otherwise noted.      Objective    /80   Pulse 80   Temp 97.8  F (36.6  C) (Tympanic)   Resp 16   Ht 1.88 m (6' 2\")   Wt 112.2 kg (247 lb 6.4 oz)   SpO2 97%   BMI 31.76 kg/m    Body mass index is 31.76 kg/m .  Physical Exam   GENERAL: healthy, alert and no distress  EYES: Eyes grossly normal to inspection, PERRL and conjunctivae and sclerae normal  MS: no gross musculoskeletal defects noted, no edema  SKIN: no suspicious lesions or rashes  SKIN: Left foot with mild athletes foot rash between 2-3 and 3-4 toes.  No edema or erythema or sign of infection.    PSYCH: mentation appears normal, affect normal/bright      Haven Huston PA-C              "

## 2023-08-11 ENCOUNTER — MYC MEDICAL ADVICE (OUTPATIENT)
Dept: PEDIATRICS | Facility: CLINIC | Age: 43
End: 2023-08-11
Payer: COMMERCIAL

## 2023-08-11 DIAGNOSIS — B35.3 TINEA PEDIS OF LEFT FOOT: ICD-10-CM

## 2023-08-11 RX ORDER — FLUCONAZOLE 150 MG/1
150 TABLET ORAL
Qty: 3 TABLET | Refills: 0 | Status: SHIPPED | OUTPATIENT
Start: 2023-08-11 | End: 2023-10-23

## 2023-08-11 NOTE — TELEPHONE ENCOUNTER
"Haven Huston/Covering Provider: Pt seeking refill of diflucan for tinea pedis of left food. Pt seen on 07/25/2023.    Per that visit:  Patient was given a prescription for Diflucan for treatment of the athletes foot, that is improving, but not going away with topical treatment.  He was informed to take one tablet once a week for two weeks and can take it for a third week if needed. He is to followup with any questions or concerns.  Patient agrees with and understands the plan today.       - Edgardo \"Jermain\" Tim (he/him/his), RN - Patient Advocate Liason (PAL)  [facility]    "

## 2023-09-06 ENCOUNTER — VIRTUAL VISIT (OUTPATIENT)
Dept: FAMILY MEDICINE | Facility: CLINIC | Age: 43
End: 2023-09-06
Payer: COMMERCIAL

## 2023-09-06 DIAGNOSIS — B35.3 TINEA PEDIS OF LEFT FOOT: Primary | ICD-10-CM

## 2023-09-06 PROCEDURE — 99214 OFFICE O/P EST MOD 30 MIN: CPT | Mod: VID | Performed by: STUDENT IN AN ORGANIZED HEALTH CARE EDUCATION/TRAINING PROGRAM

## 2023-09-06 RX ORDER — CICLOPIROX OLAMINE 7.7 MG/G
CREAM TOPICAL 2 TIMES DAILY
Qty: 30 G | Refills: 3 | Status: SHIPPED | OUTPATIENT
Start: 2023-09-06 | End: 2023-10-23

## 2023-09-06 RX ORDER — CICLOPIROX 80 MG/ML
SOLUTION TOPICAL
Qty: 6.6 ML | Refills: 11 | Status: SHIPPED | OUTPATIENT
Start: 2023-09-06 | End: 2023-09-06

## 2023-09-06 NOTE — PATIENT INSTRUCTIONS
Mode Balderas,    Thank you for allowing [facility] to manage your care.      I sent your prescriptions to your pharmacy.      I made a referral, they will be calling in approximately 1 week to set up your appointment.  If you do not hear from them, please call the specialty number on your after visit.     For your convenience, test results are released as soon as they are available  Please allow 1-2 business days for me to send you a comment about your results.  If not done so, I encourage you to login into [employer] ([email or web address] to review your results in real time.     If you have any questions or concerns, please feel free to call us at [phone].    Sincerely,    Dr. Taylor    Did you know?      You can schedule a video visit for follow-up appointments as well as future appointments for certain conditions.  Please see the below link.     [email or web address]    If you have not already done so,  I encourage you to sign up for [employer] ([email or web address]).  This will allow you to review your results, securely communicate with a provider, and schedule virtual visits as well.

## 2023-09-06 NOTE — PROGRESS NOTES
Andrey is a 42 year old who is being evaluated via a billable video visit.      How would you like to obtain your AVS? MyChart  If the video visit is dropped, the invitation should be resent by: Text to cell phone: [phone]  Will anyone else be joining your video visit? No          Assessment & Plan     1. Tinea pedis of left foot  Recurrent tinea pedis. Failed 6 weeks of treatment with Fluconazole. Possibility of antifungal resistance.  - REVIEW OF HEALTH MAINTENANCE PROTOCOL ORDERS  - Orthopedic  Referral; Future  - ciclopirox (LOPROX) 0.77 % cream; Apply topically 2 times daily to affected areas and surrounding skin  Dispense: 30 g; Refill: 3            Thalia Taylor MD  [facility] FORTUNATO Balderas is a 42 year old, presenting for the following health issues:  Foot Problems        9/6/2023     3:56 PM   Additional Questions   Roomed by CLAUDIA       History of Present Illness       Reason for visit:  Athlete's foot    He eats 4 or more servings of fruits and vegetables daily.He consumes 1 sweetened beverage(s) daily.He exercises with enough effort to increase his heart rate 10 to 19 minutes per day.  He exercises with enough effort to increase his heart rate 3 or less days per week.   He is taking medications regularly.       Concern - Athlete's foot   Onset: 6 weeks   Description: Being treated for athlete's foot but medications isn't working . Finished 6 weeks of  oral fluconazole with out much improvement  Intensity: moderate  Progression of Symptoms:  same  Accompanying Signs & Symptoms:   Previous history of similar problem:   Precipitating factors:        Worsened by: unknown  Alleviating factors:        Improved by: unknown  Therapies tried and outcome: as above        Review of Systems         Objective           Vitals:  No vitals were obtained today due to virtual visit.    Physical Exam   GENERAL: Healthy, alert and no distress  EYES: Eyes grossly normal to  inspection.  No discharge or erythema, or obvious scleral/conjunctival abnormalities.  RESP: No audible wheeze, cough, or visible cyanosis.  No visible retractions or increased work of breathing.   PSYCH: Mentation appears normal, affect normal/bright, judgement and insight intact, normal speech and appearance well-groomed.            Video-Visit Details    Type of service:  Video Visit     Originating Location (pt. Location): Home    Distant Location (provider location):  On-site  Platform used for Video Visit: [employer]

## 2023-10-23 ENCOUNTER — APPOINTMENT (OUTPATIENT)
Dept: MRI IMAGING | Facility: HOSPITAL | Age: 43
End: 2023-10-23
Attending: EMERGENCY MEDICINE
Payer: COMMERCIAL

## 2023-10-23 ENCOUNTER — HOSPITAL ENCOUNTER (INPATIENT)
Facility: HOSPITAL | Age: 43
LOS: 2 days | Discharge: HOME | End: 2023-10-25
Attending: EMERGENCY MEDICINE | Admitting: INTERNAL MEDICINE
Payer: COMMERCIAL

## 2023-10-23 ENCOUNTER — APPOINTMENT (OUTPATIENT)
Dept: CT IMAGING | Facility: HOSPITAL | Age: 43
End: 2023-10-23
Attending: EMERGENCY MEDICINE
Payer: COMMERCIAL

## 2023-10-23 DIAGNOSIS — G35 MULTIPLE SCLEROSIS (H): Primary | ICD-10-CM

## 2023-10-23 DIAGNOSIS — G35 RELAPSING REMITTING MULTIPLE SCLEROSIS (H): ICD-10-CM

## 2023-10-23 DIAGNOSIS — H53.8 BLURRY VISION, RIGHT EYE: ICD-10-CM

## 2023-10-23 DIAGNOSIS — H46.9 OPTIC NEURITIS: ICD-10-CM

## 2023-10-23 LAB
ANION GAP SERPL CALCULATED.3IONS-SCNC: 16 MMOL/L (ref 7–15)
APTT PPP: 34 SECONDS (ref 22–38)
BASO+EOS+MONOS # BLD AUTO: NORMAL 10*3/UL
BASO+EOS+MONOS NFR BLD AUTO: NORMAL %
BASOPHILS # BLD AUTO: 0.1 10E3/UL (ref 0–0.2)
BASOPHILS NFR BLD AUTO: 1 %
BUN SERPL-MCNC: 13.4 MG/DL (ref 6–20)
CALCIUM SERPL-MCNC: 9.2 MG/DL (ref 8.6–10)
CHLORIDE SERPL-SCNC: 105 MMOL/L (ref 98–107)
CREAT SERPL-MCNC: 0.86 MG/DL (ref 0.67–1.17)
CRP SERPL-MCNC: 4.7 MG/L
DEPRECATED HCO3 PLAS-SCNC: 17 MMOL/L (ref 22–29)
EGFRCR SERPLBLD CKD-EPI 2021: >90 ML/MIN/1.73M2
EOSINOPHIL # BLD AUTO: 0.2 10E3/UL (ref 0–0.7)
EOSINOPHIL NFR BLD AUTO: 4 %
ERYTHROCYTE [DISTWIDTH] IN BLOOD BY AUTOMATED COUNT: 13.2 % (ref 10–15)
ERYTHROCYTE [SEDIMENTATION RATE] IN BLOOD BY WESTERGREN METHOD: 8 MM/HR (ref 0–15)
GLUCOSE BLDC GLUCOMTR-MCNC: 151 MG/DL (ref 70–99)
GLUCOSE SERPL-MCNC: 101 MG/DL (ref 70–99)
HCT VFR BLD AUTO: 46.6 % (ref 40–53)
HGB BLD-MCNC: 16.4 G/DL (ref 13.3–17.7)
IMM GRANULOCYTES # BLD: 0 10E3/UL
IMM GRANULOCYTES NFR BLD: 0 %
INR PPP: 1.01 (ref 0.85–1.15)
LYMPHOCYTES # BLD AUTO: 1.7 10E3/UL (ref 0.8–5.3)
LYMPHOCYTES NFR BLD AUTO: 26 %
MCH RBC QN AUTO: 30.5 PG (ref 26.5–33)
MCHC RBC AUTO-ENTMCNC: 35.2 G/DL (ref 31.5–36.5)
MCV RBC AUTO: 87 FL (ref 78–100)
MONOCYTES # BLD AUTO: 0.6 10E3/UL (ref 0–1.3)
MONOCYTES NFR BLD AUTO: 9 %
NEUTROPHILS # BLD AUTO: 3.9 10E3/UL (ref 1.6–8.3)
NEUTROPHILS NFR BLD AUTO: 60 %
NRBC # BLD AUTO: 0 10E3/UL
NRBC BLD AUTO-RTO: 0 /100
PLATELET # BLD AUTO: 212 10E3/UL (ref 150–450)
POTASSIUM SERPL-SCNC: 4.9 MMOL/L (ref 3.4–5.3)
RBC # BLD AUTO: 5.38 10E6/UL (ref 4.4–5.9)
SODIUM SERPL-SCNC: 138 MMOL/L (ref 135–145)
WBC # BLD AUTO: 6.5 10E3/UL (ref 4–11)

## 2023-10-23 PROCEDURE — 85652 RBC SED RATE AUTOMATED: CPT | Performed by: FAMILY MEDICINE

## 2023-10-23 PROCEDURE — 258N000003 HC RX IP 258 OP 636: Performed by: EMERGENCY MEDICINE

## 2023-10-23 PROCEDURE — 70450 CT HEAD/BRAIN W/O DYE: CPT

## 2023-10-23 PROCEDURE — 99285 EMERGENCY DEPT VISIT HI MDM: CPT | Mod: 25

## 2023-10-23 PROCEDURE — 80048 BASIC METABOLIC PNL TOTAL CA: CPT | Performed by: EMERGENCY MEDICINE

## 2023-10-23 PROCEDURE — 250N000011 HC RX IP 250 OP 636: Mod: JZ | Performed by: EMERGENCY MEDICINE

## 2023-10-23 PROCEDURE — 36415 COLL VENOUS BLD VENIPUNCTURE: CPT | Performed by: FAMILY MEDICINE

## 2023-10-23 PROCEDURE — 70553 MRI BRAIN STEM W/O & W/DYE: CPT

## 2023-10-23 PROCEDURE — 85610 PROTHROMBIN TIME: CPT | Performed by: EMERGENCY MEDICINE

## 2023-10-23 PROCEDURE — 255N000002 HC RX 255 OP 636: Mod: JZ | Performed by: EMERGENCY MEDICINE

## 2023-10-23 PROCEDURE — 85025 COMPLETE CBC W/AUTO DIFF WBC: CPT | Performed by: EMERGENCY MEDICINE

## 2023-10-23 PROCEDURE — 86140 C-REACTIVE PROTEIN: CPT | Performed by: FAMILY MEDICINE

## 2023-10-23 PROCEDURE — A9585 GADOBUTROL INJECTION: HCPCS | Mod: JZ | Performed by: EMERGENCY MEDICINE

## 2023-10-23 PROCEDURE — 99223 1ST HOSP IP/OBS HIGH 75: CPT | Performed by: INTERNAL MEDICINE

## 2023-10-23 PROCEDURE — 82962 GLUCOSE BLOOD TEST: CPT

## 2023-10-23 PROCEDURE — 70546 MR ANGIOGRAPH HEAD W/O&W/DYE: CPT

## 2023-10-23 PROCEDURE — 36415 COLL VENOUS BLD VENIPUNCTURE: CPT | Performed by: EMERGENCY MEDICINE

## 2023-10-23 PROCEDURE — 85730 THROMBOPLASTIN TIME PARTIAL: CPT | Performed by: EMERGENCY MEDICINE

## 2023-10-23 PROCEDURE — 93005 ELECTROCARDIOGRAM TRACING: CPT | Performed by: INTERNAL MEDICINE

## 2023-10-23 PROCEDURE — 120N000001 HC R&B MED SURG/OB

## 2023-10-23 PROCEDURE — 70544 MR ANGIOGRAPHY HEAD W/O DYE: CPT

## 2023-10-23 RX ORDER — GADOBUTROL 604.72 MG/ML
10 INJECTION INTRAVENOUS ONCE
Status: COMPLETED | OUTPATIENT
Start: 2023-10-23 | End: 2023-10-23

## 2023-10-23 RX ORDER — ACETAMINOPHEN 650 MG/1
650 SUPPOSITORY RECTAL EVERY 6 HOURS PRN
Status: DISCONTINUED | OUTPATIENT
Start: 2023-10-23 | End: 2023-10-25 | Stop reason: HOSPADM

## 2023-10-23 RX ORDER — METHYLPREDNISOLONE SODIUM SUCCINATE 125 MG/2ML
1000 INJECTION, POWDER, LYOPHILIZED, FOR SOLUTION INTRAMUSCULAR; INTRAVENOUS ONCE
Status: DISCONTINUED | OUTPATIENT
Start: 2023-10-23 | End: 2023-10-23

## 2023-10-23 RX ORDER — ONDANSETRON 2 MG/ML
4 INJECTION INTRAMUSCULAR; INTRAVENOUS EVERY 6 HOURS PRN
Status: DISCONTINUED | OUTPATIENT
Start: 2023-10-23 | End: 2023-10-25 | Stop reason: HOSPADM

## 2023-10-23 RX ORDER — ONDANSETRON 4 MG/1
4 TABLET, ORALLY DISINTEGRATING ORAL EVERY 6 HOURS PRN
Status: DISCONTINUED | OUTPATIENT
Start: 2023-10-23 | End: 2023-10-25 | Stop reason: HOSPADM

## 2023-10-23 RX ORDER — ACETAMINOPHEN 325 MG/1
650 TABLET ORAL EVERY 6 HOURS PRN
Status: DISCONTINUED | OUTPATIENT
Start: 2023-10-23 | End: 2023-10-25 | Stop reason: HOSPADM

## 2023-10-23 RX ORDER — HYDRALAZINE HYDROCHLORIDE 20 MG/ML
10 INJECTION INTRAMUSCULAR; INTRAVENOUS EVERY 6 HOURS PRN
Status: DISCONTINUED | OUTPATIENT
Start: 2023-10-23 | End: 2023-10-25 | Stop reason: HOSPADM

## 2023-10-23 RX ADMIN — GADOBUTROL 10 ML: 604.72 INJECTION INTRAVENOUS at 15:51

## 2023-10-23 RX ADMIN — SODIUM CHLORIDE 1000 MG: 9 INJECTION, SOLUTION INTRAVENOUS at 19:49

## 2023-10-23 ASSESSMENT — ACTIVITIES OF DAILY LIVING (ADL)
ADLS_ACUITY_SCORE: 35

## 2023-10-23 NOTE — ED NOTES
"EMERGENCY DEPARTMENT PROGRESS NOTE         ED COURSE AND MEDICAL DECISION MAKING  3:30 PM Patient was signed out to me by Dr. Dmitri Lee   5:30 PM Rechecked and updated patient.   5:45 PM Spoke with Neurology, Dr. Meadows  5:51 PM Rechecked and updated patient.  6:13 PM Spoke with the Hospitalist, Dr. Ramos, who accepts the patient for admission.    Barrington Cárdenas is a 42 year old male who presents right eye \"cloudy vision\" for the past 10 days. States he had sinus symptoms, treated for sinus infection, which resolved sinus pain, but still having blurry vision, so he went to [facility] today.  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 Pt denies HA or pain along right temporal scalp. No rash, no trauma. Denies numbness/weakness to one side. No contact lens or prescription eyeglass use.        At the time of signout, MRI imaging was pending.  His MRA and MRV returned normal.  MRI shows findings consistent with a demyelinating process such as MS.  His findings were discussed with neurology.  They agree that this most likely looks like MS and recommend high-dose IV therapy and inpatient management.  The patient is comfortable with this plan.          LAB  Pertinent labs results reviewed   Results for orders placed or performed during the hospital encounter of 10/23/23   CT Head w/o Contrast    Impression    IMPRESSION:  1.  Apparent asymmetric soft tissue hyperdensity within the right cavernous sinus that appears to extend into the right aspect of the sella also possibly inferiorly through the right foramen ovale. Recommend further evaluation with MRI brain/orbits   without and with IV contrast, as well as MRA head without contrast and MRV head without and with IV contrast.  2.  With this, there is apparent asymmetric thickening of the intraorbital segment of the right optic nerve and crowding of " the right orbital apex.  3.  Asymmetrically hyperdense intraorbital segment of the right ophthalmic artery, concerning for slow flow or occlusion, especially given clinical presentation.  4.  Multifocal moderate to severe paranasal sinus opacification with polypoid mucosal thickening noted in the bilateral nasal cavities.      Results discussed with Dmitri Lee on 10/23/2023 1:24 PM CDT.   MRA Brain ([place] [facility] [place]) wo Contrast    Impression    IMPRESSION:  1.  Normal MRA [place] [facility] [place].   MR Brain and Orbits w/o & w Contrast    Impression    IMPRESSION:  1.  Multiple T2 hyperintensities in predominantly periventricular and some in deep cortical white matter of both cerebral hemispheres.  2.  Imaging appearance is nonspecific however suspicious for demyelinating process such as multiple sclerosis; especially in view of right optic nerve enhancement (please see below).  3.  No acute infarct, mass, mass effect, or hemorrhage.     Orbit MRI:  1.  Asymmetric enhancement and enlargement of right optic nerve.  2.  Normal superior orbital veins bilaterally.  3.  Normal extraocular muscles.  4.  Findings most consistent with right optic nerve neuritis.     MRV Brain wo & w Contrast    Impression    IMPRESSION:  1.  No dural venous sinus thrombosis or significant stenosis.   Result Value Ref Range    CRP Inflammation 4.70 <5.00 mg/L   Erythrocyte sedimentation rate auto   Result Value Ref Range    Erythrocyte Sedimentation Rate 8 0 - 15 mm/hr   Basic metabolic panel   Result Value Ref Range    Sodium 138 135 - 145 mmol/L    Potassium 4.9 3.4 - 5.3 mmol/L    Chloride 105 98 - 107 mmol/L    Carbon Dioxide (CO2) 17 (L) 22 - 29 mmol/L    Anion Gap 16 (H) 7 - 15 mmol/L    Urea Nitrogen 13.4 6.0 - 20.0 mg/dL    Creatinine 0.86 0.67 - 1.17 mg/dL    GFR Estimate >90 >60 mL/min/1.73m2    Calcium 9.2 8.6 - 10.0 mg/dL    Glucose 101 (H) 70 - 99 mg/dL   CBC with platelets and differential   Result Value Ref Range    WBC Count 6.5  4.0 - 11.0 10e3/uL    RBC Count 5.38 4.40 - 5.90 10e6/uL    Hemoglobin 16.4 13.3 - 17.7 g/dL    Hematocrit 46.6 40.0 - 53.0 %    MCV 87 78 - 100 fL    MCH 30.5 26.5 - 33.0 pg    MCHC 35.2 31.5 - 36.5 g/dL    RDW 13.2 10.0 - 15.0 %    Platelet Count 212 150 - 450 10e3/uL    % Neutrophils 60 %    % Lymphocytes 26 %    % Monocytes 9 %    Mids % (Monos, Eos, Basos)      % Eosinophils 4 %    % Basophils 1 %    % Immature Granulocytes 0 %    NRBCs per 100 WBC 0 <1 /100    Absolute Neutrophils 3.9 1.6 - 8.3 10e3/uL    Absolute Lymphocytes 1.7 0.8 - 5.3 10e3/uL    Absolute Monocytes 0.6 0.0 - 1.3 10e3/uL    Mids Abs (Monos, Eos, Basos)      Absolute Eosinophils 0.2 0.0 - 0.7 10e3/uL    Absolute Basophils 0.1 0.0 - 0.2 10e3/uL    Absolute Immature Granulocytes 0.0 <=0.4 10e3/uL    Absolute NRBCs 0.0 10e3/uL   Result Value Ref Range    INR 1.01 0.85 - 1.15   Partial thromboplastin time   Result Value Ref Range    aPTT 34 22 - 38 Seconds         RADIOLOGY    Pertinent imaging reviewed   Please see official radiology report.    MRV Brain wo & w Contrast   Final Result   IMPRESSION:   1.  No dural venous sinus thrombosis or significant stenosis.      MR Brain and Orbits w/o & w Contrast   Final Result   IMPRESSION:   1.  Multiple T2 hyperintensities in predominantly periventricular and some in deep cortical white matter of both cerebral hemispheres.   2.  Imaging appearance is nonspecific however suspicious for demyelinating process such as multiple sclerosis; especially in view of right optic nerve enhancement (please see below).   3.  No acute infarct, mass, mass effect, or hemorrhage.       Orbit MRI:   1.  Asymmetric enhancement and enlargement of right optic nerve.   2.  Normal superior orbital veins bilaterally.   3.  Normal extraocular muscles.   4.  Findings most consistent with right optic nerve neuritis.         MRA Brain ([place] [facility] [place]) wo Contrast   Final Result   IMPRESSION:   1.  Normal MRA [place] [facility] [place].       CT Head w/o Contrast   Final Result   IMPRESSION:   1.  Apparent asymmetric soft tissue hyperdensity within the right cavernous sinus that appears to extend into the right aspect of the sella also possibly inferiorly through the right foramen ovale. Recommend further evaluation with MRI brain/orbits    without and with IV contrast, as well as MRA head without contrast and MRV head without and with IV contrast.   2.  With this, there is apparent asymmetric thickening of the intraorbital segment of the right optic nerve and crowding of the right orbital apex.   3.  Asymmetrically hyperdense intraorbital segment of the right ophthalmic artery, concerning for slow flow or occlusion, especially given clinical presentation.   4.  Multifocal moderate to severe paranasal sinus opacification with polypoid mucosal thickening noted in the bilateral nasal cavities.         Results discussed with Dmitri Lee on 10/23/2023 1:24 PM CDT.            FINAL IMPRESSION    1. Optic neuritis    2. Blurry vision, right eye                Dmitri Arthur MD  10/23/23 [id number]

## 2023-10-23 NOTE — ED TRIAGE NOTES
Pt has had right eye vision cloudiness for the last 10 days or so. He was treated for a sinus infection and yet the vision was not getting better so he went to [facility] eye clinic today.  The dr sent him here for a temporal artery ultrasound as the eye dr saw that pt has an inflamed optic nerve in his right eye.

## 2023-10-23 NOTE — PHARMACY-ADMISSION MEDICATION HISTORY
Pharmacist Admission Medication History    Admission medication history is complete. The information provided in this note is only as accurate as the sources available at the time of the update.    Information Source(s): Patient via in-person    Pertinent Information:     Changes made to PTA medication list:  Added: Augmentin  Deleted: None  Changed: None    Medication Affordability:  Not including over the counter (OTC) medications, was there a time in the past 3 months when you did not take your medications as prescribed because of cost?: No    Allergies reviewed with patient and updates made in EHR: yes    Medication History Completed By: Maribell Michaels RPH 10/23/2023 4:17 PM    PTA Med List   Medication Sig Last Dose    amoxicillin-clavulanate (AUGMENTIN) 875-125 MG tablet Take 1 tablet by mouth 2 times daily Started 10/17 x7 days for sinus infection

## 2023-10-23 NOTE — ED PROVIDER NOTES
"  Emergency Department Encounter     Evaluation Date & Time:   10/23/2023 10:39 AM    CHIEF COMPLAINT:  Cloudy Vision Right Eye      Triage Note:  Pt has had right eye vision cloudiness for the last 10 days or so. He was treated for a sinus infection and yet the vision was not getting better so he went to [facility] eye clinic today.  The dr sent him here for a temporal artery ultrasound as the eye dr saw that pt has an inflamed optic nerve in his right eye.                   ED COURSE & MEDICAL DECISION MAKING:     ED Course as of 10/23/23 1542   Mon Oct 23, 2023   1101 US called and they are unable to do temporal artery ultrasound as it's a specialized study we don't do here. Will cancel as a result.     1154 CRP and ESR negative, which essentially rules out GCA.   1308 I spoke with Dr. Neely at [facility] once again.  He will arrange for outpatient MRI brain study for pt.  Pt will be discharged.  GCA ruled out with no need to start steroids.   1325 Radiology spoke with me regarding abnormal CT findings, recommends MRI brain/orbits w/ and w/o, MRV w/ w/o, as well as MRA brain wo.     1443 [facility] updated regarding CT findings and need for more emergent MRI studies here.   1540 Pt signed out to Dr. Arthur pending MRI/MRV/MRA studies.  Anticipate likely discharge pending results.  Pt aware of current plan and over in MRI now.       Pt with \"cloudy vision\" to right eye only for the past 10 days, was associated with \"sinus pain\" congestion, thought he had a sinus infection and was seen for this, treated with antibiotics.  Sinus symptoms resolved, but vision changes remained, so he saw 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  I spoke with optometrist, Dr. Neely, who sent the patient in.  Concerned for GCA, so he recommends labs and if abnormal would need further testing, steroids.  If negative, could treat with " "aspirin. Requested I call him back after workup completed.  Pt otherwise neuro intact.  He does have dilated pupils from exam, so unable to do visual acuity in our ED.  No temporal pain/rash on my exam.  We are getting labs and CT brain imaging for now.    Medical Decision Making    History:  Supplemental history from: Documented in chart, if applicable  External Record(s) reviewed: Outpatient Record: [facility] 10/23/23    Work Up:  Chart documentation includes differential considered and any EKGs or imaging independently interpreted by provider, where specified.  In additional to work up documented, I considered the following work up: Documented in chart, if applicable.    External consultation:  Discussion of management with another provider: Ophthalmology    Complicating factors:  Care impacted by chronic illness: N/A  Care affected by social determinants of health: N/A    Disposition considerations: Admission considered. Patient was signed out to the oncoming physician, disposition pending.      At the conclusion of the encounter I discussed the results of all the tests and the disposition. The questions were answered. The patient or family acknowledged understanding and was agreeable with the care plan.      MEDICATIONS GIVEN IN THE EMERGENCY DEPARTMENT:  Medications - No data to display    NEW PRESCRIPTIONS STARTED AT TODAY'S ED VISIT:  New Prescriptions    No medications on file       HPI   HPI     Barrington Cárdenas is an otherwise healthy 42 year old male who presents to this ED via walk-in from [facility] for evaluation of right eye \"cloudy vision\" for the past 10 days. States he had sinus symptoms, treated for sinus infection, which resolved sinus pain, but still having blurry vision, so he went to [facility] today.  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  Pt denies HA, pain along right " "temporal scalp.  No rash, no trauma.  Denies numbness/weakness to one side.  No contact lens of prescription eyeglass use.      REVIEW OF SYSTEMS:  See HPI      Medical History     Past Medical History:   Diagnosis Date    Noyola's esophagus Janurary 2015       Past Surgical History:   Procedure Laterality Date    SURGICAL HISTORY OF -       Tonsils, age 3    TONSILLECTOMY         Family History   Family history unknown: Yes       Social History     Tobacco Use    Smoking status: Never    Smokeless tobacco: Former     Quit date: 8/10/2005   Vaping Use    Vaping Use: Never used   Substance Use Topics    Alcohol use: Yes     Comment: socially    Drug use: No       ciclopirox (LOPROX) 0.77 % cream        Physical Exam     Vitals:  /72   Pulse 69   Temp 98.1  F (36.7  C) (Oral)   Resp 16   Ht 1.88 m (6' 2\")   Wt 108.9 kg (240 lb)   SpO2 96%   BMI 30.81 kg/m      PHYSICAL EXAM:   Physical Exam  Vitals and nursing note reviewed.   Constitutional:       General: He is not in acute distress.     Appearance: Normal appearance.   HENT:      Head: Normocephalic and atraumatic.      Comments: No temporal mass/tenderness, no rash to scalp     Nose: Nose normal.      Mouth/Throat:      Mouth: Mucous membranes are moist.   Eyes:      Pupils: Pupils are equal, round, and reactive to light.   Neck:      Comments: No meningismus  Cardiovascular:      Rate and Rhythm: Normal rate and regular rhythm.      Pulses: Normal pulses.           Radial pulses are 2+ on the right side and 2+ on the left side.        Dorsalis pedis pulses are 2+ on the right side and 2+ on the left side.   Pulmonary:      Effort: Pulmonary effort is normal. No respiratory distress.      Breath sounds: Normal breath sounds.   Abdominal:      Palpations: Abdomen is soft.      Tenderness: There is no abdominal tenderness.   Musculoskeletal:      Cervical back: Full passive range of motion without pain and neck supple.      Comments: No calf tenderness " or swelling b/l   Skin:     General: Skin is warm.      Findings: No rash.   Neurological:      General: No focal deficit present.      Mental Status: He is alert. Mental status is at baseline.      GCS: GCS eye subscore is 4. GCS verbal subscore is 5. GCS motor subscore is 6.      Cranial Nerves: Cranial nerves 2-12 are intact.      Sensory: Sensation is intact.      Motor: Motor function is intact.      Coordination: Coordination is intact.      Gait: Gait is intact.      Comments: Fluent speech, no acute lateralizing deficits  Normal finger to nose b/l   Psychiatric:         Mood and Affect: Mood normal.         Behavior: Behavior normal.           Results     LAB:  All pertinent labs reviewed and interpreted  Labs Ordered and Resulted from Time of ED Arrival to Time of ED Departure   BASIC METABOLIC PANEL - Abnormal       Result Value    Sodium 138      Potassium 4.9      Chloride 105      Carbon Dioxide (CO2) 17 (*)     Anion Gap 16 (*)     Urea Nitrogen 13.4      Creatinine 0.86      GFR Estimate >90      Calcium 9.2      Glucose 101 (*)    CRP INFLAMMATION - Normal    CRP Inflammation 4.70     ERYTHROCYTE SEDIMENTATION RATE AUTO - Normal    Erythrocyte Sedimentation Rate 8     INR - Normal    INR 1.01     PARTIAL THROMBOPLASTIN TIME - Normal    aPTT 34     CBC WITH PLATELETS AND DIFFERENTIAL    WBC Count 6.5      RBC Count 5.38      Hemoglobin 16.4      Hematocrit 46.6      MCV 87      MCH 30.5      MCHC 35.2      RDW 13.2      Platelet Count 212      % Neutrophils 60      % Lymphocytes 26      % Monocytes 9      Mids % (Monos, Eos, Basos)        % Eosinophils 4      % Basophils 1      % Immature Granulocytes 0      NRBCs per 100 WBC 0      Absolute Neutrophils 3.9      Absolute Lymphocytes 1.7      Absolute Monocytes 0.6      Mids Abs (Monos, Eos, Basos)        Absolute Eosinophils 0.2      Absolute Basophils 0.1      Absolute Immature Granulocytes 0.0      Absolute NRBCs 0.0         RADIOLOGY:  CT Head w/o  Contrast   Final Result   IMPRESSION:   1.  Apparent asymmetric soft tissue hyperdensity within the right cavernous sinus that appears to extend into the right aspect of the sella also possibly inferiorly through the right foramen ovale. Recommend further evaluation with MRI brain/orbits    without and with IV contrast, as well as MRA head without contrast and MRV head without and with IV contrast.   2.  With this, there is apparent asymmetric thickening of the intraorbital segment of the right optic nerve and crowding of the right orbital apex.   3.  Asymmetrically hyperdense intraorbital segment of the right ophthalmic artery, concerning for slow flow or occlusion, especially given clinical presentation.   4.  Multifocal moderate to severe paranasal sinus opacification with polypoid mucosal thickening noted in the bilateral nasal cavities.         Results discussed with Dmitri Lee on 10/23/2023 1:24 PM CDT.      MRA Brain ([facility]) wo Contrast    (Results Pending)   MR Brain and Orbits w/o & w Contrast    (Results Pending)   MRV Brain wo & w Contrast    (Results Pending)                ECG:  none    PROCEDURES:  Procedures:  none      FINAL IMPRESSION:    ICD-10-CM    1. Optic neuritis  H46.9       2. Blurry vision, right eye  H53.8           0 minutes of critical care time        Dmitri Lee DO  Emergency Medicine  [facility] EMERGENCY DEPARTMENT  10/23/2023  10:40 AM          Dmitri Lee MD  10/23/23 1542

## 2023-10-24 ENCOUNTER — APPOINTMENT (OUTPATIENT)
Dept: MRI IMAGING | Facility: HOSPITAL | Age: 43
End: 2023-10-24
Attending: PSYCHIATRY & NEUROLOGY
Payer: COMMERCIAL

## 2023-10-24 LAB
ANION GAP SERPL CALCULATED.3IONS-SCNC: 10 MMOL/L (ref 7–15)
B BURGDOR IGG+IGM SER QL: 0.07
BUN SERPL-MCNC: 14.2 MG/DL (ref 6–20)
CALCIUM SERPL-MCNC: 9 MG/DL (ref 8.6–10)
CHLORIDE SERPL-SCNC: 106 MMOL/L (ref 98–107)
CREAT SERPL-MCNC: 0.74 MG/DL (ref 0.67–1.17)
DEPRECATED HCO3 PLAS-SCNC: 21 MMOL/L (ref 22–29)
EGFRCR SERPLBLD CKD-EPI 2021: >90 ML/MIN/1.73M2
ERYTHROCYTE [DISTWIDTH] IN BLOOD BY AUTOMATED COUNT: 13 % (ref 10–15)
GLUCOSE BLDC GLUCOMTR-MCNC: 145 MG/DL (ref 70–99)
GLUCOSE BLDC GLUCOMTR-MCNC: 172 MG/DL (ref 70–99)
GLUCOSE BLDC GLUCOMTR-MCNC: 173 MG/DL (ref 70–99)
GLUCOSE SERPL-MCNC: 171 MG/DL (ref 70–99)
HBV CORE AB SERPL QL IA: NONREACTIVE
HBV SURFACE AG SERPL QL IA: NONREACTIVE
HCT VFR BLD AUTO: 45.8 % (ref 40–53)
HGB BLD-MCNC: 16.1 G/DL (ref 13.3–17.7)
MCH RBC QN AUTO: 30.2 PG (ref 26.5–33)
MCHC RBC AUTO-ENTMCNC: 35.2 G/DL (ref 31.5–36.5)
MCV RBC AUTO: 86 FL (ref 78–100)
PLATELET # BLD AUTO: 230 10E3/UL (ref 150–450)
POTASSIUM SERPL-SCNC: 4.3 MMOL/L (ref 3.4–5.3)
RBC # BLD AUTO: 5.33 10E6/UL (ref 4.4–5.9)
SODIUM SERPL-SCNC: 137 MMOL/L (ref 135–145)
T PALLIDUM AB SER QL: NONREACTIVE
VIT B12 SERPL-MCNC: 1156 PG/ML (ref 232–1245)
VIT D+METAB SERPL-MCNC: 20 NG/ML (ref 20–50)
WBC # BLD AUTO: 9.2 10E3/UL (ref 4–11)

## 2023-10-24 PROCEDURE — 255N000002 HC RX 255 OP 636: Mod: JZ | Performed by: INTERNAL MEDICINE

## 2023-10-24 PROCEDURE — 36415 COLL VENOUS BLD VENIPUNCTURE: CPT | Performed by: PSYCHIATRY & NEUROLOGY

## 2023-10-24 PROCEDURE — 258N000003 HC RX IP 258 OP 636: Performed by: INTERNAL MEDICINE

## 2023-10-24 PROCEDURE — A9585 GADOBUTROL INJECTION: HCPCS | Mod: JZ | Performed by: INTERNAL MEDICINE

## 2023-10-24 PROCEDURE — 86235 NUCLEAR ANTIGEN ANTIBODY: CPT | Performed by: PSYCHIATRY & NEUROLOGY

## 2023-10-24 PROCEDURE — 87340 HEPATITIS B SURFACE AG IA: CPT | Performed by: PSYCHIATRY & NEUROLOGY

## 2023-10-24 PROCEDURE — 250N000011 HC RX IP 250 OP 636: Mod: JZ | Performed by: INTERNAL MEDICINE

## 2023-10-24 PROCEDURE — 82164 ANGIOTENSIN I ENZYME TEST: CPT | Performed by: PSYCHIATRY & NEUROLOGY

## 2023-10-24 PROCEDURE — 72156 MRI NECK SPINE W/O & W/DYE: CPT

## 2023-10-24 PROCEDURE — 80048 BASIC METABOLIC PNL TOTAL CA: CPT | Performed by: INTERNAL MEDICINE

## 2023-10-24 PROCEDURE — 99233 SBSQ HOSP IP/OBS HIGH 50: CPT | Performed by: INTERNAL MEDICINE

## 2023-10-24 PROCEDURE — 86618 LYME DISEASE ANTIBODY: CPT | Performed by: PSYCHIATRY & NEUROLOGY

## 2023-10-24 PROCEDURE — 85027 COMPLETE CBC AUTOMATED: CPT | Performed by: INTERNAL MEDICINE

## 2023-10-24 PROCEDURE — 82962 GLUCOSE BLOOD TEST: CPT

## 2023-10-24 PROCEDURE — 72157 MRI CHEST SPINE W/O & W/DYE: CPT

## 2023-10-24 PROCEDURE — 86481 TB AG RESPONSE T-CELL SUSP: CPT | Performed by: PSYCHIATRY & NEUROLOGY

## 2023-10-24 PROCEDURE — 36415 COLL VENOUS BLD VENIPUNCTURE: CPT | Performed by: INTERNAL MEDICINE

## 2023-10-24 PROCEDURE — 86704 HEP B CORE ANTIBODY TOTAL: CPT | Performed by: PSYCHIATRY & NEUROLOGY

## 2023-10-24 PROCEDURE — 120N000001 HC R&B MED SURG/OB

## 2023-10-24 PROCEDURE — 82306 VITAMIN D 25 HYDROXY: CPT | Performed by: PSYCHIATRY & NEUROLOGY

## 2023-10-24 PROCEDURE — 82607 VITAMIN B-12: CPT | Performed by: PSYCHIATRY & NEUROLOGY

## 2023-10-24 PROCEDURE — 99254 IP/OBS CNSLTJ NEW/EST MOD 60: CPT | Performed by: PSYCHIATRY & NEUROLOGY

## 2023-10-24 PROCEDURE — 86780 TREPONEMA PALLIDUM: CPT | Performed by: PSYCHIATRY & NEUROLOGY

## 2023-10-24 RX ORDER — VITAMIN B COMPLEX
50 TABLET ORAL DAILY
Status: DISCONTINUED | OUTPATIENT
Start: 2023-10-25 | End: 2023-10-25 | Stop reason: HOSPADM

## 2023-10-24 RX ORDER — GADOBUTROL 604.72 MG/ML
10 INJECTION INTRAVENOUS ONCE
Status: COMPLETED | OUTPATIENT
Start: 2023-10-24 | End: 2023-10-24

## 2023-10-24 RX ADMIN — SODIUM CHLORIDE 1000 MG: 9 INJECTION, SOLUTION INTRAVENOUS at 14:11

## 2023-10-24 RX ADMIN — GADOBUTROL 10 ML: 604.72 INJECTION INTRAVENOUS at 17:29

## 2023-10-24 ASSESSMENT — ACTIVITIES OF DAILY LIVING (ADL)
ADLS_ACUITY_SCORE: 35
DIFFICULTY_COMMUNICATING: NO
ADLS_ACUITY_SCORE: 35
CHANGE_IN_FUNCTIONAL_STATUS_SINCE_ONSET_OF_CURRENT_ILLNESS/INJURY: NO
DRESSING/BATHING_DIFFICULTY: NO
CONCENTRATING,_REMEMBERING_OR_MAKING_DECISIONS_DIFFICULTY: NO
FALL_HISTORY_WITHIN_LAST_SIX_MONTHS: NO
WALKING_OR_CLIMBING_STAIRS_DIFFICULTY: NO
DOING_ERRANDS_INDEPENDENTLY_DIFFICULTY: NO
HEARING_DIFFICULTY_OR_DEAF: NO
ADLS_ACUITY_SCORE: 35
ADLS_ACUITY_SCORE: 18
TOILETING_ISSUES: NO
DIFFICULTY_EATING/SWALLOWING: NO
ADLS_ACUITY_SCORE: 35
WEAR_GLASSES_OR_BLIND: NO

## 2023-10-24 NOTE — PROGRESS NOTES
"VSS throughout the shift.   Patient finished another dose of steroids.  BG check was 172 this evening.  Family here at bedside after patient returned from MRI.  Patient has no c/o pain.  Patient is Independent with ADLs and walking in room.  Patient reports his blurred vision to be \"getting better\".    "

## 2023-10-24 NOTE — CONSULTS
[facility]  Neurology Consultation    Patient Name:  Barrington Cárdenas  MRN:  [id number]    :  1980  Date of Service:  2023  Primary care provider:  Marv Thao      Neurology consultation service was asked to see Barrington Cárdenas by Dr. Ramos to evaluate optic neuritis.    Chief Complaint:  R eye vision loss    History of Present Illness:   Barrington Cárdenas is a 42 year old male with no significant past medical history who presents with R eye vision loss.  He states he has no medical issues but developed right eye pain and cloudiness of vision about 10 days ago.  Initially thought it was a sinus infection but did not improve with amoxicillin despite symptoms with sinus symptoms improving.   He knows colors are less vivid and there is some graying of his vision.  He thinks images are still relatively sharp.  He describes it as patchy in his right eye only.  Left eye vision normal.  There is pain with eye movements.   He denies double vision, weakness, numbness, chest pain, bowel or bladder dysfunction.    He states he has never had transient neurologic symptoms in the past to suggest demyelination such as above.   He went and saw an ophthalmologist yesterday who was concerned about optic neuritis and recommended an MRI.  MRI of the brain shows clear right optic nerve enhancement as well as multiple T2 hyperintensities in the cerebral cortex concerning for demyelination.    After his first dose of steroids he does feel his vision is slightly improved.  Still has definite asymmetry though.  He is adopted and does not know his family history.  He denies tobacco, alcohol, drug use.    ROS  A comprehensive ROS was performed and pertinent findings were included in HPI.     PMH  Past Medical History:   Diagnosis Date    Noyola's esophagus 2015     Past Surgical History:   Procedure Laterality Date    SURGICAL HISTORY OF -       Tonsils, age 3     "TONSILLECTOMY         Medications   I have personally reviewed the patient's medication list.     Allergies  I have personally reviewed the patient's allergy list.     Social History  See HPI    Family History    Does not know family history      Physical Examination   Vitals: /73   Pulse 80   Temp 97.4  F (36.3  C) (Oral)   Resp 18   Ht 1.88 m (6' 2\")   Wt 108.9 kg (240 lb)   SpO2 94%   BMI 30.81 kg/m    General: Standing on arrival, no acute distress  Head: NC/AT  Eyes: no icterus, op pink and moist  Cardiac:  Extremities warm, no edema.   Respiratory: non-labored on RA  Skin: No rash or lesion on exposed skin  Psych: Mood pleasant, affect congruent  Neuro:  Mental status: Awake, alert, attentive, oriented to self, time, place, and circumstance. Language is fluent and coherent with intact comprehension of complex commands, naming.    Cranial nerves: VFF, right APD.  Vision 20/30 right eye, 20/20 left eye.  Color vision reduced in the right eye.  Conjugate gaze, EOMI, facial sensation intact, face symmetric, shoulder shrug strong, tongue/uvula midline, no dysarthria.   Motor: Normal bulk and tone. No abnormal movements. 5/5 strength bilaterally in deltoids, biceps, triceps, hand , finger extensors hip flexors, hip extensors, knee flexion, knee extension, plantarflexion, dorsiflexion.   Reflexes: Normo-reflexic and symmetric biceps, brachioradialis, triceps, patellae, and achilles. Negative Herrera, no clonus, toes down-going.  Sensory: Intact to light touch, throughout upper and lower extremities.  No sensory extinction, negative Romberg coordination: FNF and HS without ataxia or dysmetria.   Gait: Normal width, stride length, turn, and arm swing. Station normal.tandem walk intact.    Investigations   I have personally reviewed pertinent labs, tests, and radiological imaging. Discussion of notable findings is included under Impression.     Reviewed brain MRI: Right optic nerve enhancement and " enlargement.  Multiple T2 hyperintensities with the appearance of demyelination.  Cortical lesions do not clearly enhance.   At least 1 T1 black on the right frontal region    Was patient transferred from outside hospital?   No    Impression  #R optic neuritis- threat to vision  #Multiple sclerosis- relapsing remitting    Mr. Cárdenas is a 42-year-old male admitted for right eye vision abnormalities.  MRI is consistent with optic neuritis, but also shows signs highly concerning for multiple sclerosis.  I had a very long discussion with the patient that I am highly suspicious for multiple sclerosis in this case.  With a new optic neuritis and evidence of a T1 black hole with at least some of his T2 burden consistent with MS I do suspect this is the case.  I explained that he needs spine imaging as part of baseline work-up.  He is interested in confirming the diagnosis and ruling out mimics.  I stated if spine shows subclinical plaques I think diagnosis is secured.  If spine MRI is normal a lumbar puncture can help exclude mimics although I still suspect it is multiple sclerosis.  He is in agreement with plan with proceeding with lumbar puncture if MRI spine is normal.  We will do serum work-up for mimics and refer to MS clinic.  He is interested in starting treatment sooner rather than later and would like to discuss at outpatient visit so we will send labs for safety of disease modifying therapies.   I did have a long discussion with him about what is known about etiology of multiple sclerosis and what he can expect going forward.        Recommendations  - MRI C spine and T spine  - If MRI spine negative LP with cell count, protein, glucose, oligoclonal bands  -Serum Lyme, vitamin B12, ACE, vitamin D treponema ordered  -Would replace vitamin D if low or low normal  -NATIVIDAD antibody index, hep B titers, and quant gold in preparation for DMT  -Referral to neurology placed in discharge navigator    Thank you for involving  Neurology in the care of Barrington Cárdenas.     Tere Meadows, DO      Medical Decision Making       90 MINUTES SPENT BY ME on the date of service doing chart review, history, exam, documentation & further activities per the note.

## 2023-10-24 NOTE — PROGRESS NOTES
"[facility]    Medicine Progress Note - Hospitalist Service    Date of Admission:  10/23/2023        Barrington Cárdensa is an otherwise healthy 42 year old male who presents to this ED via walk-in from [facility] for evaluation of right eye \"cloudy vision\" for the past 10 days. States he had sinus symptoms, treated for sinus infection, which resolved sinus pain, but still having blurry vision, so he went to [facility] today.  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  Unable to do an ultrasound therefore CT head was done which showed some abnormal findings.  This was confirmed confirmed with brain MRI showing evidence for demyelinating process possible MS.  Brain MRV and MRI unremarkable for stenosis.  He is admitted for neurology evaluation         10/24 :     Continues to have issues with vision loss  Received iv solumedrol  Neurology following  Plan for MRI cervical and thoracic spine w/contrast  May need spinal tap        Not medically ready for discharge at this time.              Assessment & Plan            Multiple Sclerosis, probable, still in process of doing work up for determining diagnosis.  Other condition (please specify)  Cloudy vision in the right eye, no recent trauma, brain MRI showing hyperintensities suspicious for multiple sclerosis.  ED spoke to neurology on-call who recommend admission and given dose of IV steroids     Recently treated for sinus infection, this is resolved, completed course of antibiotics, follow symptoms closely     Plan  Admit to medicine  Neurology consult placed  Patient received IV steroids single dose per neurology recommendation  Follow blood pressure and glucose while on steroids  As needed IV hydralazine started              Diet: Combination Diet Low Saturated Fat Na <2400mg Diet, No Caffeine Diet    DVT Prophylaxis: Pneumatic " "Compression Devices  Madrigal Catheter: Not present  Lines: None     Cardiac Monitoring: None  Code Status: Full Code      Clinically Significant Risk Factors                         # Obesity: Estimated body mass index is 30.81 kg/m  as calculated from the following:    Height as of this encounter: 1.88 m (6' 2\").    Weight as of this encounter: 108.9 kg (240 lb)., PRESENT ON ADMISSION            Disposition Plan     Expected Discharge Date: 10/25/2023                    Rich Lyon MD  Hospitalist Service  [facility]  Securely message with [employer] (more info)  Text page via [employer] Paging/Directory       Physical Exam   Vital Signs: Temp: 97.4  F (36.3  C) Temp src: Oral BP: 125/73 Pulse: 80   Resp: 18 SpO2: 94 % O2 Device: None (Room air)    Weight: 240 lbs 0 oz       GENERAL: The patient is not in any acute distressed. Awake and alert.  HEENT: Nonicteric sclerae, PERRLA, EOMI. Oropharynx clear. Moist mucous membranes. Conjunctivae appear well perfused.  HEART: Regular rate and rhythm without murmurs.  LUNGS: Clear to auscultation bilaterally. No wheezing or crackles.  ABDOMEN: Soft, positive bowel sounds, nontender.  SKIN: No rash, no excessive bruising, petechiae, or purpura.  EXTREMITIES : no rashes, no swelling in legs.  NEUROLOGIC: conscious and oriented, follows commands  ROS: All other systems negative       Medical Decision Making       60 MINUTES SPENT BY ME on the date of service doing chart review, history, exam, documentation & further activities per the note.  MANAGEMENT DISCUSSED with the following over the past 24 hours: rn, patient       Data     I have personally reviewed the following data over the past 24 hrs:    9.2  \   16.1   / 230     137 106 14.2 /  172 (H)   4.3 21 (L) 0.74 \       "

## 2023-10-24 NOTE — PROGRESS NOTES
Called by ED about this patient with suspect R optic neuritis.  MRI brain with some features suggestive of demyelinating disease.  If no contraindication recommend IV methylpred 1000mg with PPI.  Also needs MRI C and T spine.  Potentially LP pending spine MRI results.  Will plan on 3 days of steroids although some may be oral pending discussion with patient and spine MRI completion.   Recommend work up for atypical optic neuritis causes including lyme, b12, rpr, quant gold, ace in serum.  Will see patient in AM.        Tere Meadows DO

## 2023-10-24 NOTE — PROGRESS NOTES
NURSING PROGRESS NOTE  Shift Summary      Date: October 24, 2023   [id number]    Neuro/Musculoskeletal:  A&Ox4. Calm and pleasant towards writer and other staff. Receptive to all cares. Pt reports that his vision has slightly improved after infusion of Solumedrol.   Cardiac: On TELE monitoring: NSR. VSS. Denied and offered no c/o CP.  Respiratory:  LS: CT, absent of any adventitious sounds. SpO2: 97% on RA. Absent of SOB  GI/:  BS: normoactive x4Q. Voiding spontaneously without difficulty.   Diet/Appetite:  Tolerating a regular diet. Absent of nausea  Activity: Up ad melonie/ indep. Steady gait noted.   Pain:  Denied and offered no c/o pain. Absent of non-verbal indicators.   Skin:  No new deficits noted/ unremarkable.   LDAs + Drips/IVF:  PIV x1 to LUE: SL.   Protocols/Labs:  No electrolyte protocols.     Pertinent Shift Updates:  Pt received IV infusion of Solumedrol and tolerated it well. BG at bedtime: 151

## 2023-10-24 NOTE — PLAN OF CARE
"/73   Pulse 80   Temp 97.4  F (36.3  C) (Oral)   Resp 18   Ht 1.88 m (6' 2\")   Wt 108.9 kg (240 lb)   SpO2 94%   BMI 30.81 kg/m      Patient VSS throughout shift. Patient received another steroid dose this shift. Awaiting MRI at 1600. Patient A/Ox4, independent in room and calls appropriately. No reports of pain.     "

## 2023-10-24 NOTE — H&P
"Admission History and Physical   Barrington Cárdenas,    1980,   [id number]    [facility]   Blurry vision, right eye [H53.8]    PCP: Marv Thao,    Code status:  No Order       Extended Emergency Contact Information  Primary Emergency Contact: Jacqueline Cárdenas  Address: [address] [place] [address] [place]  Home Phone: [phone]  Work Phone: [phone]  Mobile Phone: [phone]  Relation: Spouse       Principal Problem:    Blurry vision, right eye  Active Problems:    Optic neuritis       ASSESSMENT AND PLAN:  Cloudy vision in the right eye, no recent trauma, brain MRI showing hyperintensities suspicious for multiple sclerosis.  ED spoke to neurology on-call who recommend admission and given dose of IV steroids    Recently treated for sinus infection, this is resolved, completed course of antibiotics, follow symptoms closely    Plan  Admit to medicine  Neurology consult placed  Patient received IV steroids single dose per neurology recommendation  Follow blood pressure and glucose while on steroids  As needed IV hydralazine started    Full code      DVT PPX:  Mechanical    Barriers to discharge    Anticipated Discharge date inpatient          Chief Complaint:  Blurry vision 10-day duration    HPI:  Barrington Cárdenas is an otherwise healthy 42 year old male who presents to this ED via walk-in from [facility] for evaluation of right eye \"cloudy vision\" for the past 10 days. States he had sinus symptoms, treated for sinus infection, which resolved sinus pain, but still having blurry vision, so he went to [facility] today.  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  Unable to do an ultrasound therefore CT head was done which showed some abnormal findings.  This was confirmed confirmed with brain MRI showing evidence for demyelinating process possible MS.  " "Brain MRV and MRI unremarkable for stenosis.  He is admitted for neurology evaluation      Medical History  Past Medical History:   Diagnosis Date    Noyola's esophagus Janurary 2015          Surgical History  He  has a past surgical history that includes surgical history of -  and Tonsillectomy.      SOCIAL HISTORY:  Social History     Socioeconomic History    Marital status:      Spouse name: Not on file    Number of children: Not on file    Years of education: Not on file    Highest education level: Not on file   Occupational History    Not on file   Tobacco Use    Smoking status: Never    Smokeless tobacco: Former     Quit date: 8/10/2005   Vaping Use    Vaping Use: Never used   Substance and Sexual Activity    Alcohol use: Yes     Comment: socially    Drug use: No    Sexual activity: Yes     Partners: Female     Birth control/protection: Pill   Other Topics Concern    Parent/sibling w/ CABG, MI or angioplasty before 65F 55M? Not Asked   Social History Narrative    Not on file     Social Determinants of Health     Financial Resource Strain: Not on file   Food Insecurity: Not on file   Transportation Needs: Not on file   Physical Activity: Not on file   Stress: Not on file   Social Connections: Not on file   Interpersonal Safety: Not on file   Housing Stability: Not on file       FAMILY HISTORY:  Family History   Family history unknown: Yes         ALLERGIES:  No Known Allergies    MEDICATIONS: Please see pharmacy note        ROS:  12 point review of systems reviewed and is negative except as stated above      PHYSICAL EXAM:  /72   Pulse 69   Temp 98.1  F (36.7  C) (Oral)   Resp 16   Ht 1.88 m (6' 2\")   Wt 108.9 kg (240 lb)   SpO2 96%   BMI 30.81 kg/m      General: Alert and oriented x 3. Not in obvious distress.  HEENT: Pupils equal and reactive,ENT WNL   Neck- supple, No JVP elevation, lymphadenopathy or thyromegaly. Trachea-central.  Chest: Clear to auscultation bilaterally.  Heart: S1S2 " regular. No M/R/G.  Abdomen: Soft. NT, ND. No organomegaly. Bowel sounds- active.  Back: No spine tenderness. No CVA tenderness.  Extremities: No leg swelling. Peripheral pulses 2+ bilaterally.  Neuro: No focal neurological deficit, no cerebellar signs, no nystagmus, mild motor deficits noted  Skin: no skin rashes     DIAGNOSTIC DATA:      EKG Results: Will be ordered and reviewed        Advanced Care Planning       Homero Ramos MD

## 2023-10-25 ENCOUNTER — APPOINTMENT (OUTPATIENT)
Dept: RADIOLOGY | Facility: HOSPITAL | Age: 43
End: 2023-10-25
Attending: PSYCHIATRY & NEUROLOGY
Payer: COMMERCIAL

## 2023-10-25 VITALS
SYSTOLIC BLOOD PRESSURE: 128 MMHG | RESPIRATION RATE: 18 BRPM | BODY MASS INDEX: 30.8 KG/M2 | TEMPERATURE: 97.9 F | HEIGHT: 74 IN | WEIGHT: 240 LBS | HEART RATE: 66 BPM | OXYGEN SATURATION: 94 % | DIASTOLIC BLOOD PRESSURE: 60 MMHG

## 2023-10-25 PROBLEM — G35 RELAPSING REMITTING MULTIPLE SCLEROSIS (H): Status: ACTIVE | Noted: 2023-10-25

## 2023-10-25 LAB
APPEARANCE CSF: CLEAR
COLOR CSF: COLORLESS
GAMMA INTERFERON BACKGROUND BLD IA-ACNC: 0 IU/ML
GLUCOSE BLDC GLUCOMTR-MCNC: 138 MG/DL (ref 70–99)
GLUCOSE BLDC GLUCOMTR-MCNC: 146 MG/DL (ref 70–99)
GLUCOSE CSF-MCNC: 94 MG/DL (ref 40–70)
M TB IFN-G BLD-IMP: ABNORMAL
M TB IFN-G CD4+ BCKGRND COR BLD-ACNC: 0.08 IU/ML
MITOGEN IGNF BCKGRD COR BLD-ACNC: 0 IU/ML
MITOGEN IGNF BCKGRD COR BLD-ACNC: 0 IU/ML
PROT CSF-MCNC: 30.6 MG/DL (ref 15–45)
QUANTIFERON MITOGEN: 0.08 IU/ML
QUANTIFERON NIL TUBE: 0 IU/ML
QUANTIFERON TB1 TUBE: 0 IU/ML
QUANTIFERON TB2 TUBE: 0
RBC # CSF MANUAL: 1 /UL (ref 0–2)
TUBE # CSF: 4
WBC # CSF MANUAL: 2 /UL (ref 0–5)

## 2023-10-25 PROCEDURE — 84999 UNLISTED CHEMISTRY PROCEDURE: CPT | Performed by: PSYCHIATRY & NEUROLOGY

## 2023-10-25 PROCEDURE — 009U3ZX DRAINAGE OF SPINAL CANAL, PERCUTANEOUS APPROACH, DIAGNOSTIC: ICD-10-PCS | Performed by: RADIOLOGY

## 2023-10-25 PROCEDURE — 86790 VIRUS ANTIBODY NOS: CPT | Performed by: PSYCHIATRY & NEUROLOGY

## 2023-10-25 PROCEDURE — 84157 ASSAY OF PROTEIN OTHER: CPT | Performed by: PSYCHIATRY & NEUROLOGY

## 2023-10-25 PROCEDURE — 82945 GLUCOSE OTHER FLUID: CPT | Performed by: PSYCHIATRY & NEUROLOGY

## 2023-10-25 PROCEDURE — 62328 DX LMBR SPI PNXR W/FLUOR/CT: CPT

## 2023-10-25 PROCEDURE — 87205 SMEAR GRAM STAIN: CPT | Performed by: PSYCHIATRY & NEUROLOGY

## 2023-10-25 PROCEDURE — 36415 COLL VENOUS BLD VENIPUNCTURE: CPT | Performed by: PSYCHIATRY & NEUROLOGY

## 2023-10-25 PROCEDURE — 99232 SBSQ HOSP IP/OBS MODERATE 35: CPT | Performed by: PSYCHIATRY & NEUROLOGY

## 2023-10-25 PROCEDURE — 82784 ASSAY IGA/IGD/IGG/IGM EACH: CPT | Performed by: PSYCHIATRY & NEUROLOGY

## 2023-10-25 PROCEDURE — 86053 AQAPRN-4 ANTB FLO CYTMTRY EA: CPT | Performed by: PSYCHIATRY & NEUROLOGY

## 2023-10-25 PROCEDURE — 99239 HOSP IP/OBS DSCHRG MGMT >30: CPT | Mod: GC | Performed by: INTERNAL MEDICINE

## 2023-10-25 PROCEDURE — 89050 BODY FLUID CELL COUNT: CPT | Performed by: PSYCHIATRY & NEUROLOGY

## 2023-10-25 PROCEDURE — 87070 CULTURE OTHR SPECIMN AEROBIC: CPT | Performed by: PSYCHIATRY & NEUROLOGY

## 2023-10-25 PROCEDURE — 250N000011 HC RX IP 250 OP 636: Mod: JZ | Performed by: PSYCHIATRY & NEUROLOGY

## 2023-10-25 PROCEDURE — 250N000013 HC RX MED GY IP 250 OP 250 PS 637: Performed by: PSYCHIATRY & NEUROLOGY

## 2023-10-25 PROCEDURE — 258N000003 HC RX IP 258 OP 636: Performed by: PSYCHIATRY & NEUROLOGY

## 2023-10-25 PROCEDURE — 86363 MOG-IGG1 ANTB FLO CYTMTRY EA: CPT | Performed by: PSYCHIATRY & NEUROLOGY

## 2023-10-25 RX ORDER — PREDNISONE 10 MG/1
TABLET ORAL
Qty: 35 TABLET | Refills: 0 | Status: SHIPPED | OUTPATIENT
Start: 2023-10-25 | End: 2023-11-14

## 2023-10-25 RX ORDER — PREDNISONE 10 MG/1
TABLET ORAL
Qty: 35 TABLET | Refills: 0 | Status: SHIPPED | OUTPATIENT
Start: 2023-10-25 | End: 2023-10-25

## 2023-10-25 RX ADMIN — SODIUM CHLORIDE 1000 MG: 9 INJECTION, SOLUTION INTRAVENOUS at 13:30

## 2023-10-25 RX ADMIN — Medication 50 MCG: at 09:47

## 2023-10-25 ASSESSMENT — ACTIVITIES OF DAILY LIVING (ADL)
ADLS_ACUITY_SCORE: 18

## 2023-10-25 NOTE — PLAN OF CARE
"  Problem: Adult Inpatient Plan of Care  Goal: Plan of Care Review  Description: The Plan of Care Review/Shift note should be completed every shift.  The Outcome Evaluation is a brief statement about your assessment that the patient is improving, declining, or no change.  This information will be displayed automatically on your shift  note.  Outcome: Progressing     Problem: Adult Inpatient Plan of Care  Goal: Patient-Specific Goal (Individualized)  Description: You can add care plan individualizations to a care plan. Examples of Individualization might be:  \"Parent requests to be called daily at 9am for status\", \"I have a hard time hearing out of my right ear\", or \"Do not touch me to wake me up as it startles  me\".  Outcome: Progressing     Problem: Adult Inpatient Plan of Care  Goal: Absence of Hospital-Acquired Illness or Injury  Intervention: Prevent Skin Injury  Recent Flowsheet Documentation  Taken 10/25/2023 1200 by Dmitri Gupta RN  Body Position: position changed independently   Goal Outcome Evaluation:  No verbal or nonverbal expressions of pain, Blotchy vision in right eye. Lumbar puncture at 1330                      "

## 2023-10-25 NOTE — PROCEDURES
Neurointerventional Surgery  Post-procedure     Patient Name: Barrington Cárdenas  MRN: [id number]  Date of Procedure: October 25, 2023    Procedure: Lumbar Puncture  Radiologist: Lucio Wong MD    Fluoro Time: 0.1 minutes  Air Kerma: 2.6 mGy    EBL: None   Complications: None    Patient reevaluated immediately prior to sedation and prior to procedure.    Preliminary Report:   (See dictation for full detail)  13 ml Clear CSF to lab    Assess/Plan:  Report to ordering    Lucio Wong MD MD  Emergency pager: [phone]  Office: [phone]

## 2023-10-25 NOTE — PROCEDURES
Neurointerventional Surgery  Post-procedure     Patient Name: Barrington Cárdenas  MRN: [id number]  Date of Procedure: October 25, 2023    Procedure: Lumbar Myelogram  Radiologist: Lucio Wong MD    Contrast: 14 ml Isovue 180  Fluoro Time: 0.1 minutes  Air Kerma: 34.5 mGy    EBL: 0 ml   Complications: None    Patient reevaluated immediately prior to sedation and prior to procedure.    Preliminary Report:   (See dictation for full detail)  See CT Myelogram report.    Assess/Plan:  Report to ordering    Lucio Wong MD MD  Emergency pager: [phone]  Office: [phone]

## 2023-10-25 NOTE — PROGRESS NOTES
Report given to Nadeen DIAZ on P4    Writer Received this pt at 1915. Pt A&O x4 BG check ac 172 and hs. Steady gait. Family at bedside, recently came back from MRI.

## 2023-10-25 NOTE — PROGRESS NOTES
NEUROLOGY INPATIENT PROGRESS NOTE       [facility] NEUROLOGY [place]  [address] [place] [address]  Tel: [phone]  Fax: [phone]  [email or web address]     ASSESSMENT & PLAN   Date: 10/25/2023  Hospital Day#: 2  Visit diagnosis: Multiple sclerosis    Likely multiple sclerosis  42-year-old male with no significant past medical history admitted with right visual loss.  MRI of the brain and optic nerve suggest multiple hyperintensities in predominantly periventricular and some and deep cortical white matter of both cerebral hemisphere suggesting a primary demyelinating disease.  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  Lumbar puncture pending  Check antinuclear antibody, SSA/SSB, MOG antibody, neuromyelitis optica, HTLV 1/2 antibody  Vitamin B12, RPR Lyme titer normal.  NATIVIDAD virus Doug TB Gold plus and hepatitis B core antibody pending  Continue IV methylprednisolone 1000 mcg daily x3 days followed by tapering dose of prednisone  If lumbar puncture confirms a diagnosis of multiple sclerosis will consider starting on Ocrevus    Neurology Discharge Planning :   RAYSA Meredith MD  [facility]  [facility], [facility]     PROBLEM LIST      Patient Active Problem List   Diagnosis Code    Obesity, unspecified classification, unspecified obesity type, unspecified whether serious comorbidity present E66.9    Optic neuritis H46.9    Blurry vision, right eye H53.8    Relapsing remitting multiple sclerosis (H) G35     Past Medical History:   Patient  has a past medical history of Noyola's esophagus (Janurary 2015).     SUBJECTIVE     Patient reports right visual blurring is improving.  Denies any ureteral orbital discomfort      OBJECTIVE     Vital signs in last 24 hours  Temp:  [97.6  F (36.4  C)] 97.6  F (36.4  C)  Pulse:  [80-88] 88  Resp:  [16-18] 16  BP: (128-134)/(76) 134/76  SpO2:  [91 %-96 %] 91 %    Review of Systems   Pertinent items are noted in HPI.    General Physical Exam: Patient is alert and oriented x 3. Vital signs were reviewed and are documented in EMR. Neck was supple, no Carotid bruit, thyromegaly, JVD or lymphadenopathy noted.  Neurological Exam:  Patient is alert and oriented x3 no acute distress color vision reduced on the right eye funduscopic exam shows blurred disc margins.  Rest of the cranial nerves are intact.  Motor strength 5/5 reflexes 2+ with downgoing toes.  Sensation intact light touch pinprick no dysmetria noted on finger-nose testing gait testing normal     DIAGNOSTIC STUDIES     Pertinent Radiology   Following imaging studies were reviewed:    MRI  1.  Multiple T2 hyperintensities in predominantly periventricular and some in deep cortical white matter of both cerebral hemispheres.  2.  Imaging appearance is nonspecific however suspicious for demyelinating process such as multiple sclerosis; especially in view of right optic nerve enhancement (please see below).  3.  No acute infarct, mass, mass effect, or hemorrhage.      Orbit MRI:  1.  Asymmetric enhancement and enlargement of right optic nerve.  2.  Normal superior orbital veins bilaterally.  3.  Normal extraocular muscles.  4.  Findings most consistent with right optic nerve neuritis.    CERVICAL SPINE  1.  Thin T2 signal abnormality within the central spinal cord at the level of C6-C7 measuring 2 mm. Additional small 1 mm T2 signal in the spinal cord at the C5 level. This has the appearance of syringohydromyelia as opposed to a demyelinating lesion,   however that is not entirely excluded.  2.  No abnormal enhancement in the cervical spinal cord.  3.  No significant degenerative changes in the cervical spine    THORACIC SPINE  1.  No abnormal  T2 signal or enhancement in the thoracic spinal cord.  2.  Degenerative disc changes in the midthoracic spine as detailed above without significant spinal canal or neural foraminal narrowing.    Pertinent Labs   Lab Results: Personally Reviewed  Recent Results (from the past 24 hour(s))   Lyme Disease Total Abs Bld with Reflex to Confirm CLIA    Collection Time: 10/24/23 11:31 AM   Result Value Ref Range    Lyme Disease Antibodies Total 0.07 <0.90   Treponema Abs w Reflex to RPR and Titer    Collection Time: 10/24/23 11:31 AM   Result Value Ref Range    Treponema Antibody Total Nonreactive Nonreactive   Hepatitis B surface antigen    Collection Time: 10/24/23 11:31 AM   Result Value Ref Range    Hepatitis B Surface Antigen Nonreactive Nonreactive   Hepatitis B core antibody    Collection Time: 10/24/23 11:31 AM   Result Value Ref Range    Hepatitis B Core Antibody Total Nonreactive Nonreactive   Glucose by meter    Collection Time: 10/24/23 12:11 PM   Result Value Ref Range    GLUCOSE BY METER POCT 145 (H) 70 - 99 mg/dL   Glucose by meter    Collection Time: 10/24/23  5:49 PM   Result Value Ref Range    GLUCOSE BY METER POCT 172 (H) 70 - 99 mg/dL         HOSPITAL MEDICATIONS      methylPREDNISolone  1,000 mg Intravenous Once    cholecalciferol  50 mcg Oral Daily        Total time spent for face to face visit, reviewing labs/imaging studies, counseling and coordination of care was: 45 Minutes More than 50% of this time was spent on counseling and coordination of care.      This note was dictated using voice recognition software.  Any grammatical or context distortions are unintentional and inherent to the software.

## 2023-10-25 NOTE — PLAN OF CARE
"  Problem: Adult Inpatient Plan of Care  Goal: Plan of Care Review  Description: The Plan of Care Review/Shift note should be completed every shift.  The Outcome Evaluation is a brief statement about your assessment that the patient is improving, declining, or no change.  This information will be displayed automatically on your shift  note.  Outcome: Progressing  Goal: Patient-Specific Goal (Individualized)  Description: You can add care plan individualizations to a care plan. Examples of Individualization might be:  \"Parent requests to be called daily at 9am for status\", \"I have a hard time hearing out of my right ear\", or \"Do not touch me to wake me up as it startles  me\".  Outcome: Progressing  Goal: Absence of Hospital-Acquired Illness or Injury  Outcome: Progressing  Intervention: Prevent Skin Injury  Recent Flowsheet Documentation  Taken 10/25/2023 0005 by Lilian Giles RN  Body Position: position changed independently  Goal: Optimal Comfort and Wellbeing  Outcome: Progressing  Goal: Readiness for Transition of Care  Outcome: Progressing   Goal Outcome Evaluation:       Pt denies pain, nausea, numbness, or dizziness. Pt reports blotchy vision in medial aspect of right eye that does not change. No other symptoms. Cares clustered to allow pt to sleep.                 "

## 2023-10-26 LAB
ACE SERPL-CCNC: 39 U/L
ENA SS-A AB SER IA-ACNC: 1 U/ML
ENA SS-A AB SER IA-ACNC: NEGATIVE
ENA SS-B IGG SER IA-ACNC: 0.7 U/ML
ENA SS-B IGG SER IA-ACNC: NEGATIVE
Lab: NORMAL
PERFORMING LABORATORY: NORMAL
SPECIMEN STATUS: NORMAL
TEST NAME: NORMAL

## 2023-10-27 LAB — HTLV I+II AB SER QL IA: NEGATIVE

## 2023-10-28 LAB
ALB CSF/SERPL: 6.7 RATIO
ALBUMIN CSF-MCNC: 29 MG/DL
ALBUMIN SERPL-MCNC: 4335 MG/DL
ATRIAL RATE - MUSE: 66 BPM
DIASTOLIC BLOOD PRESSURE - MUSE: NORMAL MMHG
IGG CSF-MCNC: 4.1 MG/DL
IGG SERPL-MCNC: 1036 MG/DL
IGG SYNTH RATE SER+CSF CALC-MRATE: 1.2 MG/D
IGG/ALB CLEAR SER+CSF-RTO: 0.59 RATIO
IGG/ALB CSF: 0.14 RATIO
INTERPRETATION ECG - MUSE: NORMAL
OLIGOCLONAL BANDS CSF ELPH-IMP: ABNORMAL
OLIGOCLONAL BANDS CSF ELPH-IMP: POSITIVE
OLIGOCLONAL BANDS CSF IEF: 7 BANDS
P AXIS - MUSE: 60 DEGREES
PR INTERVAL - MUSE: 170 MS
QRS DURATION - MUSE: 120 MS
QT - MUSE: 420 MS
QTC - MUSE: 440 MS
R AXIS - MUSE: -26 DEGREES
SYSTOLIC BLOOD PRESSURE - MUSE: NORMAL MMHG
T AXIS - MUSE: 30 DEGREES
VENTRICULAR RATE- MUSE: 66 BPM

## 2023-10-30 LAB
BACTERIA CSF CULT: NO GROWTH
GRAM STAIN RESULT: NORMAL
GRAM STAIN RESULT: NORMAL
MAYO MISC RESULT: NORMAL

## 2023-10-31 NOTE — TELEPHONE ENCOUNTER
Action 10/31/23 MV 12.49pm   Action Taken Imaging request faxed to [facility]     Action 11/8/23 MV 10.24am   Action Taken Images resolved in PACS       RECORDS RECEIVED FROM: internal   REASON FOR VISIT: Optic neuritis [H46.9]Multiple sclerosis    Date of Appt: 11/29/23   NOTES (FOR ALL VISITS) STATUS DETAILS   OFFICE NOTE from referring provider Internal SEE INPATIENT NOTES   DISCHARGE SUMMARY from hospital Internal [facility]:  10/23/23-10/25/2   MEDICATION LIST Internal    IMAGING  (FOR ALL VISITS)     LUMBAR PUNCTURE PACS [facility]:  10/25/23   MRI (HEAD, NECK, SPINE) PACS [facility]:  MRI Thoracic Spine 10/24/23  MRI Cervical Spine 10/24/23  MRV Brain 10/23/23  MRI Brain 10/23/23  MRA Brain COW 10/23/23   CT (HEAD, NECK, SPINE) PACS [facility]:  CT Head 10/23/23    [facility]:  CT Thoracic Spine 6/28/22  CT Cervical Spine 6/28/22

## 2023-11-01 ENCOUNTER — OFFICE VISIT (OUTPATIENT)
Dept: FAMILY MEDICINE | Facility: CLINIC | Age: 43
End: 2023-11-01
Payer: COMMERCIAL

## 2023-11-01 VITALS
WEIGHT: 242 LBS | TEMPERATURE: 96.7 F | SYSTOLIC BLOOD PRESSURE: 124 MMHG | DIASTOLIC BLOOD PRESSURE: 80 MMHG | BODY MASS INDEX: 32.07 KG/M2 | HEART RATE: 72 BPM | OXYGEN SATURATION: 95 % | HEIGHT: 73 IN

## 2023-11-01 DIAGNOSIS — Z13.1 SCREENING FOR DIABETES MELLITUS: ICD-10-CM

## 2023-11-01 DIAGNOSIS — Z13.6 CARDIOVASCULAR SCREENING; LDL GOAL LESS THAN 130: ICD-10-CM

## 2023-11-01 DIAGNOSIS — Z01.84 IMMUNITY STATUS TESTING: ICD-10-CM

## 2023-11-01 DIAGNOSIS — G35 RELAPSING REMITTING MULTIPLE SCLEROSIS (H): Primary | ICD-10-CM

## 2023-11-01 DIAGNOSIS — Z11.59 NEED FOR HEPATITIS C SCREENING TEST: ICD-10-CM

## 2023-11-01 PROBLEM — H53.8 BLURRY VISION, RIGHT EYE: Status: RESOLVED | Noted: 2023-10-23 | Resolved: 2023-11-01

## 2023-11-01 LAB
CHOLEST SERPL-MCNC: 149 MG/DL
HBA1C MFR BLD: 5.4 % (ref 0–5.6)
HBV SURFACE AB SERPL IA-ACNC: 1.15 M[IU]/ML
HBV SURFACE AB SERPL IA-ACNC: NONREACTIVE M[IU]/ML
HCV AB SERPL QL IA: NONREACTIVE
HDLC SERPL-MCNC: 50 MG/DL
LDLC SERPL CALC-MCNC: 78 MG/DL
NONHDLC SERPL-MCNC: 99 MG/DL
TRIGL SERPL-MCNC: 103 MG/DL

## 2023-11-01 PROCEDURE — 90480 ADMN SARSCOV2 VAC 1/ONLY CMP: CPT | Performed by: PHYSICIAN ASSISTANT

## 2023-11-01 PROCEDURE — 86706 HEP B SURFACE ANTIBODY: CPT | Performed by: PHYSICIAN ASSISTANT

## 2023-11-01 PROCEDURE — 91320 SARSCV2 VAC 30MCG TRS-SUC IM: CPT | Performed by: PHYSICIAN ASSISTANT

## 2023-11-01 PROCEDURE — 36415 COLL VENOUS BLD VENIPUNCTURE: CPT | Performed by: PHYSICIAN ASSISTANT

## 2023-11-01 PROCEDURE — 80061 LIPID PANEL: CPT | Performed by: PHYSICIAN ASSISTANT

## 2023-11-01 PROCEDURE — 86803 HEPATITIS C AB TEST: CPT | Performed by: PHYSICIAN ASSISTANT

## 2023-11-01 PROCEDURE — 99214 OFFICE O/P EST MOD 30 MIN: CPT | Mod: 25 | Performed by: PHYSICIAN ASSISTANT

## 2023-11-01 PROCEDURE — 83036 HEMOGLOBIN GLYCOSYLATED A1C: CPT | Performed by: PHYSICIAN ASSISTANT

## 2023-11-01 RX ORDER — LANOLIN ALCOHOL/MO/W.PET/CERES
1 CREAM (GRAM) TOPICAL 2 TIMES DAILY
COMMUNITY
End: 2024-01-09

## 2023-11-01 ASSESSMENT — PAIN SCALES - GENERAL: PAINLEVEL: MILD PAIN (2)

## 2023-11-01 NOTE — PATIENT INSTRUCTIONS
Continue to follow-up with Neurology.     No medication changes today. May need Hepatitis B vaccine. Will update based on lab results.

## 2023-11-01 NOTE — PROGRESS NOTES
"Assessment & Plan   Multiple sclerosis (H)  Newly diagnosed MS in ED on 10/23/2023-10/25/2023. Neuro appointment on 11/29/2023. Currently on steroid taper, has 4 days remaining of course. Vision symptoms are still present but improving. Patient has questions if low back pain can be due to LP that was done in ED or if posterior leg pain might be MS related. LE neuro exam performed and leg pain not consistent with MS, more likely musculoskeletal in etiology. Discussed indeterminate finding of TB Quantiferon and will need this rechecked.    CARDIOVASCULAR SCREENING; LDL GOAL LESS THAN 130  Routine cholesterol screening.   - Lipid panel reflex to direct LDL Fasting; Future    Screening for diabetes mellitus  Routine diabetes mellitus screening.   - Hemoglobin A1c; Future    Need for hepatitis C screening test  Routine one-time hepatitis C screening.   - Hepatitis C Screen Reflex to HCV RNA Quant and Genotype; Future    Immunity status testing  Hepatitis core antibody and surface antigen checked in ER, non-reactive. Will check hepatitis B titer to determine if hepatitis B vaccine recommended. Reviewed that patient is considered immunocompromised due to MS diagnosis and should get routine vaccines.   - Hepatitis B Surface Antibody; Future     MED REC REQUIRED  Post Medication Reconciliation Status:  Discharge medications reconciled, continue medications without change  BMI:   Estimated body mass index is 32.15 kg/m  as calculated from the following:    Height as of this encounter: 1.848 m (6' 0.75\").    Weight as of this encounter: 109.8 kg (242 lb).   Weight management plan: Discussed healthy diet and exercise guidelines    FUTURE APPOINTMENTS:       - Follow-up visit in 4 weeks with neurology. Can establish care here at [facility] or at [place] for PCP since it is closer to home.     This patient was seen by OSEI Travis-S3, in collaboration with SOCORRO GuevaraC.      Ten Perez, " "DWAYNE  [facility]    Florina Balderas is a 42 year old, presenting for the following health issues:  Hospital follow up  and Establish Care        11/1/2023     9:42 AM   Additional Questions   Roomed by Haven HANKS CMA   Accompanied by Self     HPI     Hospital Follow-up Visit:    Hospital/Nursing Home/IP Rehab Facility: [facility]  Date of Admission: 10/23/2023  Date of Discharge: 10/25/2023  Reason(s) for Admission: Blurred Vision, Multiple Sclerosis    Was your hospitalization related to COVID-19? No   Problems taking medications regularly:  None  Medication changes since discharge: None  Problems adhering to non-medication therapy:  None    Summary of hospitalization:  [facility] discharge summary reviewed  Diagnostic Tests/Treatments reviewed.  Follow up needed: none  Other Healthcare Providers Involved in Patient s Care:         Specialist appointment - Neurology 11/29/2023  Update since discharge: improved.       Plan of care communicated with patient           Review of Systems   Constitutional, HEENT, cardiovascular, pulmonary, gi and gu systems are negative, except as otherwise noted.      Objective    /80 (BP Location: Right arm, Patient Position: Sitting, Cuff Size: Adult Large)   Pulse 72   Temp (!) 96.7  F (35.9  C) (Tympanic)   Ht 1.848 m (6' 0.75\")   Wt 109.8 kg (242 lb)   SpO2 95%   BMI 32.15 kg/m    Body mass index is 32.15 kg/m .  Physical Exam   GENERAL: healthy, alert and no distress  NECK: no adenopathy, no asymmetry, masses, or scars and thyroid normal to palpation  RESP: lungs clear to auscultation - no rales, rhonchi or wheezes  CV: regular rate and rhythm, normal S1 S2, no S3 or S4, no murmur, click or rub, no peripheral edema and peripheral pulses strong  ABDOMEN: soft, nontender, no hepatosplenomegaly, no masses and bowel sounds normal  MS: no gross musculoskeletal defects noted, no edema  NEURO: Normal strength " and tone, mentation intact and speech normal. Lower extremities 5/5 strength and range of motion intact. Straight leg raise negative of left lower extremity. Patellar reflexes hyper-reflexive.     Results for orders placed or performed in visit on 11/01/23 (from the past 24 hour(s))   Hemoglobin A1c   Result Value Ref Range    Hemoglobin A1C 5.4 0.0 - 5.6 %     Physician Attestation   I, Ten Perez PA-C, was present with the medical/CADEN student who participated in the service and in the documentation of the note.  I have verified the history and personally performed the physical exam and medical decision making.  I agree with the assessment and plan of care as documented in the note.      Ten Perez PA-C

## 2023-11-02 ENCOUNTER — TRANSFERRED RECORDS (OUTPATIENT)
Dept: HEALTH INFORMATION MANAGEMENT | Facility: CLINIC | Age: 43
End: 2023-11-02
Payer: COMMERCIAL

## 2023-11-03 ENCOUNTER — DOCUMENTATION ONLY (OUTPATIENT)
Dept: NEUROLOGY | Facility: CLINIC | Age: 43
End: 2023-11-03
Payer: COMMERCIAL

## 2023-11-03 NOTE — PROGRESS NOTES
Visit report received from [place] [facility], report placed in Dr. Mchugh's folder for review and signature.   Robert St EMT 11/03/2023 10:02AM

## 2023-11-07 LAB — SCANNED LAB RESULT: ABNORMAL

## 2023-11-10 ENCOUNTER — IMMUNIZATION (OUTPATIENT)
Dept: FAMILY MEDICINE | Facility: CLINIC | Age: 43
End: 2023-11-10
Payer: COMMERCIAL

## 2023-11-10 PROCEDURE — 90686 IIV4 VACC NO PRSV 0.5 ML IM: CPT

## 2023-11-10 PROCEDURE — 90471 IMMUNIZATION ADMIN: CPT

## 2023-11-14 ENCOUNTER — OFFICE VISIT (OUTPATIENT)
Dept: NEUROLOGY | Facility: CLINIC | Age: 43
End: 2023-11-14
Attending: PSYCHIATRY & NEUROLOGY
Payer: COMMERCIAL

## 2023-11-14 ENCOUNTER — TELEPHONE (OUTPATIENT)
Dept: NEUROLOGY | Facility: CLINIC | Age: 43
End: 2023-11-14

## 2023-11-14 VITALS
BODY MASS INDEX: 31.11 KG/M2 | WEIGHT: 242.45 LBS | HEART RATE: 68 BPM | OXYGEN SATURATION: 99 % | SYSTOLIC BLOOD PRESSURE: 147 MMHG | DIASTOLIC BLOOD PRESSURE: 84 MMHG | HEIGHT: 74 IN

## 2023-11-14 DIAGNOSIS — H46.9 OPTIC NEURITIS: ICD-10-CM

## 2023-11-14 DIAGNOSIS — E55.9 VITAMIN D DEFICIENCY: ICD-10-CM

## 2023-11-14 DIAGNOSIS — Z51.81 THERAPEUTIC DRUG MONITORING: Primary | ICD-10-CM

## 2023-11-14 DIAGNOSIS — G35 MULTIPLE SCLEROSIS (H): Primary | ICD-10-CM

## 2023-11-14 PROCEDURE — 99214 OFFICE O/P EST MOD 30 MIN: CPT | Performed by: PSYCHIATRY & NEUROLOGY

## 2023-11-14 PROCEDURE — 99205 OFFICE O/P NEW HI 60 MIN: CPT | Performed by: PSYCHIATRY & NEUROLOGY

## 2023-11-14 RX ORDER — METHYLPREDNISOLONE SODIUM SUCCINATE 125 MG/2ML
125 INJECTION, POWDER, LYOPHILIZED, FOR SOLUTION INTRAMUSCULAR; INTRAVENOUS
Status: CANCELLED
Start: 2023-11-14

## 2023-11-14 RX ORDER — HEPARIN SODIUM (PORCINE) LOCK FLUSH IV SOLN 100 UNIT/ML 100 UNIT/ML
5 SOLUTION INTRAVENOUS
Status: CANCELLED | OUTPATIENT
Start: 2023-11-14

## 2023-11-14 RX ORDER — ALBUTEROL SULFATE 0.83 MG/ML
2.5 SOLUTION RESPIRATORY (INHALATION)
Status: CANCELLED | OUTPATIENT
Start: 2023-11-14

## 2023-11-14 RX ORDER — HEPARIN SODIUM,PORCINE 10 UNIT/ML
5-20 VIAL (ML) INTRAVENOUS DAILY PRN
Status: CANCELLED | OUTPATIENT
Start: 2023-11-14

## 2023-11-14 RX ORDER — MEPERIDINE HYDROCHLORIDE 25 MG/ML
25 INJECTION INTRAMUSCULAR; INTRAVENOUS; SUBCUTANEOUS EVERY 30 MIN PRN
Status: CANCELLED | OUTPATIENT
Start: 2023-11-14

## 2023-11-14 RX ORDER — EPINEPHRINE 1 MG/ML
0.3 INJECTION, SOLUTION, CONCENTRATE INTRAVENOUS EVERY 5 MIN PRN
Status: CANCELLED | OUTPATIENT
Start: 2023-11-14

## 2023-11-14 RX ORDER — DIPHENHYDRAMINE HYDROCHLORIDE 50 MG/ML
50 INJECTION INTRAMUSCULAR; INTRAVENOUS
Status: CANCELLED
Start: 2023-11-14

## 2023-11-14 RX ORDER — DIPHENHYDRAMINE HCL 25 MG
50 CAPSULE ORAL ONCE
Status: CANCELLED | OUTPATIENT
Start: 2023-11-14

## 2023-11-14 RX ORDER — ACETAMINOPHEN 325 MG/1
650 TABLET ORAL ONCE
Status: CANCELLED | OUTPATIENT
Start: 2023-11-14

## 2023-11-14 RX ORDER — ALBUTEROL SULFATE 90 UG/1
1-2 AEROSOL, METERED RESPIRATORY (INHALATION)
Status: CANCELLED
Start: 2023-11-14

## 2023-11-14 RX ORDER — METHYLPREDNISOLONE SODIUM SUCCINATE 125 MG/2ML
125 INJECTION, POWDER, LYOPHILIZED, FOR SOLUTION INTRAMUSCULAR; INTRAVENOUS ONCE
Status: CANCELLED | OUTPATIENT
Start: 2023-11-14

## 2023-11-14 ASSESSMENT — PAIN SCALES - GENERAL: PAINLEVEL: NO PAIN (0)

## 2023-11-14 NOTE — PROGRESS NOTES
"Date of Service: 11/14/2023    [facility] Neurology   MS Clinic Evaluation    Subjective: 43-year-old otherwise healthy man who presents for evaluation of multiple sclerosis.    Disease onset in all October when he had decreased vision in the right eye.  This manifested with blotches in the vision from the eye.  It would appear as though he was looking through water with oil.  He sought evaluation and was diagnosed with optic neuritis.  He was given high-dose steroids.  With the steroids his vision has improved.  He estimates 60 to 75% improvement, but does have some residual darkness in vision from the right eye.  He has some slight pain with movement to the right behind the right eye.    He denies any past symptoms suggestive of an event of optic neuritis, brainstem syndrome, or myelitis aside from his presenting symptoms.    There is no known family history of multiple sclerosis, though he is adopted.    He is a non-smoker.    He works as an environmental health and .  He has not observed any cognitive changes or significant fatigue, though does note that he has been sweating more and energy/mood is a bit down since he has completed the steroid taper.        No Known Allergies    Current Outpatient Medications   Medication    calcium citrate-vitamin D (CITRACAL) 315-5 MG-MCG TABS per tablet    vitamin D3 (CHOLECALCIFEROL) 1.25 MG (23013 UT) capsule     No current facility-administered medications for this visit.        Past medical, surgical, social and family history was personally reviewed. Pertinent details noted above.     Physical Examination:   BP (!) 147/84 (BP Location: Right arm, Patient Position: Sitting, Cuff Size: Adult Regular)   Pulse 68   Ht 1.874 m (6' 1.78\")   Wt 110 kg (242 lb 7.2 oz)   SpO2 99%   BMI 31.31 kg/m      General: no acute distress  Cranial nerves:   VFFC  PERRL w/no RAPD  Ishihara 8/8 ou  EOM full w/no LELIA   Face symmetric  Hearing intact  No dysarthria   Motor: "   Tone is normal   Bulk is normal     R L  Deltoid  5 5  Biceps  5 5  Triceps 5 5  Wrist ext 5 5  Finger ext 5 5  Finger abd 5 5    Hip flexion 5 5  Knee flexion 5 5  Knee ext 5 5  Ankle d/f 5 5    Reflexes: 2+ and symmetric throughout, babinski absent bilaterally  Sensory: vibration is mild R , mod L reduced in the toes, JPS normal in the toes   Romberg is absent  Coordination: no ataxia or dysmetria  Gait: normal base and stride, tandem gait is intact, able to balance on one foot and hop x 5 bilaterally, able to get up from chair with single leg    Tests/Imaging:   CSF 7 ocb     Aqp4, mog neg    B12 1156  Vitamin D 20  JCV Ab 0.21      MRI Brain  10/2023 - moderate to severe lesion burden with mix of periventricular lesions, deep white matter and juxtacortical lesions, R ON gd+    MRI Cervical spine   10/2023 - 1-2 small lesions, gd-     MRI Thoracic spine   10/2023 - no definite lesions     Assessment: 43-year-old man who presents with an episode of right optic neuritis in the setting of MRI brain with multifocal demyelinating lesions and CSF positive for oligoclonal bands.  Constellation of findings is consistent with relapsing remitting multiple sclerosis.  At baseline he has a high lesion burden within his brain.  It is appropriate for him to be placed on high efficacy therapy even though he is treatment naïve.  He is at high risk for suffering progressive disability if he does not start on a highly effective therapy from the beginning.    We reviewed the risks and benefits of ocrelizumab in detail.  After discussion he was agreeable with proceeding with this medication.    He is noted to be vitamin D insufficient.  I have therefore recommended high-dose replacement.    We discussed the importance of remaining physically active and dietary changes that can be helpful for multiple sclerosis.    Plan:   -Start Ocrevus  - Vitamin D is limitation  - Follow-up in 3 months, plan for MRI in 1 year    Note was  completed with the assistance of Dragon Fluency software which can often result in accidental word substitutions.     A total of 60 minutes on the date of service were spent in the care of this patient.   Jael Mchugh MD on 11/14/2023 at 11:23 AM

## 2023-11-14 NOTE — LETTER
11/14/2023       RE: Barrington Cárdenas  [birth year][address] [facility] [place] [address]       Dear Colleague,    Thank you for referring your patient, Barrington Cárdenas, to the [facility] MULTIPLE SCLEROSIS CLINIC [place] at [facility]. Please see a copy of my visit note below.    Date of Service: 11/14/2023    [facility] Neurology   MS Clinic Evaluation    Subjective: 43-year-old otherwise healthy man who presents for evaluation of multiple sclerosis.    Disease onset in all October when he had decreased vision in the right eye.  This manifested with blotches in the vision from the eye.  It would appear as though he was looking through water with oil.  He sought evaluation and was diagnosed with optic neuritis.  He was given high-dose steroids.  With the steroids his vision has improved.  He estimates 60 to 75% improvement, but does have some residual darkness in vision from the right eye.  He has some slight pain with movement to the right behind the right eye.    He denies any past symptoms suggestive of an event of optic neuritis, brainstem syndrome, or myelitis aside from his presenting symptoms.    There is no known family history of multiple sclerosis, though he is adopted.    He is a non-smoker.    He works as an environmental health and .  He has not observed any cognitive changes or significant fatigue, though does note that he has been sweating more and energy/mood is a bit down since he has completed the steroid taper.        No Known Allergies    Current Outpatient Medications   Medication    calcium citrate-vitamin D (CITRACAL) 315-5 MG-MCG TABS per tablet    vitamin D3 (CHOLECALCIFEROL) 1.25 MG (68893 UT) capsule     No current facility-administered medications for this visit.        Past medical, surgical, social and family history was personally reviewed. Pertinent details noted above.     Physical Examination:   BP (!) 147/84 (BP Location:  "Right arm, Patient Position: Sitting, Cuff Size: Adult Regular)   Pulse 68   Ht 1.874 m (6' 1.78\")   Wt 110 kg (242 lb 7.2 oz)   SpO2 99%   BMI 31.31 kg/m      General: no acute distress  Cranial nerves:   VFFC  PERRL w/no RAPD  Ishihara 8/8 ou  EOM full w/no LELIA   Face symmetric  Hearing intact  No dysarthria   Motor:   Tone is normal   Bulk is normal     R L  Deltoid  5 5  Biceps  5 5  Triceps 5 5  Wrist ext 5 5  Finger ext 5 5  Finger abd 5 5    Hip flexion 5 5  Knee flexion 5 5  Knee ext 5 5  Ankle d/f 5 5    Reflexes: 2+ and symmetric throughout, babinski absent bilaterally  Sensory: vibration is mild R , mod L reduced in the toes, JPS normal in the toes   Romberg is absent  Coordination: no ataxia or dysmetria  Gait: normal base and stride, tandem gait is intact, able to balance on one foot and hop x 5 bilaterally, able to get up from chair with single leg    Tests/Imaging:   CSF 7 ocb     Aqp4, mog neg    B12 1156  Vitamin D 20  JCV Ab 0.21      MRI Brain  10/2023 - moderate to severe lesion burden with mix of periventricular lesions, deep white matter and juxtacortical lesions, R ON gd+    MRI Cervical spine   10/2023 - 1-2 small lesions, gd-     MRI Thoracic spine   10/2023 - no definite lesions     Assessment: 43-year-old man who presents with an episode of right optic neuritis in the setting of MRI brain with multifocal demyelinating lesions and CSF positive for oligoclonal bands.  Constellation of findings is consistent with relapsing remitting multiple sclerosis.  At baseline he has a high lesion burden within his brain.  It is appropriate for him to be placed on high efficacy therapy even though he is treatment naïve.  He is at high risk for suffering progressive disability if he does not start on a highly effective therapy from the beginning.    We reviewed the risks and benefits of ocrelizumab in detail.  After discussion he was agreeable with proceeding with this medication.    He is noted to " be vitamin D insufficient.  I have therefore recommended high-dose replacement.    We discussed the importance of remaining physically active and dietary changes that can be helpful for multiple sclerosis.    Plan:   -Start Ocrevus  - Vitamin D is limitation  - Follow-up in 3 months, plan for MRI in 1 year    Note was completed with the assistance of Dragon Fluency software which can often result in accidental word substitutions.     A total of 60 minutes on the date of service were spent in the care of this patient.   Jael Mchugh MD on 11/14/2023 at 11:23 AM        Again, thank you for allowing me to participate in the care of your patient.      Sincerely,    Jael Mchugh MD

## 2023-11-14 NOTE — TELEPHONE ENCOUNTER
Pt had clinic visit with Dr Mchugh and decision was made to start Ocrevus infusions. Therapy plan entered by Dr Mchugh. Plan to infuse at [facility] - currently scheduled for 12/8 and 12/22. Discussed insurance approval process with pt and recommendation to view first infusion date as tentative, as the infusion may need to be rescheduled if approval is not secured in time for infusion. Ocrevus start form faxed to [employer]. Staff message sent to infusion finance.    Heide Beck RN

## 2023-11-14 NOTE — NURSING NOTE
Chief Complaint   Patient presents with    MS    New Patient     Establishing MS care      Vitals were taken and medications were reconciled.   Robert St, EMT  11:26 AM

## 2023-11-14 NOTE — PATIENT INSTRUCTIONS
You have relapsing remitting MS     I recommend you start ocrevus - this is reasonable based on your MRI findings     Take high dose vitamin D (sent to your pharmacy)     Follow up in 3 months       Please monitor for the following symptoms:   - decreased vision from one eye, often associated with pain behind the eye  - double vision, often associated with walking difficulty  - numbness/tingling in an arm or leg that progresses over a few days  - weakness in an arm or leg    Symptoms of an MS relapse often progress over several hours or days.  People commonly experience intermittent numbness/tingling.  Only symptoms lasting more than 24 hours are concerning for a new relapse.

## 2023-11-15 ENCOUNTER — DOCUMENTATION ONLY (OUTPATIENT)
Dept: NEUROLOGY | Facility: CLINIC | Age: 43
End: 2023-11-15
Payer: COMMERCIAL

## 2023-11-15 NOTE — DISCHARGE SUMMARY
"[facility]  Hospitalist Discharge Summary      Date of Admission:  10/23/2023  Date of Discharge:  10/25/2023  7:37 PM  Discharging Provider: Rich Lyon MD  Discharge Service: Hospitalist Service    Discharge Diagnoses           Barrington Cárdenas is an otherwise healthy 42 year old male who presents to this ED via walk-in from [facility] for evaluation of right eye \"cloudy vision\" for the past 10 days. States he had sinus symptoms, treated for sinus infection, which resolved sinus pain, but still having blurry vision, so he went to [facility] today.  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  Unable to do an ultrasound therefore CT head was done which showed some abnormal findings.  This was confirmed confirmed with brain MRI showing evidence for demyelinating process possible MS.  Brain MRV and MRI unremarkable for stenosis.  He is admitted for neurology evaluation           10/25 :        Continues to have issues with Rt vision loss  But otherwise medically stable  Discussed with neurology and plan to discharge patient and outpatient neurology follow up.              Assessment & Plan     Multiple Sclerosis, likely    42-year-old male with no significant past medical history admitted with right visual loss.  MRI of the brain and optic nerve suggest multiple hyperintensities in predominantly periventricular and some and deep cortical white matter of both cerebral hemisphere suggesting a primary demyelinating disease.  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  Lumbar puncture done and results pending  Check antinuclear antibody, SSA/SSB, MOG antibody, neuromyelitis " "optica, HTLV 1/2 antibody  Vitamin B12, RPR Lyme titer normal.  NATIVIDAD virus Doug TB Gold plus and hepatitis B core antibody pending  Continue IV methylprednisolone 1000 mcg daily x3 days followed by tapering dose of prednisone  If lumbar puncture confirms a diagnosis of multiple sclerosis will consider starting on Ocrevus    Clinically Significant Risk Factors     # Obesity: Estimated body mass index is 30.81 kg/m  as calculated from the following:    Height as of this encounter: 1.88 m (6' 2\").    Weight as of this encounter: 108.9 kg (240 lb).       Follow-ups Needed After Discharge   Follow-up Appointments     Follow-up and recommended labs and tests       Follow up with primary care provider, Marv Thao, within 7 days for   hospital follow- up.  No follow up labs or test are needed.    Follow up with Neurology       As advised        {Additional follow-up instructions/to-do's for PCP    : none    Unresulted Labs Ordered in the Past 30 Days of this Admission       No orders found from 9/23/2023 to 10/24/2023.        These results will be followed up by PCP    Discharge Disposition   Discharged to home  Condition at discharge: Stable        Consultations This Hospital Stay   NEUROLOGY IP CONSULT    Code Status   Prior    Time Spent on this Encounter   I, Rich Lyon MD, personally saw the patient today and spent greater than 30 minutes discharging this patient.       Rich Lyon MD  [place] [id number]  Phone: [phone]  Fax: [phone]  ______________________________________________________________________    Physical Exam   Vital Signs:                    Weight: 240 lbs 0 oz         GENERAL: The patient is not in any acute distressed. Awake and alert.  HEENT: Nonicteric sclerae, PERRLA, EOMI. Oropharynx clear. Moist mucous membranes. Conjunctivae appear well perfused.  HEART: Regular rate and rhythm without murmurs.  LUNGS: Clear to " auscultation bilaterally. No wheezing or crackles.  ABDOMEN: Soft, positive bowel sounds, nontender.  SKIN: No rash, no excessive bruising, petechiae, or purpura.  EXTREMITIES : no rashes, no swelling in legs.  NEUROLOGIC: conscious and oriented, follows commands, visual deficit present on Rt. eye  ROS: All other systems negative          Primary Care Physician   Marv Thao    Discharge Orders      Adult Neurology  Referral      Reason for your hospital stay    Vision changes     Follow-up and recommended labs and tests     Follow up with primary care provider, Marv Thao, within 7 days for hospital follow- up.  No follow up labs or test are needed.    Follow up with Neurology       As advised     Activity    Your activity upon discharge: activity as tolerated     Diet    Follow this diet upon discharge: Orders Placed This Encounter      Advance Diet as Tolerated: Clear Liquid Diet       Significant Results and Procedures   Most Recent 3 CBC's:  Recent Labs   Lab Test 10/24/23  0721 10/23/23  1032   WBC 9.2 6.5   HGB 16.1 16.4   MCV 86 87    212     Most Recent 3 BMP's:  Recent Labs   Lab Test 10/25/23  1214 10/25/23  0824 10/24/23  1749 10/24/23  0751 10/24/23  0721 10/23/23  2208 10/23/23  1032 01/07/19  0725   0000   NA  --   --   --   --  137  --  138 137  --    POTASSIUM  --   --   --   --  4.3  --  4.9 3.6  --    CHLORIDE  --   --   --   --  106  --  105 104  --    CO2  --   --   --   --  21*  --  17* 24  --    BUN  --   --   --   --  14.2  --  13.4 12  --    CR  --   --   --   --  0.74  --  0.86 0.97  --    ANIONGAP  --   --   --   --  10  --  16* 9  --    MISA  --   --   --   --  9.0  --  9.2 8.7  --    * 146* 172*   < > 171*   < > 101* 101*   < >    < > = values in this interval not displayed.     Most Recent 2 LFT's:  Recent Labs   Lab Test 01/07/19  0725   AST 17   ALT 46   ALKPHOS 46   BILITOTAL 1.2     Most Recent 3 INR's:  Recent Labs   Lab Test 10/23/23  1349   INR 1.01        Discharge Medications   Discharge Medication List as of 10/25/2023  7:08 PM        CONTINUE these medications which have CHANGED    Details   predniSONE (DELTASONE) 10 MG tablet Take prednisone, 10 mg tab - 5 tabs daily for 2 days, then 4 tabs daily for 2 days, then 3 tabs  daily for 2 days, then 2 tab daily for 2 days, then 1 tab daily for 2 days, then stop., Disp-35 tablet, R-0, Local Print           STOP taking these medications       amoxicillin-clavulanate (AUGMENTIN) 875-125 MG tablet Comments:   Reason for Stopping:             Allergies   No Known Allergies

## 2023-11-15 NOTE — PROGRESS NOTES
Ocrevus access solution notice has been received, patient's request is currently in process.   [id number]  Robert St EMT 11/15/2023 8:35AM

## 2023-11-16 NOTE — TELEPHONE ENCOUNTER
Ok to infuse   Immunoglobulins can be collected on day of infusion   Jael Mchugh MD on 11/16/2023 at 4:37 PM

## 2023-11-29 ENCOUNTER — PRE VISIT (OUTPATIENT)
Dept: NEUROLOGY | Facility: CLINIC | Age: 43
End: 2023-11-29
Payer: COMMERCIAL

## 2023-12-05 ENCOUNTER — DOCUMENTATION ONLY (OUTPATIENT)
Dept: NEUROLOGY | Facility: CLINIC | Age: 43
End: 2023-12-05
Payer: COMMERCIAL

## 2023-12-05 NOTE — PROGRESS NOTES
Approval for ocrevus has been received from [facility], approval valid from 12/04/2023 through 01/18/2024.  Robert St EMT 12/05/2023 12:51PM

## 2023-12-08 ENCOUNTER — INFUSION THERAPY VISIT (OUTPATIENT)
Dept: INFUSION THERAPY | Facility: HOSPITAL | Age: 43
End: 2023-12-08
Payer: COMMERCIAL

## 2023-12-08 VITALS
OXYGEN SATURATION: 98 % | RESPIRATION RATE: 16 BRPM | DIASTOLIC BLOOD PRESSURE: 65 MMHG | HEART RATE: 78 BPM | SYSTOLIC BLOOD PRESSURE: 119 MMHG | TEMPERATURE: 98.5 F

## 2023-12-08 DIAGNOSIS — G35 MULTIPLE SCLEROSIS (H): Primary | ICD-10-CM

## 2023-12-08 DIAGNOSIS — Z51.81 THERAPEUTIC DRUG MONITORING: ICD-10-CM

## 2023-12-08 PROCEDURE — 82784 ASSAY IGA/IGD/IGG/IGM EACH: CPT

## 2023-12-08 PROCEDURE — 96375 TX/PRO/DX INJ NEW DRUG ADDON: CPT

## 2023-12-08 PROCEDURE — 250N000011 HC RX IP 250 OP 636: Mod: JZ | Performed by: PSYCHIATRY & NEUROLOGY

## 2023-12-08 PROCEDURE — 96365 THER/PROPH/DIAG IV INF INIT: CPT

## 2023-12-08 PROCEDURE — 250N000013 HC RX MED GY IP 250 OP 250 PS 637: Performed by: PSYCHIATRY & NEUROLOGY

## 2023-12-08 PROCEDURE — 258N000003 HC RX IP 258 OP 636: Performed by: PSYCHIATRY & NEUROLOGY

## 2023-12-08 PROCEDURE — 96366 THER/PROPH/DIAG IV INF ADDON: CPT

## 2023-12-08 PROCEDURE — 36415 COLL VENOUS BLD VENIPUNCTURE: CPT

## 2023-12-08 RX ORDER — MEPERIDINE HYDROCHLORIDE 25 MG/ML
25 INJECTION INTRAMUSCULAR; INTRAVENOUS; SUBCUTANEOUS EVERY 30 MIN PRN
Status: DISCONTINUED | OUTPATIENT
Start: 2023-12-08 | End: 2023-12-08 | Stop reason: HOSPADM

## 2023-12-08 RX ORDER — EPINEPHRINE 1 MG/ML
0.3 INJECTION, SOLUTION INTRAMUSCULAR; SUBCUTANEOUS EVERY 5 MIN PRN
Status: CANCELLED | OUTPATIENT
Start: 2023-12-22

## 2023-12-08 RX ORDER — ACETAMINOPHEN 325 MG/1
650 TABLET ORAL ONCE
Status: COMPLETED | OUTPATIENT
Start: 2023-12-08 | End: 2023-12-08

## 2023-12-08 RX ORDER — DIPHENHYDRAMINE HYDROCHLORIDE 50 MG/ML
50 INJECTION INTRAMUSCULAR; INTRAVENOUS
Status: DISCONTINUED | OUTPATIENT
Start: 2023-12-08 | End: 2023-12-08 | Stop reason: HOSPADM

## 2023-12-08 RX ORDER — METHYLPREDNISOLONE SODIUM SUCCINATE 125 MG/2ML
125 INJECTION, POWDER, LYOPHILIZED, FOR SOLUTION INTRAMUSCULAR; INTRAVENOUS ONCE
Status: COMPLETED | OUTPATIENT
Start: 2023-12-08 | End: 2023-12-08

## 2023-12-08 RX ORDER — ALBUTEROL SULFATE 90 UG/1
1-2 AEROSOL, METERED RESPIRATORY (INHALATION)
Status: DISCONTINUED | OUTPATIENT
Start: 2023-12-08 | End: 2023-12-08 | Stop reason: HOSPADM

## 2023-12-08 RX ORDER — DIPHENHYDRAMINE HCL 50 MG
50 CAPSULE ORAL ONCE
Status: CANCELLED | OUTPATIENT
Start: 2023-12-22

## 2023-12-08 RX ORDER — ACETAMINOPHEN 325 MG/1
650 TABLET ORAL ONCE
Status: CANCELLED | OUTPATIENT
Start: 2023-12-22

## 2023-12-08 RX ORDER — HEPARIN SODIUM,PORCINE 10 UNIT/ML
5-20 VIAL (ML) INTRAVENOUS DAILY PRN
Status: CANCELLED | OUTPATIENT
Start: 2023-12-22

## 2023-12-08 RX ORDER — METHYLPREDNISOLONE SODIUM SUCCINATE 125 MG/2ML
125 INJECTION, POWDER, LYOPHILIZED, FOR SOLUTION INTRAMUSCULAR; INTRAVENOUS
Status: CANCELLED
Start: 2023-12-22

## 2023-12-08 RX ORDER — ALBUTEROL SULFATE 0.83 MG/ML
2.5 SOLUTION RESPIRATORY (INHALATION)
Status: CANCELLED | OUTPATIENT
Start: 2023-12-22

## 2023-12-08 RX ORDER — DIPHENHYDRAMINE HYDROCHLORIDE 50 MG/ML
50 INJECTION INTRAMUSCULAR; INTRAVENOUS
Status: CANCELLED
Start: 2023-12-22

## 2023-12-08 RX ORDER — ALBUTEROL SULFATE 90 UG/1
1-2 AEROSOL, METERED RESPIRATORY (INHALATION)
Status: CANCELLED
Start: 2023-12-22

## 2023-12-08 RX ORDER — EPINEPHRINE 1 MG/ML
0.3 INJECTION, SOLUTION INTRAMUSCULAR; SUBCUTANEOUS EVERY 5 MIN PRN
Status: DISCONTINUED | OUTPATIENT
Start: 2023-12-08 | End: 2023-12-08 | Stop reason: HOSPADM

## 2023-12-08 RX ORDER — MEPERIDINE HYDROCHLORIDE 25 MG/ML
25 INJECTION INTRAMUSCULAR; INTRAVENOUS; SUBCUTANEOUS EVERY 30 MIN PRN
Status: CANCELLED | OUTPATIENT
Start: 2023-12-22

## 2023-12-08 RX ORDER — METHYLPREDNISOLONE SODIUM SUCCINATE 125 MG/2ML
125 INJECTION, POWDER, LYOPHILIZED, FOR SOLUTION INTRAMUSCULAR; INTRAVENOUS
Status: DISCONTINUED | OUTPATIENT
Start: 2023-12-08 | End: 2023-12-08 | Stop reason: HOSPADM

## 2023-12-08 RX ORDER — METHYLPREDNISOLONE SODIUM SUCCINATE 125 MG/2ML
125 INJECTION, POWDER, LYOPHILIZED, FOR SOLUTION INTRAMUSCULAR; INTRAVENOUS ONCE
Status: CANCELLED | OUTPATIENT
Start: 2023-12-22

## 2023-12-08 RX ORDER — ALBUTEROL SULFATE 0.83 MG/ML
2.5 SOLUTION RESPIRATORY (INHALATION)
Status: DISCONTINUED | OUTPATIENT
Start: 2023-12-08 | End: 2023-12-08 | Stop reason: HOSPADM

## 2023-12-08 RX ORDER — DIPHENHYDRAMINE HCL 50 MG
50 CAPSULE ORAL ONCE
Status: COMPLETED | OUTPATIENT
Start: 2023-12-08 | End: 2023-12-08

## 2023-12-08 RX ORDER — HEPARIN SODIUM (PORCINE) LOCK FLUSH IV SOLN 100 UNIT/ML 100 UNIT/ML
5 SOLUTION INTRAVENOUS
Status: CANCELLED | OUTPATIENT
Start: 2023-12-22

## 2023-12-08 RX ADMIN — OCRELIZUMAB 300 MG: 300 INJECTION INTRAVENOUS at 09:16

## 2023-12-08 RX ADMIN — METHYLPREDNISOLONE SODIUM SUCCINATE 125 MG: 125 INJECTION, POWDER, FOR SOLUTION INTRAMUSCULAR; INTRAVENOUS at 08:47

## 2023-12-08 RX ADMIN — SODIUM CHLORIDE 250 ML: 9 INJECTION, SOLUTION INTRAVENOUS at 08:47

## 2023-12-08 RX ADMIN — DIPHENHYDRAMINE HYDROCHLORIDE 50 MG: 50 CAPSULE ORAL at 08:47

## 2023-12-08 RX ADMIN — ACETAMINOPHEN 650 MG: 325 TABLET ORAL at 08:47

## 2023-12-08 NOTE — PROGRESS NOTES
Infusion Nursing Note:  Barrington Cárdenas presents today for Ocrevus dose #1.    Patient seen by provider today: No   present during visit today: Not Applicable.    Note: Medication explained to pt and written information given.       Intravenous Access:  Peripheral IV placed.    Treatment Conditions:  Bio Check list done.      Post Infusion Assessment:  Patient tolerated infusion without incident.  Patient observed for 60 minutes post infusion per protocol.  Site patent and intact, free from redness, edema or discomfort.  Access discontinued per protocol.  Biologic Infusion Post Education: Call the triage nurse at your clinic or seek medical attention if you have chills and/or temperature greater than or equal to 100.5, uncontrolled nausea/vomiting, diarrhea, constipation, dizziness, shortness of breath, chest pain, heart palpitations, weakness or any other new or concerning symptoms, questions or concerns.  You cannot have any live virus vaccines prior to or during treatment or up to 6 months post infusion.  If you have an upcoming surgery, medical procedure or dental procedure during treatment, this should be discussed with your ordering physician and your surgeon/dentist.  If you are having any concerning symptom, if you are unsure if you should get your next infusion or wish to speak to a provider before your next infusion, please call your care coordinator or triage nurse at your clinic to notify them so we can adequately serve you.       Discharge Plan:   Patient and/or family verbalized understanding of discharge instructions and all questions answered.  Copy of AVS reviewed with patient and/or family.  Patient will return 12/22/23 for next appointment.  Patient discharged in stable condition accompanied by: wife.  Departure Mode: Ambulatory.      Radha Cortez RN

## 2023-12-08 NOTE — PROGRESS NOTES
~~~ NOTE: If the patient answers yes to any of the questions below, hold the infusion and contact ordering provider or on-call provider.    Do you currently have any signs of illness or infection or are you on any antibiotics? No  Have you recently had an elevated temperature, fever, chills, productive cough, coughing for 3 weeks or longer or hemoptysis, abnormal vital signs, night sweats, chest pain or have you noticed a decrease in your appetite, unexplained weight loss or fatigue? No  Have you had any new, sudden, or worsening abdominal pain? No  Do you have any open wounds or new incisions? (exclude for patients with hidradenitis suppurativa) No  Have you recently been diagnosed with any new nervous system diseases (ie. Multiple sclerosis, Guillain [place], seizures, neurological changes) or cancer diagnosis? Are you on any form of radiation or chemotherapy? Yes, Ocrevus for MS  Have there been any other new onset medical symptoms? No  Are you pregnant or breast feeding or do you have plans of pregnancy in the future? N/A  Do you have any upcoming hospitalizations or surgeries? Does not include esophagogastroduodenoscopy, colonoscopy, endoscopic retrograde cholangiopancreatography (ERCP), endoscopic ultrasound (EUS), dental procedures (including cleanings, fillings, implants, extractions)  or joint aspiration/steroid injections No  Have you or anyone in your household received a live vaccination in the past 4 weeks? Please note: No live vaccines while on biologic/chemotherapy until 6 months after the last treatment. Patient can receive the flu vaccine (shot only).  It is optimal for the patient to get it mid cycle, but it can be given at any time as long as it is not on the day of the infusion. No  If applicable to prescribed medication, confirm negative PPD or quantiferon gold MTB. If positive, verify has negative chest x-ray or the patient is at least 4 weeks post initiation of INH/B6 therapy and have clearance  from provider before infusion N/A  If applicable to prescribed medication, confirm negative hepatitis B surface antigen or hepatitis C. If positive, clearance from provider before infusion. N/A  Rheumatology patients receiving tocilizumab (Actemra): If labs were drawn within the past week, hold dosing until cleared to infuse If AST/ALT > 2 X upper limit normal; ANC < 1.0.  N/A  Patients receiving belimumab (Benlysta): Have you been having any signs of worsening depression or suicidal ideations? N/A

## 2023-12-11 LAB
IGA SERPL-MCNC: 274 MG/DL (ref 84–499)
IGG SERPL-MCNC: 785 MG/DL (ref 610–1616)
IGM SERPL-MCNC: 78 MG/DL (ref 35–242)

## 2023-12-22 ENCOUNTER — INFUSION THERAPY VISIT (OUTPATIENT)
Dept: INFUSION THERAPY | Facility: HOSPITAL | Age: 43
End: 2023-12-22
Payer: COMMERCIAL

## 2023-12-22 VITALS
DIASTOLIC BLOOD PRESSURE: 67 MMHG | SYSTOLIC BLOOD PRESSURE: 108 MMHG | HEART RATE: 80 BPM | RESPIRATION RATE: 16 BRPM | OXYGEN SATURATION: 99 % | TEMPERATURE: 98.6 F

## 2023-12-22 DIAGNOSIS — G35 MULTIPLE SCLEROSIS (H): Primary | ICD-10-CM

## 2023-12-22 PROCEDURE — 258N000003 HC RX IP 258 OP 636: Performed by: PSYCHIATRY & NEUROLOGY

## 2023-12-22 PROCEDURE — 250N000013 HC RX MED GY IP 250 OP 250 PS 637: Performed by: PSYCHIATRY & NEUROLOGY

## 2023-12-22 PROCEDURE — 96365 THER/PROPH/DIAG IV INF INIT: CPT

## 2023-12-22 PROCEDURE — 96375 TX/PRO/DX INJ NEW DRUG ADDON: CPT

## 2023-12-22 PROCEDURE — 250N000011 HC RX IP 250 OP 636: Mod: JZ | Performed by: PSYCHIATRY & NEUROLOGY

## 2023-12-22 PROCEDURE — 96366 THER/PROPH/DIAG IV INF ADDON: CPT

## 2023-12-22 RX ORDER — ALBUTEROL SULFATE 0.83 MG/ML
2.5 SOLUTION RESPIRATORY (INHALATION)
Status: DISCONTINUED | OUTPATIENT
Start: 2023-12-22 | End: 2023-12-22 | Stop reason: HOSPADM

## 2023-12-22 RX ORDER — ALBUTEROL SULFATE 90 UG/1
1-2 AEROSOL, METERED RESPIRATORY (INHALATION)
Status: CANCELLED
Start: 2023-12-22

## 2023-12-22 RX ORDER — ACETAMINOPHEN 325 MG/1
650 TABLET ORAL ONCE
Status: COMPLETED | OUTPATIENT
Start: 2023-12-22 | End: 2023-12-22

## 2023-12-22 RX ORDER — METHYLPREDNISOLONE SODIUM SUCCINATE 125 MG/2ML
125 INJECTION, POWDER, LYOPHILIZED, FOR SOLUTION INTRAMUSCULAR; INTRAVENOUS
Status: DISCONTINUED | OUTPATIENT
Start: 2023-12-22 | End: 2023-12-22 | Stop reason: HOSPADM

## 2023-12-22 RX ORDER — DIPHENHYDRAMINE HCL 50 MG
50 CAPSULE ORAL ONCE
Status: CANCELLED | OUTPATIENT
Start: 2023-12-22

## 2023-12-22 RX ORDER — DIPHENHYDRAMINE HYDROCHLORIDE 50 MG/ML
50 INJECTION INTRAMUSCULAR; INTRAVENOUS
Status: CANCELLED
Start: 2023-12-22

## 2023-12-22 RX ORDER — HEPARIN SODIUM (PORCINE) LOCK FLUSH IV SOLN 100 UNIT/ML 100 UNIT/ML
5 SOLUTION INTRAVENOUS
Status: CANCELLED | OUTPATIENT
Start: 2023-12-22

## 2023-12-22 RX ORDER — ALBUTEROL SULFATE 90 UG/1
1-2 AEROSOL, METERED RESPIRATORY (INHALATION)
Status: DISCONTINUED | OUTPATIENT
Start: 2023-12-22 | End: 2023-12-22 | Stop reason: HOSPADM

## 2023-12-22 RX ORDER — ACETAMINOPHEN 325 MG/1
650 TABLET ORAL ONCE
Status: CANCELLED | OUTPATIENT
Start: 2023-12-22

## 2023-12-22 RX ORDER — MEPERIDINE HYDROCHLORIDE 25 MG/ML
25 INJECTION INTRAMUSCULAR; INTRAVENOUS; SUBCUTANEOUS EVERY 30 MIN PRN
Status: CANCELLED | OUTPATIENT
Start: 2023-12-22

## 2023-12-22 RX ORDER — EPINEPHRINE 1 MG/ML
0.3 INJECTION, SOLUTION INTRAMUSCULAR; SUBCUTANEOUS EVERY 5 MIN PRN
Status: CANCELLED | OUTPATIENT
Start: 2023-12-22

## 2023-12-22 RX ORDER — MEPERIDINE HYDROCHLORIDE 25 MG/ML
25 INJECTION INTRAMUSCULAR; INTRAVENOUS; SUBCUTANEOUS EVERY 30 MIN PRN
Status: DISCONTINUED | OUTPATIENT
Start: 2023-12-22 | End: 2023-12-22 | Stop reason: HOSPADM

## 2023-12-22 RX ORDER — ALBUTEROL SULFATE 0.83 MG/ML
2.5 SOLUTION RESPIRATORY (INHALATION)
Status: CANCELLED | OUTPATIENT
Start: 2023-12-22

## 2023-12-22 RX ORDER — METHYLPREDNISOLONE SODIUM SUCCINATE 125 MG/2ML
125 INJECTION, POWDER, LYOPHILIZED, FOR SOLUTION INTRAMUSCULAR; INTRAVENOUS ONCE
Status: COMPLETED | OUTPATIENT
Start: 2023-12-22 | End: 2023-12-22

## 2023-12-22 RX ORDER — EPINEPHRINE 1 MG/ML
0.3 INJECTION, SOLUTION INTRAMUSCULAR; SUBCUTANEOUS EVERY 5 MIN PRN
Status: DISCONTINUED | OUTPATIENT
Start: 2023-12-22 | End: 2023-12-22 | Stop reason: HOSPADM

## 2023-12-22 RX ORDER — HEPARIN SODIUM,PORCINE 10 UNIT/ML
5-20 VIAL (ML) INTRAVENOUS DAILY PRN
Status: CANCELLED | OUTPATIENT
Start: 2023-12-22

## 2023-12-22 RX ORDER — DIPHENHYDRAMINE HCL 50 MG
50 CAPSULE ORAL ONCE
Status: COMPLETED | OUTPATIENT
Start: 2023-12-22 | End: 2023-12-22

## 2023-12-22 RX ORDER — METHYLPREDNISOLONE SODIUM SUCCINATE 125 MG/2ML
125 INJECTION, POWDER, LYOPHILIZED, FOR SOLUTION INTRAMUSCULAR; INTRAVENOUS
Status: CANCELLED
Start: 2023-12-22

## 2023-12-22 RX ORDER — DIPHENHYDRAMINE HYDROCHLORIDE 50 MG/ML
50 INJECTION INTRAMUSCULAR; INTRAVENOUS
Status: DISCONTINUED | OUTPATIENT
Start: 2023-12-22 | End: 2023-12-22 | Stop reason: HOSPADM

## 2023-12-22 RX ORDER — METHYLPREDNISOLONE SODIUM SUCCINATE 125 MG/2ML
125 INJECTION, POWDER, LYOPHILIZED, FOR SOLUTION INTRAMUSCULAR; INTRAVENOUS ONCE
Status: CANCELLED | OUTPATIENT
Start: 2023-12-22

## 2023-12-22 RX ADMIN — ACETAMINOPHEN 650 MG: 325 TABLET ORAL at 08:31

## 2023-12-22 RX ADMIN — METHYLPREDNISOLONE SODIUM SUCCINATE 125 MG: 125 INJECTION, POWDER, FOR SOLUTION INTRAMUSCULAR; INTRAVENOUS at 08:31

## 2023-12-22 RX ADMIN — OCRELIZUMAB 300 MG: 300 INJECTION INTRAVENOUS at 08:54

## 2023-12-22 RX ADMIN — SODIUM CHLORIDE 250 ML: 9 INJECTION, SOLUTION INTRAVENOUS at 08:54

## 2023-12-22 RX ADMIN — DIPHENHYDRAMINE HYDROCHLORIDE 50 MG: 50 CAPSULE ORAL at 08:30

## 2023-12-22 NOTE — PROGRESS NOTES
Infusion Nursing Note:  Barrington PAULIE Cárdenas presents today for 2nd Ocrevus infusion.    Patient seen by provider today: No   present during visit today: Not Applicable.    Note: Patient was given all pre-meds and transfused per order. No reactions occurred.      Intravenous Access:  Peripheral IV placed.    Treatment Conditions:  Biological Infusion Checklist:  ~~~ NOTE: If the patient answers yes to any of the questions below, hold the infusion and contact ordering provider or on-call provider.    Have you recently had an elevated temperature, fever, chills, productive cough, coughing for 3 weeks or longer or hemoptysis,  abnormal vital signs, night sweats,  chest pain or have you noticed a decrease in your appetite, unexplained weight loss or fatigue? No  Do you have any open wounds or new incisions? No  Do you have any upcoming hospitalizations or surgeries? Does not include esophagogastroduodenoscopy, colonoscopy, endoscopic retrograde cholangiopancreatography (ERCP), endoscopic ultrasound (EUS), dental procedures or joint aspiration/steroid injections No  Do you currently have any signs of illness or infection or are you on any antibiotics? No  Have you had any new, sudden or worsening abdominal pain? No  Have you or anyone in your household received a live vaccination in the past 4 weeks? Please note: No live vaccines while on biologic/chemotherapy until 6 months after the last treatment. Patient can receive the flu vaccine (shot only), pneumovax and the Covid vaccine. It is optimal for the patient to get these vaccines mid cycle, but they can be given at any time as long as it is not on the day of the infusion. No  Have you recently been diagnosed with any new nervous system diseases (ie. Multiple sclerosis, Guillain [place], seizures, neurological changes) or cancer diagnosis? Are you on any form of radiation or chemotherapy? No  Are you pregnant or breast feeding or do you have plans of pregnancy in  the future? No  Have you been having any signs of worsening depression or suicidal ideations?  (benlysta only) No  Have there been any other new onset medical symptoms? No  Have you had any new blood clots? (IVIG only) No      Post Infusion Assessment:  Biologic Infusion Post Education: Call the triage nurse at your clinic or seek medical attention if you have chills and/or temperature greater than or equal to 100.5, uncontrolled nausea/vomiting, diarrhea, constipation, dizziness, shortness of breath, chest pain, heart palpitations, weakness or any other new or concerning symptoms, questions or concerns.  You cannot have any live virus vaccines prior to or during treatment or up to 6 months post infusion.  If you have an upcoming surgery, medical procedure or dental procedure during treatment, this should be discussed with your ordering physician and your surgeon/dentist.  If you are having any concerning symptom, if you are unsure if you should get your next infusion or wish to speak to a provider before your next infusion, please call your care coordinator or triage nurse at your clinic to notify them so we can adequately serve you.       Discharge Plan:   Patient discharged in stable condition accompanied by: wife.      Kyle Watkins RN

## 2024-01-09 ENCOUNTER — OFFICE VISIT (OUTPATIENT)
Dept: FAMILY MEDICINE | Facility: CLINIC | Age: 44
End: 2024-01-09
Payer: COMMERCIAL

## 2024-01-09 VITALS
RESPIRATION RATE: 16 BRPM | DIASTOLIC BLOOD PRESSURE: 80 MMHG | SYSTOLIC BLOOD PRESSURE: 130 MMHG | BODY MASS INDEX: 32.21 KG/M2 | HEIGHT: 74 IN | HEART RATE: 84 BPM | WEIGHT: 251 LBS | OXYGEN SATURATION: 95 %

## 2024-01-09 DIAGNOSIS — Z76.89 ENCOUNTER TO ESTABLISH CARE: Primary | ICD-10-CM

## 2024-01-09 DIAGNOSIS — G35 MULTIPLE SCLEROSIS (H): ICD-10-CM

## 2024-01-09 PROCEDURE — 99213 OFFICE O/P EST LOW 20 MIN: CPT | Performed by: FAMILY MEDICINE

## 2024-01-09 NOTE — PROGRESS NOTES
Assessment & Plan     ICD-10-CM    1. Encounter to establish care  Z76.89       2. Multiple sclerosis (H)  G35         Patient is a 44 gentleman with recent diagnosis of multiple sclerosis with here to establish care as he does not have a primary.  No other concerning history.  No family history known as he is adopted.  He is  and lives with his wife who is an infusion nurse with [place] [employer] yes.  No acute concerns at this time.  Reviewed preventative cares including immunizations.  Patient will get his pneumonia and COVID before his infusions as recommended.  He is following with neurology and has an upcoming appointment.  Labs and hospital visit reviewed today.     MEDICATIONS:  Continue current medications without change    Earle Lindsay MD  [facility]    Florina Balderas is a 43 year old, presenting for the following health issues:  Establish Care (Pt was being seen at the [facility], pt lives closer to us. )        1/9/2024     2:11 PM   Additional Questions   Roomed by Kimberly Hernandez CMA       History of Present Illness       Reason for visit:  Establish primary care    He eats 4 or more servings of fruits and vegetables daily.He consumes 0 sweetened beverage(s) daily.He exercises with enough effort to increase his heart rate 10 to 19 minutes per day.  He exercises with enough effort to increase his heart rate 3 or less days per week.   He is taking medications regularly.     Patient Active Problem List   Diagnosis    Optic neuritis    Multiple sclerosis (H)     Current Outpatient Medications   Medication    vitamin D3 (CHOLECALCIFEROL) 1.25 MG (48456 UT) capsule     No current facility-administered medications for this visit.           Review of Systems   Constitutional, HEENT, cardiovascular, pulmonary, gi and gu systems are negative, except as otherwise noted.      Objective    /80 (BP Location: Left arm, Patient Position: Sitting, Cuff Size: Adult  "Large)   Pulse 84   Resp 16   Ht 1.873 m (6' 1.75\")   Wt 113.9 kg (251 lb)   SpO2 95%   BMI 32.45 kg/m    Body mass index is 32.45 kg/m .  Physical Exam   GENERAL: healthy, alert and no distress                  "

## 2024-02-16 ENCOUNTER — LAB (OUTPATIENT)
Dept: LAB | Facility: CLINIC | Age: 44
End: 2024-02-16
Payer: COMMERCIAL

## 2024-02-16 ENCOUNTER — OFFICE VISIT (OUTPATIENT)
Dept: NEUROLOGY | Facility: CLINIC | Age: 44
End: 2024-02-16
Attending: PSYCHIATRY & NEUROLOGY
Payer: COMMERCIAL

## 2024-02-16 VITALS
OXYGEN SATURATION: 99 % | SYSTOLIC BLOOD PRESSURE: 142 MMHG | BODY MASS INDEX: 33.73 KG/M2 | HEART RATE: 75 BPM | DIASTOLIC BLOOD PRESSURE: 83 MMHG | WEIGHT: 260.9 LBS

## 2024-02-16 DIAGNOSIS — Z51.81 THERAPEUTIC DRUG MONITORING: ICD-10-CM

## 2024-02-16 DIAGNOSIS — G35 MULTIPLE SCLEROSIS (H): Primary | ICD-10-CM

## 2024-02-16 DIAGNOSIS — G35 MULTIPLE SCLEROSIS (H): ICD-10-CM

## 2024-02-16 DIAGNOSIS — E55.9 VITAMIN D DEFICIENCY: ICD-10-CM

## 2024-02-16 LAB
BASOPHILS # BLD AUTO: 0.1 10E3/UL (ref 0–0.2)
BASOPHILS NFR BLD AUTO: 2 %
CD19 B CELL COMMENT: ABNORMAL
CD19 CELLS # BLD: <1 CELLS/UL (ref 107–698)
CD19 CELLS NFR BLD: <1 % (ref 6–27)
EOSINOPHIL # BLD AUTO: 0.5 10E3/UL (ref 0–0.7)
EOSINOPHIL NFR BLD AUTO: 7 %
ERYTHROCYTE [DISTWIDTH] IN BLOOD BY AUTOMATED COUNT: 13.3 % (ref 10–15)
HCT VFR BLD AUTO: 45 % (ref 40–53)
HGB BLD-MCNC: 16 G/DL (ref 13.3–17.7)
IMM GRANULOCYTES # BLD: 0 10E3/UL
IMM GRANULOCYTES NFR BLD: 0 %
LYMPHOCYTES # BLD AUTO: 1.4 10E3/UL (ref 0.8–5.3)
LYMPHOCYTES NFR BLD AUTO: 19 %
MCH RBC QN AUTO: 30.8 PG (ref 26.5–33)
MCHC RBC AUTO-ENTMCNC: 35.6 G/DL (ref 31.5–36.5)
MCV RBC AUTO: 87 FL (ref 78–100)
MONOCYTES # BLD AUTO: 0.6 10E3/UL (ref 0–1.3)
MONOCYTES NFR BLD AUTO: 8 %
NEUTROPHILS # BLD AUTO: 4.7 10E3/UL (ref 1.6–8.3)
NEUTROPHILS NFR BLD AUTO: 64 %
NRBC # BLD AUTO: 0 10E3/UL
NRBC BLD AUTO-RTO: 0 /100
PLATELET # BLD AUTO: 234 10E3/UL (ref 150–450)
RBC # BLD AUTO: 5.19 10E6/UL (ref 4.4–5.9)
VIT D+METAB SERPL-MCNC: 62 NG/ML (ref 20–50)
WBC # BLD AUTO: 7.3 10E3/UL (ref 4–11)

## 2024-02-16 PROCEDURE — 86355 B CELLS TOTAL COUNT: CPT | Performed by: PSYCHIATRY & NEUROLOGY

## 2024-02-16 PROCEDURE — 99000 SPECIMEN HANDLING OFFICE-LAB: CPT | Performed by: PATHOLOGY

## 2024-02-16 PROCEDURE — 36415 COLL VENOUS BLD VENIPUNCTURE: CPT | Performed by: PATHOLOGY

## 2024-02-16 PROCEDURE — 82784 ASSAY IGA/IGD/IGG/IGM EACH: CPT | Performed by: PSYCHIATRY & NEUROLOGY

## 2024-02-16 PROCEDURE — 82306 VITAMIN D 25 HYDROXY: CPT | Performed by: PSYCHIATRY & NEUROLOGY

## 2024-02-16 PROCEDURE — 99214 OFFICE O/P EST MOD 30 MIN: CPT | Performed by: PSYCHIATRY & NEUROLOGY

## 2024-02-16 PROCEDURE — 85025 COMPLETE CBC W/AUTO DIFF WBC: CPT | Performed by: PATHOLOGY

## 2024-02-16 ASSESSMENT — PAIN SCALES - GENERAL: PAINLEVEL: NO PAIN (0)

## 2024-02-16 NOTE — NURSING NOTE
Chief Complaint   Patient presents with    MS    RECHECK     3 month follow up      Vitals were taken and medications were reconciled.   Robert St, EMT  9:27 AM

## 2024-02-16 NOTE — PROGRESS NOTES
Date of Service: 2/16/2024    [facility] Neurology   MS Clinic Evaluation    Subjective: 43-year-old otherwise healthy man who presents for evaluation of multiple sclerosis.    No discrete new symptoms     He is experiencing uhthoff's phenomenon with vision - hockey, but also with bright light exposure    Received first 2 half doses of Ocrevus.  He did experience some itching in his throat and sneezed a couple times.  Symptoms lasted 15 to 30 minutes.  He otherwise completed the infusion without incident.  He tolerated the second half dose, but was significantly fatigued for 1 day after the infusion.    He does have follow-up with ophthalmology arranged at [facility].  Visual field testing when done was within normal limits.    He has had a few head cold since starting Ocrevus    Disease onset: age 43, R ON  Last relapse: same    DMD hx:   Ocrevus 12/8/23-present< LD 12/22/23    No Known Allergies    Current Outpatient Medications   Medication    vitamin D3 (CHOLECALCIFEROL) 1.25 MG (60034 UT) capsule     No current facility-administered medications for this visit.        Past medical, surgical, social and family history was personally reviewed. Pertinent details noted above.     Physical Examination:   BP (!) 142/83 (BP Location: Right arm, Patient Position: Sitting, Cuff Size: Adult Regular)   Pulse 75   Wt 118.3 kg (260 lb 14.4 oz)   SpO2 99%   BMI 33.73 kg/m      General: no acute distress      Tests/Imaging:   CSF 7 ocb     Aqp4, mog neg    B12 1156  Vitamin D 20  JCV Ab 0.21    Igg 785  ALC 1700      MRI Brain  10/2023 - moderate to severe lesion burden with mix of periventricular lesions, deep white matter and juxtacortical lesions, R ON gd+    MRI Cervical spine   10/2023 - 1-2 small lesions, gd-     MRI Thoracic spine   10/2023 - no definite lesions     Assessment: 43-year-old man with relapsing remitting multiple sclerosis status post ocrelizumab.  I have recommended he undergo blood work today to  assess for toxicity related to Ocrevus.    He will plan to continue with Ocrevus every 6 months.  Standard dosing.  It would be reasonable for his next infusion to take place in a monitored setting given the itching that he experienced with a half dose.  Subsequent infusions could be performed in a home infusion setting if the first full dose occurs uneventfully.    MRI is advised in 6 months.    Plan:   -Blood work today  - Blood work should be done again on the day of infusion  - MRI in 6 months  - Follow-up after MRI    Note was completed with the assistance of Dragon Fluency software which can often result in accidental word substitutions.     A total of 30 minutes on the date of service were spent in the care of this patient.   Jael Mchugh MD on 2/16/2024 at 9:45 AM

## 2024-02-16 NOTE — LETTER
2/16/2024       RE: Barrington Cárdenas  [address]  [place] [address]       Dear Colleague,    Thank you for referring your patient, Barrington Cárdenas, to the [facility] MULTIPLE SCLEROSIS CLINIC [place] [facility]. Please see a copy of my visit note below.    Date of Service: 2/16/2024    [facility] Neurology   MS Clinic Evaluation    Subjective: 43-year-old otherwise healthy man who presents for evaluation of multiple sclerosis.    No discrete new symptoms     He is experiencing uhthoff's phenomenon with vision - hockey, but also with bright light exposure    Received first 2 half doses of Ocrevus.  He did experience some itching in his throat and sneezed a couple times.  Symptoms lasted 15 to 30 minutes.  He otherwise completed the infusion without incident.  He tolerated the second half dose, but was significantly fatigued for 1 day after the infusion.    He does have follow-up with ophthalmology arranged at [facility].  Visual field testing when done was within normal limits.    He has had a few head cold since starting Ocrevus    Disease onset: age 43, R ON  Last relapse: same    DMD hx:   Ocrevus 12/8/23-present< LD 12/22/23    No Known Allergies    Current Outpatient Medications   Medication    vitamin D3 (CHOLECALCIFEROL) 1.25 MG (56078 UT) capsule     No current facility-administered medications for this visit.        Past medical, surgical, social and family history was personally reviewed. Pertinent details noted above.     Physical Examination:   BP (!) 142/83 (BP Location: Right arm, Patient Position: Sitting, Cuff Size: Adult Regular)   Pulse 75   Wt 118.3 kg (260 lb 14.4 oz)   SpO2 99%   BMI 33.73 kg/m      General: no acute distress      Tests/Imaging:   CSF 7 ocb     Aqp4, mog neg    B12 1156  Vitamin D 20  JCV Ab 0.21    Igg 785  ALC 1700      MRI Brain  10/2023 - moderate to severe lesion burden with mix of periventricular  lesions, deep white matter and juxtacortical lesions, R ON gd+    MRI Cervical spine   10/2023 - 1-2 small lesions, gd-     MRI Thoracic spine   10/2023 - no definite lesions     Assessment: 43-year-old man with relapsing remitting multiple sclerosis status post ocrelizumab.  I have recommended he undergo blood work today to assess for toxicity related to Ocrevus.    He will plan to continue with Ocrevus every 6 months.  Standard dosing.  It would be reasonable for his next infusion to take place in a monitored setting given the itching that he experienced with a half dose.  Subsequent infusions could be performed in a home infusion setting if the first full dose occurs uneventfully.    MRI is advised in 6 months.    Plan:   -Blood work today  - Blood work should be done again on the day of infusion  - MRI in 6 months  - Follow-up after MRI    Note was completed with the assistance of Dragon Fluency software which can often result in accidental word substitutions.     A total of 30 minutes on the date of service were spent in the care of this patient.   Jael Mchugh MD on 2/16/2024 at 9:45 AM          Again, thank you for allowing me to participate in the care of your patient.      Sincerely,    Jael Mchugh MD

## 2024-02-16 NOTE — PATIENT INSTRUCTIONS
Blood work today   Again on the day of infusion     Mri in 6 months     Ocrevus in June - talk to pharmacist as they help with coordinating therapy     Follow up after MRI

## 2024-02-19 LAB — IGG SERPL-MCNC: 898 MG/DL (ref 610–1616)

## 2024-02-28 NOTE — PROGRESS NOTES
Medication Therapy Management (MTM) Encounter    ASSESSMENT:                            Medication Adherence/Access: No issues identified    MS:   Patient would benefit from continuing Ocrevus and switching to [facility] infusion per insurance preference. Given minor reaction during loading dose of Ocrevus, recommend patient infuse at [facility] suites. Baseline labs reviewed. All patient's questions were answered. Education provided to the patient on medication dosing and potential side effects as well as the infusion process. Patient educated on the need to avoid live-vaccinations and the recommendation to get all indicated non-live vaccines about 4 weeks prior to next infusion once on therapy. Therapy plan created and [facility] intake forms complete.      PLAN:                            Medication list updated   [facility] intake forms complete, ambulatory care prescription sent and therapy plan reordered.     Follow-up: 11/4 @ 8 am via video visit      SUBJECTIVE/OBJECTIVE:                          Andrey Cárdenas is a 43 year old male contacted via secure video for an initial visit. He was referred to me from Dr. Mchugh.      Reason for visit: Infusion Coordination.    Allergies/ADRs: Reviewed in chart  Past Medical History: Reviewed in chart  Tobacco: He reports that he has never smoked. He quit smokeless tobacco use about 18 years ago.  His smokeless tobacco use included chew.  Alcohol: 4-6 beverages / week    Medication Adherence/Access: no issues reported    MS:   - Ocrevus infusion. Had loading doses on 12/8/23 and 12/22/23 at [facility]. For the first initial dose, he developed itching in his sinuses and the back of his throat during the infusion for a short period of time. Prior the end of the infusion, symptoms was gone.   - Vitamin D3 38108 units every day on Fridays  Balance and gait have been good. Reports that he was having vision issues in his right eye; glares  are the biggest issue but overall vision is slightly improving. Does have glare reducing glasses and sunglasses which have been helpful. He plays hockey and when he gets hot, his right eye vision will get dimmer. The last month or so things are getting better but are still noticeable.     Baseline screenings:   Hep B surface antibody non-reactive   Hep B surface antigen non-reactive  Hep B core antibody non-reactive  Immunoglobulins A, G, M completed  NATIVIDAD Virus antibody indeterminate   CBC with differential completed     Today's Vitals: There were no vitals taken for this visit.  ----------------    I spent 27 minutes with this patient today. All changes were made via collaborative practice agreement with Dr. Mchugh. A copy of the visit note was provided to the patient's provider(s).    A summary of these recommendations was sent via [employer].    Cris Dawson, Pharm.D., MPH  Medication Therapy Management Pharmacist   [facility]    Remington CareyD.  Medication Therapy Management Pharmacist  [facility]    Telemedicine Visit Details  Type of service:  Video Conference via [employer]  Start Time:  8:00 AM  End Time: 8:27 AM     Medication Therapy Recommendations  No medication therapy recommendations to display

## 2024-03-01 ENCOUNTER — TELEPHONE (OUTPATIENT)
Dept: NEUROLOGY | Facility: CLINIC | Age: 44
End: 2024-03-01
Payer: COMMERCIAL

## 2024-03-01 ENCOUNTER — VIRTUAL VISIT (OUTPATIENT)
Dept: NEUROLOGY | Facility: CLINIC | Age: 44
End: 2024-03-01
Attending: PSYCHIATRY & NEUROLOGY
Payer: COMMERCIAL

## 2024-03-01 DIAGNOSIS — G35 MULTIPLE SCLEROSIS (H): Primary | ICD-10-CM

## 2024-03-01 RX ORDER — ALBUTEROL SULFATE 0.83 MG/ML
2.5 SOLUTION RESPIRATORY (INHALATION)
Status: CANCELLED | OUTPATIENT
Start: 2024-06-08

## 2024-03-01 RX ORDER — HEPARIN SODIUM (PORCINE) LOCK FLUSH IV SOLN 100 UNIT/ML 100 UNIT/ML
5 SOLUTION INTRAVENOUS
Status: CANCELLED | OUTPATIENT
Start: 2024-06-08

## 2024-03-01 RX ORDER — OCRELIZUMAB 300 MG/10ML
600 INJECTION INTRAVENOUS
COMMUNITY
End: 2024-03-01

## 2024-03-01 RX ORDER — METHYLPREDNISOLONE SODIUM SUCCINATE 125 MG/2ML
125 INJECTION, POWDER, LYOPHILIZED, FOR SOLUTION INTRAMUSCULAR; INTRAVENOUS ONCE
Status: CANCELLED | OUTPATIENT
Start: 2024-06-08

## 2024-03-01 RX ORDER — DIPHENHYDRAMINE HCL 25 MG
50 CAPSULE ORAL ONCE
Status: CANCELLED | OUTPATIENT
Start: 2024-06-08

## 2024-03-01 RX ORDER — OCRELIZUMAB 300 MG/10ML
600 INJECTION INTRAVENOUS
Status: SHIPPED
Start: 2024-03-01

## 2024-03-01 RX ORDER — ALBUTEROL SULFATE 90 UG/1
1-2 AEROSOL, METERED RESPIRATORY (INHALATION)
Status: CANCELLED
Start: 2024-06-08

## 2024-03-01 RX ORDER — DIPHENHYDRAMINE HYDROCHLORIDE 50 MG/ML
50 INJECTION INTRAMUSCULAR; INTRAVENOUS
Status: CANCELLED
Start: 2024-06-08

## 2024-03-01 RX ORDER — METHYLPREDNISOLONE SODIUM SUCCINATE 125 MG/2ML
125 INJECTION, POWDER, LYOPHILIZED, FOR SOLUTION INTRAMUSCULAR; INTRAVENOUS
Status: CANCELLED
Start: 2024-06-08

## 2024-03-01 RX ORDER — ACETAMINOPHEN 325 MG/1
650 TABLET ORAL ONCE
Status: CANCELLED | OUTPATIENT
Start: 2024-06-08

## 2024-03-01 RX ORDER — EPINEPHRINE 1 MG/ML
0.3 INJECTION, SOLUTION, CONCENTRATE INTRAVENOUS EVERY 5 MIN PRN
Status: CANCELLED | OUTPATIENT
Start: 2024-06-08

## 2024-03-01 RX ORDER — HEPARIN SODIUM,PORCINE 10 UNIT/ML
5-20 VIAL (ML) INTRAVENOUS DAILY PRN
Status: CANCELLED | OUTPATIENT
Start: 2024-06-08

## 2024-03-01 NOTE — Clinical Note
3/1/2024       RE: Barrington Cárdenas  [address] [facility] [address]     Dear Colleague,    Thank you for referring your patient, Barrington Cárdenas, to the [facility] MULTIPLE SCLEROSIS CLINIC [facility]. Please see a copy of my visit note below.    Medication Therapy Management (MTM) Encounter    ASSESSMENT:                            Medication Adherence/Access: {adherencechoices:365709}    ***:  ***      PLAN:                            Please call the {Infusion Centers:[id number]} infusion center to schedule your infusion appointment. About 1-2 weeks before your scheduled appointment, their finance team will look into making sure the location and medication is covered by your insurance. If you have any issues at all, please reach out to me.      Follow-up: 11/4 @ 8 am via video visit    SUBJECTIVE/OBJECTIVE:                          Andrey Cárdenas is a 43 year old male contacted via secure video for an initial visit. He was referred to me from Dr. Mchugh.      Reason for visit: Infusion Coordination.    Allergies/ADRs: Reviewed in chart  Past Medical History: Reviewed in chart  Tobacco: He reports that he has never smoked. He quit smokeless tobacco use about 18 years ago.  His smokeless tobacco use included chew.  Alcohol: 4-6 beverages / week      Medication Adherence/Access: no issues reported    MS:   - Ocrevus infusion. Had loading doses on 12/8/23 and 12/22/23 at [facility] and developed itching. For the first initial dose, he developed itching in his sinuses and the back of his throat during the infusion for a short period of time. Prior the end of the infusion, symptoms was gone.   - Vitamin D3 52779 units every day on Fridays  Balance and gait has been good. Reports that he was losing vision in his right eye and that is his biggest symptoms. Notes that glares are the biggest issue but overall vision is slightly improving. Does have  glare reducing glasses and sunglasses which has been helpful. He plays hockey and when he gets hot, his right eye vision will get dimmer. The last month or so things are getting better but are still noticeable.     Baseline screenings:   Hep B surface antibody non-reactive   Hep B surface antigen non-reactive  Hep B core antibody non-reactive  Immunoglobulins A, G, M completed  NATIVIDAD Virus antibody positive   Varicella antibody positive ***  CBC with differential completed   Hepatic panel completed ***    ***:   ***    Today's Vitals: There were no vitals taken for this visit.  ----------------      I spent 27 minutes with this patient today. All changes were made via verbal approval with Dr. Mchugh. A copy of the visit note was provided to the patient's provider(s).    A summary of these recommendations was sent via [employer].    Cris Dawson, Pharm.D., MPH  Medication Therapy Management Pharmacist   [facility]    Telemedicine Visit Details  Type of service:  Video Conference via [employer]  Start Time:  8:00 AM  End Time: 8:27 AM     Medication Therapy Recommendations  No medication therapy recommendations to display       Medication Therapy Management (MTM) Encounter    ASSESSMENT:                            Medication Adherence/Access: {adherencechoices:[id number]}    ***:  ***      PLAN:                            Medication list updated   [facility] intake forms complete, ambulatory care prescription sent and therapy plan reordered.     Follow-up: 11/4 @ 8 am via video visit    SUBJECTIVE/OBJECTIVE:                          Andrey Cárdenas is a 43 year old male contacted via secure video for an initial visit. He was referred to me from Dr. Mchugh.      Reason for visit: Infusion Coordination.    Allergies/ADRs: Reviewed in chart  Past Medical History: Reviewed in chart  Tobacco: He reports that he has never smoked. He quit smokeless tobacco use about 18 years ago.  His smokeless tobacco use  included chew.  Alcohol: 4-6 beverages / week      Medication Adherence/Access: no issues reported    MS:   - Ocrevus infusion. Had loading doses on 12/8/23 and 12/22/23 at [facility] [place]. For the first initial dose, he developed itching in his sinuses and the back of his throat during the infusion for a short period of time. Prior the end of the infusion, symptoms was gone.   - Vitamin D3 46715 units every day on Fridays  Balance and gait has been good. Reports that he was losing vision in his right eye and that is his biggest symptoms. Notes that glares are the biggest issue but overall vision is slightly improving. Does have glare reducing glasses and sunglasses which has been helpful. He plays hockey and when he gets hot, his right eye vision will get dimmer. The last month or so things are getting better but are still noticeable.     Baseline screenings:   Hep B surface antibody non-reactive   Hep B surface antigen non-reactive  Hep B core antibody non-reactive  Immunoglobulins A, G, M completed  NATIVIDAD Virus antibody positive   Varicella antibody positive ***  CBC with differential completed   Hepatic panel completed ***    ***:   ***    Today's Vitals: There were no vitals taken for this visit.  ----------------      I spent 27 minutes with this patient today. All changes were made via verbal approval with Dr. Mchugh. A copy of the visit note was provided to the patient's provider(s).    A summary of these recommendations was sent via [employer].    Cris Dawson, Pharm.D., MPH  Medication Therapy Management Pharmacist   [facility]    Telemedicine Visit Details  Type of service:  Video Conference via [employer]  Start Time:  8:00 AM  End Time: 8:27 AM     Medication Therapy Recommendations  No medication therapy recommendations to display         Again, thank you for allowing me to participate in the care of your patient.      Sincerely,    Cris Dawson, [facility]

## 2024-03-01 NOTE — PATIENT INSTRUCTIONS
"Recommendations from today's MTM visit:                                                    MTM (medication therapy management) is a service provided by a clinical pharmacist designed to help you get the most of out of your medicines.      Medication list updated   [facility] intake forms complete, ambulatory care prescription sent and therapy plan reordered.     Follow-up: 11/4 @ 8 am via video visit    It was great speaking with you today.  I value your experience and would be very thankful for your time in providing feedback in our clinic survey. In the next few days, you may receive an email or text message from [employer] with a link to a survey related to your  clinical pharmacist.\"     To schedule another MTM appointment, please call the clinic directly or you may call the MTM scheduling line at [phone].    My Clinical Pharmacist's contact information:                                                      Please feel free to contact me with any questions or concerns you have.      Cris Dawson, Pharm.D., MPH  Medication Therapy Management Pharmacist   [facility]  Direct Voicemail: [phone]   "

## 2024-05-09 ENCOUNTER — TRANSFERRED RECORDS (OUTPATIENT)
Dept: HEALTH INFORMATION MANAGEMENT | Facility: CLINIC | Age: 44
End: 2024-05-09
Payer: COMMERCIAL

## 2024-05-22 ENCOUNTER — CARE COORDINATION (OUTPATIENT)
Dept: PHARMACY | Facility: CLINIC | Age: 44
End: 2024-05-22
Payer: COMMERCIAL

## 2024-05-22 NOTE — PROGRESS NOTES
Referred to [facility]    Barrington Cárdenas, 1980  Medication (name, frequency and route):  Ocrevus [id number]     Start of Care Date: Friday 6/21/24 @ 0830(Confirmed with patient)    Infusion location: [facility]  Skilled Nursing will be provided by: [facility]  @ [phone]    EMILY Dinh

## 2024-06-04 NOTE — PROGRESS NOTES
Approval for Ocrevus has been received from [facility]. Authorization valid from 05/14/2024 through 05/14/2025.  oRbert St EMT June 4, 2024

## 2024-06-16 ENCOUNTER — HEALTH MAINTENANCE LETTER (OUTPATIENT)
Age: 44
End: 2024-06-16

## 2024-06-20 ENCOUNTER — DOCUMENTATION ONLY (OUTPATIENT)
Dept: NEUROLOGY | Facility: CLINIC | Age: 44
End: 2024-06-20
Payer: COMMERCIAL

## 2024-06-20 NOTE — PROGRESS NOTES
Prescriber orders for Ocrevus have been received from [facility], orders have been placed in Dr. Mchugh's folder for review and signature.   Robert St EMT June 20, 2024

## 2024-06-21 ENCOUNTER — LAB REQUISITION (OUTPATIENT)
Dept: LAB | Facility: CLINIC | Age: 44
End: 2024-06-21
Payer: COMMERCIAL

## 2024-06-21 ENCOUNTER — OFFICE VISIT (OUTPATIENT)
Dept: PHARMACY | Facility: CLINIC | Age: 44
End: 2024-06-21
Payer: COMMERCIAL

## 2024-06-21 ENCOUNTER — MEDICAL CORRESPONDENCE (OUTPATIENT)
Dept: HEALTH INFORMATION MANAGEMENT | Facility: CLINIC | Age: 44
End: 2024-06-21

## 2024-06-21 ENCOUNTER — DOCUMENTATION ONLY (OUTPATIENT)
Dept: PHARMACY | Facility: CLINIC | Age: 44
End: 2024-06-21

## 2024-06-21 DIAGNOSIS — G35 MULTIPLE SCLEROSIS (H): ICD-10-CM

## 2024-06-21 DIAGNOSIS — G35 MULTIPLE SCLEROSIS (H): Primary | ICD-10-CM

## 2024-06-21 LAB
BASOPHILS # BLD AUTO: 0.1 10E3/UL (ref 0–0.2)
BASOPHILS NFR BLD AUTO: 2 %
CD19 B CELL COMMENT: ABNORMAL
CD19 CELLS # BLD: 43 CELLS/UL (ref 107–698)
CD19 CELLS NFR BLD: 3 % (ref 6–27)
EOSINOPHIL # BLD AUTO: 0.5 10E3/UL (ref 0–0.7)
EOSINOPHIL NFR BLD AUTO: 7 %
ERYTHROCYTE [DISTWIDTH] IN BLOOD BY AUTOMATED COUNT: 13.4 % (ref 10–15)
HCT VFR BLD AUTO: 43.5 % (ref 40–53)
HGB BLD-MCNC: 15.8 G/DL (ref 13.3–17.7)
IGG SERPL-MCNC: 929 MG/DL (ref 610–1616)
IMM GRANULOCYTES # BLD: 0 10E3/UL
IMM GRANULOCYTES NFR BLD: 0 %
LYMPHOCYTES # BLD AUTO: 1.4 10E3/UL (ref 0.8–5.3)
LYMPHOCYTES NFR BLD AUTO: 20 %
MCH RBC QN AUTO: 30.8 PG (ref 26.5–33)
MCHC RBC AUTO-ENTMCNC: 36.3 G/DL (ref 31.5–36.5)
MCV RBC AUTO: 85 FL (ref 78–100)
MONOCYTES # BLD AUTO: 0.6 10E3/UL (ref 0–1.3)
MONOCYTES NFR BLD AUTO: 9 %
NEUTROPHILS # BLD AUTO: 4.2 10E3/UL (ref 1.6–8.3)
NEUTROPHILS NFR BLD AUTO: 62 %
NRBC # BLD AUTO: 0 10E3/UL
NRBC BLD AUTO-RTO: 0 /100
PLATELET # BLD AUTO: 231 10E3/UL (ref 150–450)
RBC # BLD AUTO: 5.13 10E6/UL (ref 4.4–5.9)
WBC # BLD AUTO: 6.8 10E3/UL (ref 4–11)

## 2024-06-21 PROCEDURE — 85025 COMPLETE CBC W/AUTO DIFF WBC: CPT | Performed by: PSYCHIATRY & NEUROLOGY

## 2024-06-21 PROCEDURE — 86355 B CELLS TOTAL COUNT: CPT | Performed by: PSYCHIATRY & NEUROLOGY

## 2024-06-21 PROCEDURE — 82784 ASSAY IGA/IGD/IGG/IGM EACH: CPT | Performed by: PSYCHIATRY & NEUROLOGY

## 2024-06-21 RX ORDER — EPINEPHRINE 1 MG/ML
0.3 INJECTION, SOLUTION, CONCENTRATE INTRAVENOUS EVERY 5 MIN PRN
OUTPATIENT
Start: 2024-12-18

## 2024-06-21 RX ORDER — ACETAMINOPHEN 325 MG/1
650 TABLET ORAL ONCE
OUTPATIENT
Start: 2024-12-18

## 2024-06-21 RX ORDER — DIPHENHYDRAMINE HYDROCHLORIDE 50 MG/ML
50 INJECTION INTRAMUSCULAR; INTRAVENOUS
Start: 2024-12-18

## 2024-06-21 RX ORDER — HEPARIN SODIUM,PORCINE 10 UNIT/ML
5-20 VIAL (ML) INTRAVENOUS DAILY PRN
OUTPATIENT
Start: 2024-12-18

## 2024-06-21 RX ORDER — METHYLPREDNISOLONE SODIUM SUCCINATE 125 MG/2ML
125 INJECTION, POWDER, LYOPHILIZED, FOR SOLUTION INTRAMUSCULAR; INTRAVENOUS
Start: 2024-12-18

## 2024-06-21 RX ORDER — HEPARIN SODIUM (PORCINE) LOCK FLUSH IV SOLN 100 UNIT/ML 100 UNIT/ML
5 SOLUTION INTRAVENOUS
OUTPATIENT
Start: 2024-12-18

## 2024-06-21 RX ORDER — ALBUTEROL SULFATE 0.83 MG/ML
2.5 SOLUTION RESPIRATORY (INHALATION)
OUTPATIENT
Start: 2024-12-18

## 2024-06-21 RX ORDER — DIPHENHYDRAMINE HCL 50 MG
50 CAPSULE ORAL ONCE
OUTPATIENT
Start: 2024-12-18

## 2024-06-21 RX ORDER — ALBUTEROL SULFATE 90 UG/1
1-2 AEROSOL, METERED RESPIRATORY (INHALATION)
Start: 2024-12-18

## 2024-06-21 RX ORDER — METHYLPREDNISOLONE SODIUM SUCCINATE 125 MG/2ML
125 INJECTION, POWDER, LYOPHILIZED, FOR SOLUTION INTRAMUSCULAR; INTRAVENOUS ONCE
OUTPATIENT
Start: 2024-12-18

## 2024-06-24 NOTE — PROGRESS NOTES
Prescriber orders for Ocrevus have been signed and faxed back at [phone].  Robert St EMT June 24, 2024

## 2024-06-26 ENCOUNTER — MEDICAL CORRESPONDENCE (OUTPATIENT)
Dept: HEALTH INFORMATION MANAGEMENT | Facility: CLINIC | Age: 44
End: 2024-06-26
Payer: COMMERCIAL

## 2024-06-26 ENCOUNTER — DOCUMENTATION ONLY (OUTPATIENT)
Dept: NEUROLOGY | Facility: CLINIC | Age: 44
End: 2024-06-26
Payer: COMMERCIAL

## 2024-06-26 NOTE — PROGRESS NOTES
Care plan/treatment orders have been received from [facility], orders placed in Dr. Mchugh's folder for review and signature.   Robert St EMT June 26, 2024

## 2024-08-16 ENCOUNTER — HOSPITAL ENCOUNTER (OUTPATIENT)
Dept: MRI IMAGING | Facility: HOSPITAL | Age: 44
Discharge: HOME | End: 2024-08-16
Attending: PSYCHIATRY & NEUROLOGY
Payer: COMMERCIAL

## 2024-08-16 DIAGNOSIS — G35 MULTIPLE SCLEROSIS (H): ICD-10-CM

## 2024-08-16 PROCEDURE — 72156 MRI NECK SPINE W/O & W/DYE: CPT

## 2024-08-16 PROCEDURE — 70553 MRI BRAIN STEM W/O & W/DYE: CPT

## 2024-08-16 PROCEDURE — 255N000002 HC RX 255 OP 636: Performed by: PSYCHIATRY & NEUROLOGY

## 2024-08-16 PROCEDURE — A9585 GADOBUTROL INJECTION: HCPCS | Performed by: PSYCHIATRY & NEUROLOGY

## 2024-08-16 RX ORDER — GADOBUTROL 604.72 MG/ML
12 INJECTION INTRAVENOUS ONCE
Status: COMPLETED | OUTPATIENT
Start: 2024-08-16 | End: 2024-08-16

## 2024-08-16 RX ADMIN — GADOBUTROL 12 ML: 604.72 INJECTION INTRAVENOUS at 17:16

## 2024-08-21 ENCOUNTER — OFFICE VISIT (OUTPATIENT)
Dept: NEUROLOGY | Facility: CLINIC | Age: 44
End: 2024-08-21
Attending: PSYCHIATRY & NEUROLOGY
Payer: COMMERCIAL

## 2024-08-21 VITALS
OXYGEN SATURATION: 94 % | BODY MASS INDEX: 32.64 KG/M2 | HEART RATE: 69 BPM | SYSTOLIC BLOOD PRESSURE: 116 MMHG | WEIGHT: 252.5 LBS | DIASTOLIC BLOOD PRESSURE: 77 MMHG

## 2024-08-21 DIAGNOSIS — G35 MS (MULTIPLE SCLEROSIS) (H): Primary | ICD-10-CM

## 2024-08-21 DIAGNOSIS — Z51.81 THERAPEUTIC DRUG MONITORING: ICD-10-CM

## 2024-08-21 PROCEDURE — 99214 OFFICE O/P EST MOD 30 MIN: CPT | Performed by: PSYCHIATRY & NEUROLOGY

## 2024-08-21 ASSESSMENT — PAIN SCALES - GENERAL: PAINLEVEL: NO PAIN (0)

## 2024-08-21 NOTE — PROGRESS NOTES
Date of Service: 8/21/2024    [facility] Neurology   MS Clinic Evaluation    Subjective: 43-year-old otherwise healthy man who presents for evaluation of multiple sclerosis.    He notes that vision from his right eye remains poor.  He has multiple blurry spots.  It is as if he is looking through dirty glasses.  He is still able to make out shapes, but has difficulty reading with the right eye alone.  Overcast days where there is a lot of glare is particularly challenging.  He notes that his right eye does not just as well to the changes in lighting.    He has not otherwise noticed any new symptoms related to multiple sclerosis.    He has not struggled with any major infections.    His last dose of Ocrevus was administered in June.  He tolerated this with without substantial side effects.  He did experience a slight itchy scalp which was manageable with slowing of the infusion rate.    Disease onset: age 43, R ON  Last relapse: same    DMD hx:   Ocrevus 12/8/23-present< LD 6/21/2024    No Known Allergies    Current Outpatient Medications   Medication Sig Dispense Refill    ocrelizumab (OCREVUS) 300 MG/10ML SOLN injection Inject 20 mLs (600 mg) into the vein every 6 months      vitamin D3 (CHOLECALCIFEROL) 1.25 MG (40333 UT) capsule Take 1 capsule (50,000 Units) by mouth once a week 12 capsule 3     No current facility-administered medications for this visit.        Past medical, surgical, social and family history was personally reviewed. Pertinent details noted above.     Physical Examination:   /77 (BP Location: Left arm, Patient Position: Sitting, Cuff Size: Adult Regular)   Pulse 69   Wt 114.5 kg (252 lb 8 oz)   SpO2 94%   BMI 32.64 kg/m      General: no acute distress  Cranial nerves:   VFFC  PERRL w/sl R RAPD  EOM full w/no LELIA   Face symmetric  Hearing intact  No dysarthria   Motor:   Tone is normal   Bulk is normal                           R          L  Deltoid             5          5  Biceps              5          5  Triceps  5 5  Wrist ext 5 5  Finger ext 5 5  Finger abd 5 5     Hip flexion 5 5  Knee flexion 5 5  Knee ext 5 5  Ankle d/f 5 5     Reflexes: 2+ and symmetric throughout, babinski absent bilaterally  Sensory: vibration is mildly reduced in the toes, JPS normal in the toes   Romberg is absent  Coordination: no ataxia or dysmetria  Gait: normal base and stride, tandem gait is intact, able to balance on one foot and hop x 5 bilaterally, able to get up from chair with single leg    Tests/Imaging:   CSF 7 ocb     Aqp4, mog neg    B12 1156  Vitamin D 20-> 62  JCV Ab 0.21    Igg 785-> 929  ALC 1700-> 1400  Cd19 43    MRI Brain  10/2023 - moderate to severe lesion burden with mix of periventricular lesions, deep white matter and juxtacortical lesions, R ON gd+  8/2024 - no new lesions, gd-     MRI Cervical spine   10/2023 - 1-2 small lesions, gd-   8/2024 - no new lesions    MRI Thoracic spine   10/2023 - no definite lesions     Assessment: 43-year-old man with relapsing remitting multiple sclerosis is clinically and radiologically stable on ocrelizumab.  He is tolerating his infusions with minor side effects.    It is reasonable for him to continue with Ocrevus every 6 months.  Blood work should be done on the day of infusion to monitor for hematologic or immunologic toxicity.    We discussed timing of vaccinations.  I also mention the option of pemgarda, but noted that this treatment is not encouraged unless he were to suffer a significant COVID illness.  Rationale was discussed.    Plan:   -Continue Ocrevus  - Blood work on the day of medication  - Follow-up in 6 months    Note was completed with the assistance of Dragon Fluency software which can often result in accidental word substitutions.     A total of 30 minutes on the date of service were spent in the care of this patient.   Jael Mchugh MD on 8/21/2024 at 8:56 AM

## 2024-08-21 NOTE — LETTER
8/21/2024       RE: Barrington Cárdenas  [address] [facility] [place] [address]     Dear Colleague,    Thank you for referring your patient, Barrington Cárdenas, to the [facility] MULTIPLE SCLEROSIS CLINIC [place] at [facility]. Please see a copy of my visit note below.    Date of Service: 8/21/2024    [facility] Neurology   MS Clinic Evaluation    Subjective: 43-year-old otherwise healthy man who presents for evaluation of multiple sclerosis.    He notes that vision from his right eye remains poor.  He has multiple blurry spots.  It is as if he is looking through dirty glasses.  He is still able to make out shapes, but has difficulty reading with the right eye alone.  Overcast days where there is a lot of glare is particularly challenging.  He notes that his right eye does not just as well to the changes in lighting.    He has not otherwise noticed any new symptoms related to multiple sclerosis.    He has not struggled with any major infections.    His last dose of Ocrevus was administered in June.  He tolerated this with without substantial side effects.  He did experience a slight itchy scalp which was manageable with slowing of the infusion rate.    Disease onset: age 43, R ON  Last relapse: same    DMD hx:   Ocrevus 12/8/23-present< LD 6/21/2024    No Known Allergies    Current Outpatient Medications   Medication Sig Dispense Refill     ocrelizumab (OCREVUS) 300 MG/10ML SOLN injection Inject 20 mLs (600 mg) into the vein every 6 months       vitamin D3 (CHOLECALCIFEROL) 1.25 MG (26922 UT) capsule Take 1 capsule (50,000 Units) by mouth once a week 12 capsule 3     No current facility-administered medications for this visit.        Past medical, surgical, social and family history was personally reviewed. Pertinent details noted above.     Physical Examination:   /77 (BP Location: Left arm, Patient Position: Sitting, Cuff Size: Adult Regular)   Pulse 69    Wt 114.5 kg (252 lb 8 oz)   SpO2 94%   BMI 32.64 kg/m      General: no acute distress  Cranial nerves:   VFFC  PERRL w/sl R RAPD  EOM full w/no LELIA   Face symmetric  Hearing intact  No dysarthria   Motor:   Tone is normal   Bulk is normal                           R          L  Deltoid             5          5  Biceps             5          5  Triceps  5 5  Wrist ext 5 5  Finger ext 5 5  Finger abd 5 5     Hip flexion 5 5  Knee flexion 5 5  Knee ext 5 5  Ankle d/f 5 5     Reflexes: 2+ and symmetric throughout, babinski absent bilaterally  Sensory: vibration is mildly reduced in the toes, JPS normal in the toes   Romberg is absent  Coordination: no ataxia or dysmetria  Gait: normal base and stride, tandem gait is intact, able to balance on one foot and hop x 5 bilaterally, able to get up from chair with single leg    Tests/Imaging:   CSF 7 ocb     Aqp4, mog neg    B12 1156  Vitamin D 20-> 62  JCV Ab 0.21    Igg 785-> 929  ALC 1700-> 1400  Cd19 43    MRI Brain  10/2023 - moderate to severe lesion burden with mix of periventricular lesions, deep white matter and juxtacortical lesions, R ON gd+  8/2024 - no new lesions, gd-     MRI Cervical spine   10/2023 - 1-2 small lesions, gd-   8/2024 - no new lesions    MRI Thoracic spine   10/2023 - no definite lesions     Assessment: 43-year-old man with relapsing remitting multiple sclerosis is clinically and radiologically stable on ocrelizumab.  He is tolerating his infusions with minor side effects.    It is reasonable for him to continue with Ocrevus every 6 months.  Blood work should be done on the day of infusion to monitor for hematologic or immunologic toxicity.    We discussed timing of vaccinations.  I also mention the option of pemgarda, but noted that this treatment is not encouraged unless he were to suffer a significant COVID illness.  Rationale was discussed.    Plan:   -Continue Ocrevus  - Blood work on the day of medication  - Follow-up in 6 months    Note  was completed with the assistance of Dragon Fluency software which can often result in accidental word substitutions.     A total of 30 minutes on the date of service were spent in the care of this patient.   Jael Mchugh MD on 8/21/2024 at 8:56 AM            Again, thank you for allowing me to participate in the care of your patient.      Sincerely,    Jael Mchugh MD

## 2024-08-21 NOTE — PATIENT INSTRUCTIONS
Continue ocrevus   Due in December     Blood work on the day of the infusion     Follow up in 6 months

## 2024-08-21 NOTE — NURSING NOTE
Chief Complaint   Patient presents with    MS    RECHECK     MS follow up      Vitals were taken and medications were reconciled.   Robert St, EMT  8:53 AM

## 2024-08-26 ENCOUNTER — DOCUMENTATION ONLY (OUTPATIENT)
Dept: NEUROLOGY | Facility: CLINIC | Age: 44
End: 2024-08-26
Payer: COMMERCIAL

## 2024-08-26 NOTE — PROGRESS NOTES
Authorization for MRI scans have been received from [facility], approval valid from 08/08/2024 through 10/06/2024.  Robert St EMT August 26, 2024

## 2024-09-05 ENCOUNTER — ENROLLMENT (OUTPATIENT)
Dept: HOME HEALTH SERVICES | Facility: HOME HEALTH | Age: 44
End: 2024-09-05
Payer: COMMERCIAL
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
## 2024-10-01 PROBLEM — E66.9 OBESITY, UNSPECIFIED: Status: RESOLVED | Noted: 2019-01-07 | Resolved: 2023-11-01

## 2024-10-09 ENCOUNTER — HOME INFUSION (OUTPATIENT)
Dept: HOME HEALTH SERVICES | Facility: HOME HEALTH | Age: 44
End: 2024-10-09
Payer: COMMERCIAL

## 2024-11-04 ENCOUNTER — VIRTUAL VISIT (OUTPATIENT)
Dept: PHARMACY | Facility: CLINIC | Age: 44
End: 2024-11-04
Attending: PSYCHIATRY & NEUROLOGY
Payer: COMMERCIAL

## 2024-11-04 DIAGNOSIS — G35 MULTIPLE SCLEROSIS (H): Primary | ICD-10-CM

## 2024-11-04 NOTE — PROGRESS NOTES
Medication Therapy Management (MTM) Encounter    ASSESSMENT:                            Medication Adherence/Access: No issues identified.    MS:  Tolerating infusions with a minor itching that was controlled with slowing the rate. Recommend patient continue to infuse with [facility] in-suites and would not recommend switching to in-home infusions at this time. Discussed healthy sleep habits and possible benefits of melatonin. All questions answered.     PLAN:                            Medication list updated and reviewed   Melatonin is an over-the-counter sleep aid that may be helpful with keeping your asleep at night. Typically, doses start at about 3-5mg at bedtime as needed     Follow-up:   Appointments in Next Year      Feb 10, 2025 9:30 AM  (Arrive by 9:15 AM)  Return MS with Jael Mchugh MD  [facility] ([facility] ) [phone]     May 05, 2025 8:00 AM  Pharmacist Visit with Cris Dawson RPH  [facility] MTM ([facility] [place] ) [phone]          SUBJECTIVE/OBJECTIVE:                          Andrey Cárdenas is a 44 year old male seen for a follow-up visit.       Reason for visit: Ocrevus Follow Up.    Allergies/ADRs: Reviewed in chart  Past Medical History: Reviewed in chart  Tobacco: He reports that he has never smoked. He quit smokeless tobacco use about 19 years ago.  His smokeless tobacco use included chew.  Alcohol: 4-6 beverages / week    Medication Adherence/Access: no issues reported.    MS:   - Ocrevus 600mg every 6 month. Last infusion was 6/21/24 at [facility]. He did have some itching on his scalp and mucus membranes. He notes this was minor and resolved with slowing the rate.   - Vitamin D3 21179 units once weekly on Fridays     Patient is wondering about how strict insurance is with allowing for infusions every 5 month as he is thinking he may need to switch to a more  frequent infusion schedule.     He has been not been sleeping well at night. He is waking up at night and unable to fall back asleep. This is new for him. He is wondering if there is anything that he can do to help improve his sleep.     Today's Vitals: There were no vitals taken for this visit.  ----------------    I spent 12 minutes with this patient today. All changes were made via collaborative practice agreement with Jael Mchugh. A copy of the visit note was provided to the patient's provider(s).    A summary of these recommendations was sent via [employer].    Cris Dawson, Pharm.D., MPH  Medication Therapy Management Pharmacist   [facility]    Telemedicine Visit Details  The patient's medications can be safely assessed via a telemedicine encounter.  Type of service:  Telephone visit  Originating Location (pt. Location): Home    Distant Location (provider location):  Off-site  Start Time: 8:03 AM  End Time:  8:15 AM     Medication Therapy Recommendations  No medication therapy recommendations to display

## 2024-11-04 NOTE — PATIENT INSTRUCTIONS
"Recommendations from today's MTM visit:                                                      Medication list updated and reviewed   Melatonin is an over-the-counter sleep aid that may be helpful with keeping your asleep at night. Typically, doses start at about 3-5mg at bedtime as needed     Follow-up:   Appointments in Next Year      Feb 10, 2025 9:30 AM  (Arrive by 9:15 AM)  Return MS with Jael Mchugh MD  [facility] ([facility] ) [phone]     May 05, 2025 8:00 AM  [facility] ([facility] [place] ) [phone]            It was great speaking with you today.  I value your experience and would be very thankful for your time in providing feedback in our clinic survey. In the next few days, you may receive an email or text message from [facility] with a link to a survey related to your  clinical pharmacist.\"     To schedule another MTM appointment, please call the clinic directly or you may call the MTM scheduling line at [phone].    My Clinical Pharmacist's contact information:                                                      Please feel free to contact me with any questions or concerns you have.      Cris Dawson, Pharm.D., MPH  Medication Therapy Management Pharmacist   [facility]   "

## 2024-11-21 ENCOUNTER — TRANSFERRED RECORDS (OUTPATIENT)
Dept: HEALTH INFORMATION MANAGEMENT | Facility: CLINIC | Age: 44
End: 2024-11-21
Payer: COMMERCIAL

## 2024-12-16 ENCOUNTER — OFFICE VISIT (OUTPATIENT)
Dept: FAMILY MEDICINE | Facility: CLINIC | Age: 44
End: 2024-12-16
Payer: COMMERCIAL

## 2024-12-16 VITALS
RESPIRATION RATE: 16 BRPM | WEIGHT: 256.6 LBS | HEART RATE: 81 BPM | DIASTOLIC BLOOD PRESSURE: 60 MMHG | OXYGEN SATURATION: 97 % | HEIGHT: 74 IN | BODY MASS INDEX: 32.93 KG/M2 | SYSTOLIC BLOOD PRESSURE: 118 MMHG

## 2024-12-16 DIAGNOSIS — E55.9 VITAMIN D DEFICIENCY: ICD-10-CM

## 2024-12-16 DIAGNOSIS — E66.811 OBESITY (BMI 30.0-34.9): ICD-10-CM

## 2024-12-16 DIAGNOSIS — G35 MULTIPLE SCLEROSIS (H): ICD-10-CM

## 2024-12-16 DIAGNOSIS — Z00.00 ROUTINE GENERAL MEDICAL EXAMINATION AT A HEALTH CARE FACILITY: Primary | ICD-10-CM

## 2024-12-16 DIAGNOSIS — Z12.11 SCREEN FOR COLON CANCER: ICD-10-CM

## 2024-12-16 DIAGNOSIS — H46.9 OPTIC NEURITIS: ICD-10-CM

## 2024-12-16 LAB
EST. AVERAGE GLUCOSE BLD GHB EST-MCNC: 105 MG/DL
HBA1C MFR BLD: 5.3 % (ref 0–5.6)

## 2024-12-16 PROCEDURE — 36415 COLL VENOUS BLD VENIPUNCTURE: CPT | Performed by: FAMILY MEDICINE

## 2024-12-16 PROCEDURE — 80053 COMPREHEN METABOLIC PANEL: CPT | Performed by: FAMILY MEDICINE

## 2024-12-16 PROCEDURE — 80061 LIPID PANEL: CPT | Performed by: FAMILY MEDICINE

## 2024-12-16 PROCEDURE — 99396 PREV VISIT EST AGE 40-64: CPT | Performed by: FAMILY MEDICINE

## 2024-12-16 PROCEDURE — 82306 VITAMIN D 25 HYDROXY: CPT | Performed by: FAMILY MEDICINE

## 2024-12-16 PROCEDURE — 99213 OFFICE O/P EST LOW 20 MIN: CPT | Mod: 25 | Performed by: FAMILY MEDICINE

## 2024-12-16 PROCEDURE — 83036 HEMOGLOBIN GLYCOSYLATED A1C: CPT | Performed by: FAMILY MEDICINE

## 2024-12-16 SDOH — HEALTH STABILITY: PHYSICAL HEALTH: ON AVERAGE, HOW MANY DAYS PER WEEK DO YOU ENGAGE IN MODERATE TO STRENUOUS EXERCISE (LIKE A BRISK WALK)?: 3 DAYS

## 2024-12-16 ASSESSMENT — SOCIAL DETERMINANTS OF HEALTH (SDOH): HOW OFTEN DO YOU GET TOGETHER WITH FRIENDS OR RELATIVES?: TWICE A WEEK

## 2024-12-16 NOTE — PATIENT INSTRUCTIONS
Patient Education   Preventive Care Advice   This is general advice given by our system to help you stay healthy. However, your care team may have specific advice just for you. Please talk to your care team about your preventive care needs.  Nutrition  Eat 5 or more servings of fruits and vegetables each day.  Try wheat bread, brown rice and whole grain pasta (instead of white bread, rice, and pasta).  Get enough calcium and vitamin D. Check the label on foods and aim for 100% of the RDA (recommended daily allowance).  Lifestyle  Exercise at least 150 minutes each week  (30 minutes a day, 5 days a week).  Do muscle strengthening activities 2 days a week. These help control your weight and prevent disease.  No smoking.  Wear sunscreen to prevent skin cancer.  Have a dental exam and cleaning every 6 months.  Yearly exams  See your health care team every year to talk about:  Any changes in your health.  Any medicines your care team has prescribed.  Preventive care, family planning, and ways to prevent chronic diseases.  Shots (vaccines)   HPV shots (up to age 26), if you've never had them before.  Hepatitis B shots (up to age 59), if you've never had them before.  COVID-19 shot: Get this shot when it's due.  Flu shot: Get a flu shot every year.  Tetanus shot: Get a tetanus shot every 10 years.  Pneumococcal, hepatitis A, and RSV shots: Ask your care team if you need these based on your risk.  Shingles shot (for age 50 and up)  General health tests  Diabetes screening:  Starting at age 35, Get screened for diabetes at least every 3 years.  If you are younger than age 35, ask your care team if you should be screened for diabetes.  Cholesterol test: At age 39, start having a cholesterol test every 5 years, or more often if advised.  Bone density scan (DEXA): At age 50, ask your care team if you should have this scan for osteoporosis (brittle bones).  Hepatitis C: Get tested at least once in your life.  STIs (sexually  transmitted infections)  Before age 24: Ask your care team if you should be screened for STIs.  After age 24: Get screened for STIs if you're at risk. You are at risk for STIs (including HIV) if:  You are sexually active with more than one person.  You don't use condoms every time.  You or a partner was diagnosed with a sexually transmitted infection.  If you are at risk for HIV, ask about PrEP medicine to prevent HIV.  Get tested for HIV at least once in your life, whether you are at risk for HIV or not.  Cancer screening tests  Cervical cancer screening: If you have a cervix, begin getting regular cervical cancer screening tests starting at age 21.  Breast cancer scan (mammogram): If you've ever had breasts, begin having regular mammograms starting at age 40. This is a scan to check for breast cancer.  Colon cancer screening: It is important to start screening for colon cancer at age 45.  Have a colonoscopy test every 10 years (or more often if you're at risk) Or, ask your provider about stool tests like a FIT test every year or Cologuard test every 3 years.  To learn more about your testing options, visit:   .  For help making a decision, visit:   https://bit.ly/un84873.  Prostate cancer screening test: If you have a prostate, ask your care team if a prostate cancer screening test (PSA) at age 55 is right for you.  Lung cancer screening: If you are a current or former smoker ages 50 to 80, ask your care team if ongoing lung cancer screenings are right for you.  For informational purposes only. Not to replace the advice of your health care provider. Copyright   2023 [employer]. All rights reserved. Clinically reviewed by the [facility] Transitions Program. [employer] 704593 - REV 01/24.

## 2024-12-16 NOTE — PROGRESS NOTES
"Preventive Care Visit  [facility]  Earle Lindsay MD, Family Medicine  Dec 16, 2024      Assessment & Plan     ICD-10-CM    1. Routine general medical examination at a health care facility  Z00.00 Comprehensive metabolic panel (BMP + Alb, Alk Phos, ALT, AST, Total. Bili, TP)     Hemoglobin A1c     Lipid panel reflex to direct LDL Fasting     Comprehensive metabolic panel (BMP + Alb, Alk Phos, ALT, AST, Total. Bili, TP)     Hemoglobin A1c     Lipid panel reflex to direct LDL Fasting      2. Vitamin D deficiency  E55.9 vitamin D3 (CHOLECALCIFEROL) 1.25 MG (69278 UT) capsule     Vitamin D Deficiency     Vitamin D Deficiency      3. Screen for colon cancer  Z12.11 Colonoscopy Screening  Referral      4. Obesity (BMI 30.0-34.9)  E66.811       5. Multiple sclerosis (H)  G35       6. Optic neuritis  H46.9         Patient with multiple sclerosis and optic neuritis presents today for routine physical exam.  Following discussed  1: Vitamin D deficiency: Due to MS, he is on high-dose vitamin D.  We will recheck this today.  2: Obesity: Discussed BMI.  He is fairly active with playing ice hockey 2-3 times a week for cardio exercises.  Discussed calorie restriction for weight loss.  Patient verbalizes understanding.  3: Multiple sclerosis: Follows with neurology.  Reviewed visit from October 2024.  He is clinically and radiologically stable on ocrelizumab.  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  Now stable and evaluated by ophthalmology last month.      BMI  Estimated body mass index is 32.95 kg/m  as calculated from the following:    Height as of this encounter: 1.88 m (6' 2\").    Weight as of this encounter: 116.4 kg (256 lb 9.6 oz).   Weight management plan: Discussed healthy diet and exercise guidelines    Counseling  Appropriate preventive services were addressed with this patient via screening, questionnaire, or discussion as " appropriate for fall prevention, nutrition, physical activity, Tobacco-use cessation, social engagement, weight loss and cognition.  Checklist reviewing preventive services available has been given to the patient.  Reviewed patient's diet, addressing concerns and/or questions.   He is at risk for lack of exercise and has been provided with information to increase physical activity for the benefit of his well-being.   The patient was instructed to see the dentist every 6 months.       MEDICATIONS:  Continue current medications without change  Work on weight loss  Regular exercise  See Patient Instructions    Subjective   Andrey is a 44 year old, presenting for the following:  Physical (Pt is NOT fasting today, check in- touch base.)        12/16/2024    11:32 AM   Additional Questions   Roomed by Kimberly Hernandez CMA          HPI    Patient Active Problem List   Diagnosis    Optic neuritis    Multiple sclerosis (H)    Obesity (BMI 30.0-34.9)    Vitamin D deficiency     Current Outpatient Medications   Medication Sig Dispense Refill    vitamin D3 (CHOLECALCIFEROL) 1.25 MG (53631 UT) capsule Take 1 capsule (50,000 Units) by mouth once a week. 12 capsule 3    acetaminophen (TYLENOL) 325 MG tablet Take 2 tablets (650 mg) by mouth every 6 months. Administer 30 minutes prior to infusion (Patient not taking: Reported on 12/16/2024) 2 tablet 0    diphenhydrAMINE (BENADRYL) 25 MG capsule Take 2 capsules (50 mg) by mouth every 6 months. Administer 30 minutes prior to infusion (Patient not taking: Reported on 12/16/2024) 2 capsule 0    methylPREDNISolone Na Suc (solu-MEDROL) 125 mg in sodium chloride 0.9 % 12.5 mL injection Inject 125 mg over 3-5 minutes into the vein via push every 6 months. Administer 30 minutes prior to infusion 05606 mL 0    ocrelizumab (OCREVUS) 300 MG/10ML SOLN injection Inject 20 mLs (600 mg) into the vein every 6 months      ocrelizumab (OCREVUS) 600 mg in sodium chloride 0.9 % 500 mL via CADD pump Infuse 600  mg over 3.5 hours into the vein every 6 months. Allow bag to come to room temperature. Continuous rate: 40 mL/hr x30 min, 80 mL/hr x30 min, 120 mL/hr x30 min, 160 mL/hr x30 min, 200 mL/hr until complete. Flush bag with 20 mL NS to flush tubing.  Do not shake. 280758 mL 0    sodium chloride, PF, 0.9% PF flush Inject 10 mLs into the vein as needed for line flush. Flush IV before and after medication administration as directed and/or at least every 12 hours. 936809 mL 0     No current facility-administered medications for this visit.       Health Care Directive  Patient does not have a Health Care Directive: Discussed advance care planning with patient; however, patient declined at this time.      12/16/2024   General Health   How would you rate your overall physical health? Good   Feel stress (tense, anxious, or unable to sleep) Not at all            12/16/2024   Nutrition   Three or more servings of calcium each day? Yes   Diet: Regular (no restrictions)   How many servings of fruit and vegetables per day? 4 or more   How many sweetened beverages each day? 0-1            12/16/2024   Exercise   Days per week of moderate/strenous exercise 3 days            12/16/2024   Social Factors   Frequency of gathering with friends or relatives Twice a week   Worry food won't last until get money to buy more No   Food not last or not have enough money for food? No   Do you have housing? (Housing is defined as stable permanent housing and does not include staying ouside in a car, in a tent, in an abandoned building, in an overnight shelter, or couch-surfing.) Yes   Are you worried about losing your housing? No   Lack of transportation? No   Unable to get utilities (heat,electricity)? No            12/16/2024   Dental   Dentist two times every year? (!) NO                 Today's PHQ-2 Score:       8/21/2024     8:53 AM   PHQ-2 ( 1999 [employer])   Q1: Little interest or pleasure in doing things 0   Q2: Feeling down, depressed or  hopeless 0   PHQ-2 Score 0         12/16/2024   Substance Use   Alcohol more than 3/day or more than 7/wk No   Do you use any other substances recreationally? No        Social History     Tobacco Use    Smoking status: Never    Smokeless tobacco: Former     Types: Chew     Quit date: 8/10/2005    Tobacco comments:     Never smoker   Vaping Use    Vaping status: Never Used   Substance Use Topics    Alcohol use: Yes     Comment: socially, 4-6/ week    Drug use: No           12/16/2024   STI Screening   New sexual partner(s) since last STI/HIV test? No      ASCVD Risk   The 10-year ASCVD risk score (Aidan BATRES, et al., 2019) is: 1.1%    Values used to calculate the score:      Age: 44 years      Sex: Male      Is Non- : No      Diabetic: No      Tobacco smoker: No      Systolic Blood Pressure: 118 mmHg      Is BP treated: No      HDL Cholesterol: 50 mg/dL      Total Cholesterol: 149 mg/dL        12/16/2024   Contraception/Family Planning   Questions about contraception or family planning No           Reviewed and updated as needed this visit by Provider   Tobacco  Allergies  Meds  Problems  Med Hx  Surg Hx  Fam Hx            Past Medical History:   Diagnosis Date    [place]'s esophagus 01/01/2015     Past Surgical History:   Procedure Laterality Date    SURGICAL HISTORY OF -       Tonsils, age 3    TONSILLECTOMY       BP Readings from Last 3 Encounters:   12/16/24 118/60   08/21/24 116/77   02/16/24 (!) 142/83    Wt Readings from Last 3 Encounters:   12/16/24 116.4 kg (256 lb 9.6 oz)   10/24/24 114.9 kg (253 lb 3.2 oz)   08/21/24 114.5 kg (252 lb 8 oz)                  Patient Active Problem List   Diagnosis    Optic neuritis    Multiple sclerosis (H)    Obesity (BMI 30.0-34.9)    Vitamin D deficiency     Past Surgical History:   Procedure Laterality Date    SURGICAL HISTORY OF -       Tonsils, age 3    TONSILLECTOMY         Social History     Tobacco Use    Smoking status: Never     Smokeless tobacco: Former     Types: Chew     Quit date: 8/10/2005    Tobacco comments:     Never smoker   Substance Use Topics    Alcohol use: Yes     Comment: socially, 4-6/ week     Family History   Problem Relation Age of Onset    Unknown/Adopted Other         Self         Current Outpatient Medications   Medication Sig Dispense Refill    vitamin D3 (CHOLECALCIFEROL) 1.25 MG (17380 UT) capsule Take 1 capsule (50,000 Units) by mouth once a week. 12 capsule 3    acetaminophen (TYLENOL) 325 MG tablet Take 2 tablets (650 mg) by mouth every 6 months. Administer 30 minutes prior to infusion (Patient not taking: Reported on 12/16/2024) 2 tablet 0    diphenhydrAMINE (BENADRYL) 25 MG capsule Take 2 capsules (50 mg) by mouth every 6 months. Administer 30 minutes prior to infusion (Patient not taking: Reported on 12/16/2024) 2 capsule 0    methylPREDNISolone Na Suc (solu-MEDROL) 125 mg in sodium chloride 0.9 % 12.5 mL injection Inject 125 mg over 3-5 minutes into the vein via push every 6 months. Administer 30 minutes prior to infusion 31974 mL 0    ocrelizumab (OCREVUS) 300 MG/10ML SOLN injection Inject 20 mLs (600 mg) into the vein every 6 months      ocrelizumab (OCREVUS) 600 mg in sodium chloride 0.9 % 500 mL via CADD pump Infuse 600 mg over 3.5 hours into the vein every 6 months. Allow bag to come to room temperature. Continuous rate: 40 mL/hr x30 min, 80 mL/hr x30 min, 120 mL/hr x30 min, 160 mL/hr x30 min, 200 mL/hr until complete. Flush bag with 20 mL NS to flush tubing.  Do not shake. 762542 mL 0    sodium chloride, PF, 0.9% PF flush Inject 10 mLs into the vein as needed for line flush. Flush IV before and after medication administration as directed and/or at least every 12 hours. 555953 mL 0         Review of Systems  Constitutional, neuro, ENT, endocrine, pulmonary, cardiac, gastrointestinal, genitourinary, musculoskeletal, integument and psychiatric systems are negative, except as otherwise noted.    "  Objective    Exam  /60   Pulse 81   Resp 16   Ht 1.88 m (6' 2\")   Wt 116.4 kg (256 lb 9.6 oz)   SpO2 97%   BMI 32.95 kg/m     Estimated body mass index is 32.95 kg/m  as calculated from the following:    Height as of this encounter: 1.88 m (6' 2\").    Weight as of this encounter: 116.4 kg (256 lb 9.6 oz).    Physical Exam  GENERAL: alert and no distress  NECK: no adenopathy, no asymmetry, masses, or scars  RESP: lungs clear to auscultation - no rales, rhonchi or wheezes  CV: regular rate and rhythm, normal S1 S2, no S3 or S4, no murmur, click or rub, no peripheral edema  ABDOMEN: soft, nontender, no hepatosplenomegaly, no masses and bowel sounds normal  MS: no gross musculoskeletal defects noted, no edema        Signed Electronically by: Earle Lindsay MD    "

## 2024-12-17 LAB
ALBUMIN SERPL BCG-MCNC: 4.5 G/DL (ref 3.5–5.2)
ALP SERPL-CCNC: 62 U/L (ref 40–150)
ALT SERPL W P-5'-P-CCNC: 56 U/L (ref 0–70)
ANION GAP SERPL CALCULATED.3IONS-SCNC: 9 MMOL/L (ref 7–15)
AST SERPL W P-5'-P-CCNC: 31 U/L (ref 0–45)
BILIRUB SERPL-MCNC: 0.9 MG/DL
BUN SERPL-MCNC: 15.4 MG/DL (ref 6–20)
CALCIUM SERPL-MCNC: 9.2 MG/DL (ref 8.8–10.4)
CHLORIDE SERPL-SCNC: 102 MMOL/L (ref 98–107)
CHOLEST SERPL-MCNC: 198 MG/DL
CREAT SERPL-MCNC: 1 MG/DL (ref 0.67–1.17)
EGFRCR SERPLBLD CKD-EPI 2021: >90 ML/MIN/1.73M2
FASTING STATUS PATIENT QL REPORTED: NO
FASTING STATUS PATIENT QL REPORTED: NO
GLUCOSE SERPL-MCNC: 105 MG/DL (ref 70–99)
HCO3 SERPL-SCNC: 27 MMOL/L (ref 22–29)
HDLC SERPL-MCNC: 49 MG/DL
LDLC SERPL CALC-MCNC: 106 MG/DL
NONHDLC SERPL-MCNC: 149 MG/DL
POTASSIUM SERPL-SCNC: 4.7 MMOL/L (ref 3.4–5.3)
PROT SERPL-MCNC: 7.4 G/DL (ref 6.4–8.3)
SODIUM SERPL-SCNC: 138 MMOL/L (ref 135–145)
TRIGL SERPL-MCNC: 214 MG/DL
VIT D+METAB SERPL-MCNC: 65 NG/ML (ref 20–50)

## 2024-12-27 ENCOUNTER — HOME INFUSION (OUTPATIENT)
Dept: HOME HEALTH SERVICES | Facility: HOME HEALTH | Age: 44
End: 2024-12-27
Payer: COMMERCIAL

## 2024-12-28 ENCOUNTER — MYC MEDICAL ADVICE (OUTPATIENT)
Dept: FAMILY MEDICINE | Facility: CLINIC | Age: 44
End: 2024-12-28
Payer: COMMERCIAL

## 2024-12-28 DIAGNOSIS — Z20.828 EXPOSURE TO INFLUENZA: Primary | ICD-10-CM

## 2024-12-28 RX ORDER — OSELTAMIVIR PHOSPHATE 75 MG/1
75 CAPSULE ORAL DAILY
Qty: 7 CAPSULE | Refills: 0 | Status: SHIPPED | OUTPATIENT
Start: 2024-12-28 | End: 2025-01-04

## 2024-12-28 NOTE — TELEPHONE ENCOUNTER
Patient has treatment coming up for MS and daughter was diagnosed today with Influenza A.  I ordered Tamiflu for prevention treatment.  Blanca Arredondo NP on 12/28/2024 at 2:12 PM

## 2024-12-31 ENCOUNTER — HOME INFUSION (OUTPATIENT)
Dept: HOME HEALTH SERVICES | Facility: HOME HEALTH | Age: 44
End: 2024-12-31
Payer: COMMERCIAL

## 2025-01-03 ENCOUNTER — HOME INFUSION BILLING (OUTPATIENT)
Dept: HOME HEALTH SERVICES | Facility: HOME HEALTH | Age: 45
End: 2025-01-03
Payer: COMMERCIAL

## 2025-01-03 ENCOUNTER — LAB REQUISITION (OUTPATIENT)
Dept: LAB | Facility: CLINIC | Age: 45
End: 2025-01-03
Payer: COMMERCIAL

## 2025-01-03 DIAGNOSIS — G35 MULTIPLE SCLEROSIS (H): ICD-10-CM

## 2025-01-03 LAB
BASOPHILS # BLD AUTO: 0.1 10E3/UL (ref 0–0.2)
BASOPHILS NFR BLD AUTO: 1 %
CD19 B CELL COMMENT: ABNORMAL
CD19 CELLS # BLD: 57 CELLS/UL (ref 107–698)
CD19 CELLS NFR BLD: 3 % (ref 6–27)
EOSINOPHIL # BLD AUTO: 0.6 10E3/UL (ref 0–0.7)
EOSINOPHIL NFR BLD AUTO: 8 %
ERYTHROCYTE [DISTWIDTH] IN BLOOD BY AUTOMATED COUNT: 13.2 % (ref 10–15)
HCT VFR BLD AUTO: 43.5 % (ref 40–53)
HGB BLD-MCNC: 15.9 G/DL (ref 13.3–17.7)
IGG SERPL-MCNC: 900 MG/DL (ref 610–1616)
IMM GRANULOCYTES # BLD: 0 10E3/UL
IMM GRANULOCYTES NFR BLD: 0 %
LYMPHOCYTES # BLD AUTO: 1.5 10E3/UL (ref 0.8–5.3)
LYMPHOCYTES NFR BLD AUTO: 22 %
MCH RBC QN AUTO: 31.1 PG (ref 26.5–33)
MCHC RBC AUTO-ENTMCNC: 36.6 G/DL (ref 31.5–36.5)
MCV RBC AUTO: 85 FL (ref 78–100)
MONOCYTES # BLD AUTO: 0.7 10E3/UL (ref 0–1.3)
MONOCYTES NFR BLD AUTO: 10 %
NEUTROPHILS # BLD AUTO: 4.1 10E3/UL (ref 1.6–8.3)
NEUTROPHILS NFR BLD AUTO: 59 %
NRBC # BLD AUTO: 0 10E3/UL
NRBC BLD AUTO-RTO: 0 /100
PLATELET # BLD AUTO: 221 10E3/UL (ref 150–450)
RBC # BLD AUTO: 5.11 10E6/UL (ref 4.4–5.9)
WBC # BLD AUTO: 7 10E3/UL (ref 4–11)

## 2025-01-03 PROCEDURE — S1015 IV TUBING EXTENSION SET: HCPCS

## 2025-01-03 PROCEDURE — 85004 AUTOMATED DIFF WBC COUNT: CPT | Performed by: PSYCHIATRY & NEUROLOGY

## 2025-01-03 PROCEDURE — S9338 HIT IMMUNOTHERAPY DIEM: HCPCS

## 2025-01-03 PROCEDURE — A4215 STERILE NEEDLE: HCPCS

## 2025-01-03 PROCEDURE — 82784 ASSAY IGA/IGD/IGG/IGM EACH: CPT | Performed by: PSYCHIATRY & NEUROLOGY

## 2025-01-03 PROCEDURE — E0781 EXTERNAL AMBULATORY INFUS PU: HCPCS | Mod: RR

## 2025-01-03 PROCEDURE — 86355 B CELLS TOTAL COUNT: CPT | Performed by: PSYCHIATRY & NEUROLOGY

## 2025-01-03 PROCEDURE — 85014 HEMATOCRIT: CPT | Performed by: PSYCHIATRY & NEUROLOGY

## 2025-01-04 PROCEDURE — E0781 EXTERNAL AMBULATORY INFUS PU: HCPCS | Mod: RR

## 2025-01-05 PROCEDURE — E0781 EXTERNAL AMBULATORY INFUS PU: HCPCS | Mod: RR

## 2025-01-06 PROCEDURE — E0781 EXTERNAL AMBULATORY INFUS PU: HCPCS | Mod: RR

## 2025-01-07 PROCEDURE — E0781 EXTERNAL AMBULATORY INFUS PU: HCPCS | Mod: RR

## 2025-01-08 PROCEDURE — E0781 EXTERNAL AMBULATORY INFUS PU: HCPCS | Mod: RR

## 2025-01-09 PROCEDURE — E0781 EXTERNAL AMBULATORY INFUS PU: HCPCS | Mod: RR

## 2025-01-10 PROCEDURE — E0781 EXTERNAL AMBULATORY INFUS PU: HCPCS | Mod: RR

## 2025-01-11 PROCEDURE — E0781 EXTERNAL AMBULATORY INFUS PU: HCPCS | Mod: RR

## 2025-01-12 PROCEDURE — E0781 EXTERNAL AMBULATORY INFUS PU: HCPCS | Mod: RR

## 2025-01-13 PROCEDURE — E0781 EXTERNAL AMBULATORY INFUS PU: HCPCS | Mod: RR

## 2025-01-14 PROCEDURE — E0781 EXTERNAL AMBULATORY INFUS PU: HCPCS | Mod: RR

## 2025-01-15 PROCEDURE — E0781 EXTERNAL AMBULATORY INFUS PU: HCPCS | Mod: RR

## 2025-01-16 PROCEDURE — E0781 EXTERNAL AMBULATORY INFUS PU: HCPCS | Mod: RR

## 2025-01-17 PROCEDURE — E0781 EXTERNAL AMBULATORY INFUS PU: HCPCS | Mod: RR

## 2025-01-18 PROCEDURE — E0781 EXTERNAL AMBULATORY INFUS PU: HCPCS | Mod: RR

## 2025-01-19 PROCEDURE — E0781 EXTERNAL AMBULATORY INFUS PU: HCPCS | Mod: RR

## 2025-01-20 ENCOUNTER — OFFICE VISIT (OUTPATIENT)
Dept: FAMILY MEDICINE | Facility: CLINIC | Age: 45
End: 2025-01-20
Payer: COMMERCIAL

## 2025-01-20 VITALS
HEIGHT: 74 IN | SYSTOLIC BLOOD PRESSURE: 127 MMHG | WEIGHT: 258.6 LBS | BODY MASS INDEX: 33.19 KG/M2 | DIASTOLIC BLOOD PRESSURE: 84 MMHG | HEART RATE: 72 BPM | OXYGEN SATURATION: 98 % | RESPIRATION RATE: 16 BRPM

## 2025-01-20 DIAGNOSIS — R23.4 CRACKED SKIN ON FEET: Primary | ICD-10-CM

## 2025-01-20 DIAGNOSIS — E66.811 OBESITY (BMI 30.0-34.9): ICD-10-CM

## 2025-01-20 DIAGNOSIS — G35 MULTIPLE SCLEROSIS (H): ICD-10-CM

## 2025-01-20 PROCEDURE — E0781 EXTERNAL AMBULATORY INFUS PU: HCPCS | Mod: RR

## 2025-01-20 PROCEDURE — G2211 COMPLEX E/M VISIT ADD ON: HCPCS | Performed by: FAMILY MEDICINE

## 2025-01-20 PROCEDURE — 99213 OFFICE O/P EST LOW 20 MIN: CPT | Performed by: FAMILY MEDICINE

## 2025-01-20 RX ORDER — KETOCONAZOLE 20 MG/G
CREAM TOPICAL DAILY
Qty: 30 G | Refills: 1 | Status: SHIPPED | OUTPATIENT
Start: 2025-01-20 | End: 2025-02-03

## 2025-01-20 NOTE — PROGRESS NOTES
Assessment & Plan     ICD-10-CM    1. Cracked skin on feet  R23.4 ketoconazole (NIZORAL) 2 % external cream      2. Multiple sclerosis (H)  G35       3. Obesity (BMI 30.0-34.9)  E66.811         Noted a dry crack at the MTP joint plantar side of the right foot.  No surrounding erythema or scaliness.  Noted some hardness/callosities.  Trial of ketoconazole.  Will do occlusive therapy for better medication penetration as it is on the plantar side of the foot.  Follow-up if not improving.  Consider superglue versus referral to dermatology.    MS in control with Ocrevus.  Discussed weight.  Diet and lifestyle modification advised.    MEDICATIONS:  Continue current medications without change  See Patient Instructions    The longitudinal plan of care for the diagnosis(es)/condition(s) as documented were addressed during this visit. Due to the added complexity in care, I will continue to support Andrey in the subsequent management and with ongoing continuity of care.      Subjective   Andrey is a 44 year old, presenting for the following health issues:  Foot Problems (Right foot possible athletes foot. Pt has been using OTC stuff- has helped a little but still has it. )        1/20/2025     2:52 PM   Additional Questions   Roomed by Kimberly Hernandez CMA     History of Present Illness       Reason for visit:  Athlete's foot  Symptom onset:  3-4 weeks ago  Symptoms include:  Cracking and redness on pinkie toe on right foot  Symptom intensity:  Moderate  Symptom progression:  Improving  Had these symptoms before:  Yes  Has tried/received treatment for these symptoms:  Yes  Previous treatment was successful:  Yes  Prior treatment description:  Lamisil AT  What makes it worse:  Slow progress  What makes it better:  Slight improvement, but it is taking forever.   He is taking medications regularly.     Patient Active Problem List   Diagnosis    Optic neuritis    Multiple sclerosis (H)    Obesity (BMI 30.0-34.9)    Vitamin D deficiency  "    Current Outpatient Medications   Medication Sig Dispense Refill    acetaminophen (TYLENOL) 325 MG tablet Take 2 tablets (650 mg) by mouth every 6 months. Administer 30 minutes prior to infusion 2 tablet 0    diphenhydrAMINE (BENADRYL) 25 MG capsule Take 2 capsules (50 mg) by mouth every 6 months. Administer 30 minutes prior to infusion 2 capsule 0    ketoconazole (NIZORAL) 2 % external cream Apply topically daily for 14 days. 30 g 1    methylPREDNISolone Na Suc (solu-MEDROL) 125 mg in sodium chloride 0.9 % 12.5 mL injection Inject 125 mg over 3-5 minutes into the vein via push every 6 months. Administer 30 minutes prior to infusion 49000 mL 0    ocrelizumab (OCREVUS) 300 MG/10ML SOLN injection Inject 20 mLs (600 mg) into the vein every 6 months      ocrelizumab (OCREVUS) 600 mg in sodium chloride 0.9 % 500 mL via CADD pump Infuse 600 mg over 3.5 hours into the vein every 6 months. Allow bag to come to room temperature. Continuous rate: 40 mL/hr x30 min, 80 mL/hr x30 min, 120 mL/hr x30 min, 160 mL/hr x30 min, 200 mL/hr until complete. Flush bag with 20 mL NS to flush tubing.  Do not shake. 392542 mL 0    sodium chloride, PF, 0.9% PF flush Inject 10 mLs into the vein as needed for line flush. Flush IV before and after medication administration as directed and/or at least every 12 hours. 302146 mL 0    vitamin D3 (CHOLECALCIFEROL) 1.25 MG (37285 UT) capsule Take 1 capsule (50,000 Units) by mouth once a week. 12 capsule 3     No current facility-administered medications for this visit.           Review of Systems  Constitutional, neuro, ENT, endocrine, pulmonary, cardiac, gastrointestinal, genitourinary, musculoskeletal, integument and psychiatric systems are negative, except as otherwise noted.      Objective    /84 (BP Location: Left arm, Patient Position: Sitting, Cuff Size: Adult Large)   Pulse 72   Resp 16   Ht 1.88 m (6' 2\")   Wt 117.3 kg (258 lb 9.6 oz)   SpO2 98%   BMI 33.20 kg/m    Body mass " index is 33.2 kg/m .  Physical Exam   GENERAL: alert and no distress  MS: Small crack on the proximal end of pinky on the fold.  No erythema or scaling            Signed Electronically by: Earle Lindsay MD

## 2025-01-21 PROCEDURE — E0781 EXTERNAL AMBULATORY INFUS PU: HCPCS | Mod: RR

## 2025-01-22 PROCEDURE — E0781 EXTERNAL AMBULATORY INFUS PU: HCPCS | Mod: RR

## 2025-01-23 PROCEDURE — E0781 EXTERNAL AMBULATORY INFUS PU: HCPCS | Mod: RR

## 2025-01-24 PROCEDURE — E0781 EXTERNAL AMBULATORY INFUS PU: HCPCS | Mod: RR

## 2025-01-25 PROCEDURE — E0781 EXTERNAL AMBULATORY INFUS PU: HCPCS | Mod: RR

## 2025-01-26 PROCEDURE — E0781 EXTERNAL AMBULATORY INFUS PU: HCPCS | Mod: RR

## 2025-01-27 PROCEDURE — E0781 EXTERNAL AMBULATORY INFUS PU: HCPCS | Mod: RR

## 2025-01-28 PROCEDURE — E0781 EXTERNAL AMBULATORY INFUS PU: HCPCS | Mod: RR

## 2025-01-29 PROCEDURE — E0781 EXTERNAL AMBULATORY INFUS PU: HCPCS | Mod: RR

## 2025-01-30 PROCEDURE — E0781 EXTERNAL AMBULATORY INFUS PU: HCPCS | Mod: RR

## 2025-01-31 PROCEDURE — E0781 EXTERNAL AMBULATORY INFUS PU: HCPCS | Mod: RR

## 2025-02-01 PROCEDURE — E0781 EXTERNAL AMBULATORY INFUS PU: HCPCS | Mod: RR

## 2025-02-02 PROCEDURE — E0781 EXTERNAL AMBULATORY INFUS PU: HCPCS | Mod: RR

## 2025-02-03 PROCEDURE — E0781 EXTERNAL AMBULATORY INFUS PU: HCPCS | Mod: RR

## 2025-02-04 PROCEDURE — E0781 EXTERNAL AMBULATORY INFUS PU: HCPCS | Mod: RR

## 2025-02-05 PROCEDURE — E0781 EXTERNAL AMBULATORY INFUS PU: HCPCS | Mod: RR

## 2025-02-06 PROCEDURE — E0781 EXTERNAL AMBULATORY INFUS PU: HCPCS | Mod: RR

## 2025-02-07 PROCEDURE — E0781 EXTERNAL AMBULATORY INFUS PU: HCPCS | Mod: RR

## 2025-02-08 PROCEDURE — E0781 EXTERNAL AMBULATORY INFUS PU: HCPCS | Mod: RR

## 2025-02-09 PROCEDURE — E0781 EXTERNAL AMBULATORY INFUS PU: HCPCS | Mod: RR

## 2025-02-10 ENCOUNTER — OFFICE VISIT (OUTPATIENT)
Dept: NEUROLOGY | Facility: CLINIC | Age: 45
End: 2025-02-10
Payer: COMMERCIAL

## 2025-02-10 VITALS — SYSTOLIC BLOOD PRESSURE: 133 MMHG | DIASTOLIC BLOOD PRESSURE: 75 MMHG | HEART RATE: 67 BPM

## 2025-02-10 DIAGNOSIS — G35 MS (MULTIPLE SCLEROSIS) (H): Primary | ICD-10-CM

## 2025-02-10 PROCEDURE — 99214 OFFICE O/P EST MOD 30 MIN: CPT | Performed by: PSYCHIATRY & NEUROLOGY

## 2025-02-10 PROCEDURE — E0781 EXTERNAL AMBULATORY INFUS PU: HCPCS | Mod: RR

## 2025-02-10 NOTE — LETTER
2/10/2025      Barrington Cárdenas  1929 [facility] [address]      Dear Colleague,    Thank you for referring your patient, Barrington Cárdenas, to the [facility]. Please see a copy of my visit note below.    Date of Service: 2/10/2025    [facility] Neurology   MS Clinic Evaluation    Subjective: 44-year-old otherwise healthy man who presents in follow-up for multiple sclerosis.    He does not report any discrete new symptoms related to multiple sclerosis.  Specifically no change in sensation, bladder or bowel control, or balance.  He does have a residual scotoma in the right eye from his event of optic neuritis.    He continues to receive Ocrevus.  His last dose was this administered on January 3.  He tolerated this well without side effects.  He has not had any major issues with infections, though did struggle with a bout of athlete's foot that took some time to recover.    His wife reports that he will twitch at night.  This can sometimes interfere with her sleep.  He denies any impairment of sleep.  He is unaware of the twitching.  He denies any discomfort in the lower extremities such as aching or dysesthesias.  He will only have aching after exercise or position related.  He feels well rested in the morning when he gets up.  He does not need naps during the day.    He reports some difficulty remembering names at work.  This has been something that he has observed in the past couple weeks.  It is in the setting of heightened stress at work.  He notes that at baseline he struggles to recall names, but notes that this is a departure from his baseline.  He is open to undergoing a baseline neuropsychologic evaluation.      Disease onset: age 43, R ON  Last relapse: same    DMD hx:   Ocrevus 12/8/23-present< LD 1/3/2025    No Known Allergies    Current Outpatient Medications   Medication Sig Dispense Refill     acetaminophen (TYLENOL) 325 MG tablet Take 2 tablets  (650 mg) by mouth every 6 months. Administer 30 minutes prior to infusion 2 tablet 0     diphenhydrAMINE (BENADRYL) 25 MG capsule Take 2 capsules (50 mg) by mouth every 6 months. Administer 30 minutes prior to infusion 2 capsule 0     methylPREDNISolone Na Suc (solu-MEDROL) 125 mg in sodium chloride 0.9 % 12.5 mL injection Inject 125 mg over 3-5 minutes into the vein via push every 6 months. Administer 30 minutes prior to infusion 94005 mL 0     ocrelizumab (OCREVUS) 300 MG/10ML SOLN injection Inject 20 mLs (600 mg) into the vein every 6 months       ocrelizumab (OCREVUS) 600 mg in sodium chloride 0.9 % 500 mL via CADD pump Infuse 600 mg over 3.5 hours into the vein every 6 months. Allow bag to come to room temperature. Continuous rate: 40 mL/hr x30 min, 80 mL/hr x30 min, 120 mL/hr x30 min, 160 mL/hr x30 min, 200 mL/hr until complete. Flush bag with 20 mL NS to flush tubing.  Do not shake. 986394 mL 0     sodium chloride, PF, 0.9% PF flush Inject 10 mLs into the vein as needed for line flush. Flush IV before and after medication administration as directed and/or at least every 12 hours. 528736 mL 0     vitamin D3 (CHOLECALCIFEROL) 1.25 MG (11739 UT) capsule Take 1 capsule (50,000 Units) by mouth once a week. 12 capsule 3     No current facility-administered medications for this visit.        Past medical, surgical, social and family history was personally reviewed. Pertinent details noted above.     Physical Examination:   /75 (BP Location: Right arm, Patient Position: Sitting, Cuff Size: Adult Large)   Pulse 67     General: no acute distress  Cranial nerves:   VFFC  PER  EOM full w/no LELIA   Face symmetric  Hearing intact  No dysarthria   Motor:   Tone is normal   Bulk is normal                           R          L  Deltoid             5          5  Biceps             5          5  Triceps  5 5  Wrist ext 5 5  Finger ext 5 5  Finger abd 5 5     Hip flexion 5 5  Knee flexion 5 5  Knee ext 5 5  Ankle  d/f 5 5     Reflexes: 2+ and symmetric throughout, babinski absent bilaterally  Sensory: vibration is mildly reduced in the toes, JPS normal in the toes   Romberg is absent  Coordination: no ataxia or dysmetria  Gait: normal base and stride, tandem gait is intact, able to balance on one foot and hop x 5 bilaterally    Tests/Imaging:   CSF 7 ocb     Aqp4, mog neg    B12 1156  Vitamin D 20-> 65  JCV Ab 0.21    Igg 785-> 900  ALC 1700-> 1500  Cd19 43    MRI Brain  10/2023 - moderate to severe lesion burden with mix of periventricular lesions, deep white matter and juxtacortical lesions, R ON gd+  8/2024 - no new lesions, gd-     MRI Cervical spine   10/2023 - 1-2 small lesions, gd-   8/2024 - no new lesions    MRI Thoracic spine   10/2023 - no definite lesions     Assessment: 44-year-old man with relapsing remitting multiple sclerosis who is clinically stable on ocrelizumab.    I recommended that he continue with standard dosing of ocrelizumab.  Blood work will be due on the day of infusion.  MRI is advised for radiologic surveillance in 6 months.    Regarding the twitching at night, this could either be muscle spasms related to multiple sclerosis or a form of periodic limb movements of sleep.  Treatment options could include baclofen or gabapentin.  He may also consider a trial of magnesium.  We discussed that treatment is only indicated if sleep is being impaired.  Certainly, partners sleep can be an indication to start treatment.    Plan:   -Continue Ocrevus  - Blood work on the day of infusion  - MRI in 6 months  - Follow-up after MRI    Note was completed with the assistance of Dragon Fluency software which can often result in accidental word substitutions.     A total of 30 minutes on the date of service were spent in the care of this patient.   Jael Mchugh MD on 2/10/2025 at 9:38 AM              Again, thank you for allowing me to participate in the care of your patient.         Sincerely,        Jael Mchugh MD    Electronically signed

## 2025-02-10 NOTE — PROGRESS NOTES
Date of Service: 2/10/2025    [facility] Neurology   MS Clinic Evaluation    Subjective: 44-year-old otherwise healthy man who presents in follow-up for multiple sclerosis.    He does not report any discrete new symptoms related to multiple sclerosis.  Specifically no change in sensation, bladder or bowel control, or balance.  He does have a residual scotoma in the right eye from his event of optic neuritis.    He continues to receive Ocrevus.  His last dose was this administered on January 3.  He tolerated this well without side effects.  He has not had any major issues with infections, though did struggle with a bout of athlete's foot that took some time to recover.    His wife reports that he will twitch at night.  This can sometimes interfere with her sleep.  He denies any impairment of sleep.  He is unaware of the twitching.  He denies any discomfort in the lower extremities such as aching or dysesthesias.  He will only have aching after exercise or position related.  He feels well rested in the morning when he gets up.  He does not need naps during the day.    He reports some difficulty remembering names at work.  This has been something that he has observed in the past couple weeks.  It is in the setting of heightened stress at work.  He notes that at baseline he struggles to recall names, but notes that this is a departure from his baseline.  He is open to undergoing a baseline neuropsychologic evaluation.      Disease onset: age 43, R ON  Last relapse: same    DMD hx:   Ocrevus 12/8/23-present< LD 1/3/2025    No Known Allergies    Current Outpatient Medications   Medication Sig Dispense Refill    acetaminophen (TYLENOL) 325 MG tablet Take 2 tablets (650 mg) by mouth every 6 months. Administer 30 minutes prior to infusion 2 tablet 0    diphenhydrAMINE (BENADRYL) 25 MG capsule Take 2 capsules (50 mg) by mouth every 6 months. Administer 30 minutes prior to infusion 2 capsule 0    methylPREDNISolone Na Suc  (solu-MEDROL) 125 mg in sodium chloride 0.9 % 12.5 mL injection Inject 125 mg over 3-5 minutes into the vein via push every 6 months. Administer 30 minutes prior to infusion 04338 mL 0    ocrelizumab (OCREVUS) 300 MG/10ML SOLN injection Inject 20 mLs (600 mg) into the vein every 6 months      ocrelizumab (OCREVUS) 600 mg in sodium chloride 0.9 % 500 mL via CADD pump Infuse 600 mg over 3.5 hours into the vein every 6 months. Allow bag to come to room temperature. Continuous rate: 40 mL/hr x30 min, 80 mL/hr x30 min, 120 mL/hr x30 min, 160 mL/hr x30 min, 200 mL/hr until complete. Flush bag with 20 mL NS to flush tubing.  Do not shake. 324114 mL 0    sodium chloride, PF, 0.9% PF flush Inject 10 mLs into the vein as needed for line flush. Flush IV before and after medication administration as directed and/or at least every 12 hours. 154307 mL 0    vitamin D3 (CHOLECALCIFEROL) 1.25 MG (98467 UT) capsule Take 1 capsule (50,000 Units) by mouth once a week. 12 capsule 3     No current facility-administered medications for this visit.        Past medical, surgical, social and family history was personally reviewed. Pertinent details noted above.     Physical Examination:   /75 (BP Location: Right arm, Patient Position: Sitting, Cuff Size: Adult Large)   Pulse 67     General: no acute distress  Cranial nerves:   VFFC  PER  EOM full w/no LELIA   Face symmetric  Hearing intact  No dysarthria   Motor:   Tone is normal   Bulk is normal                           R          L  Deltoid             5          5  Biceps             5          5  Triceps  5 5  Wrist ext 5 5  Finger ext 5 5  Finger abd 5 5     Hip flexion 5 5  Knee flexion 5 5  Knee ext 5 5  Ankle d/f 5 5     Reflexes: 2+ and symmetric throughout, babinski absent bilaterally  Sensory: vibration is mildly reduced in the toes, JPS normal in the toes   Romberg is absent  Coordination: no ataxia or dysmetria  Gait: normal base and stride, tandem gait is intact, able to  balance on one foot and hop x 5 bilaterally    Tests/Imaging:   CSF 7 ocb     Aqp4, mog neg    B12 1156  Vitamin D 20-> 65  JCV Ab 0.21    Igg 785-> 900  ALC 1700-> 1500  Cd19 43    MRI Brain  10/2023 - moderate to severe lesion burden with mix of periventricular lesions, deep white matter and juxtacortical lesions, R ON gd+  8/2024 - no new lesions, gd-     MRI Cervical spine   10/2023 - 1-2 small lesions, gd-   8/2024 - no new lesions    MRI Thoracic spine   10/2023 - no definite lesions     Assessment: 44-year-old man with relapsing remitting multiple sclerosis who is clinically stable on ocrelizumab.    I recommended that he continue with standard dosing of ocrelizumab.  Blood work will be due on the day of infusion.  MRI is advised for radiologic surveillance in 6 months.    Regarding the twitching at night, this could either be muscle spasms related to multiple sclerosis or a form of periodic limb movements of sleep.  Treatment options could include baclofen or gabapentin.  He may also consider a trial of magnesium.  We discussed that treatment is only indicated if sleep is being impaired.  Certainly, partners sleep can be an indication to start treatment.    Plan:   -Continue Ocrevus  - Blood work on the day of infusion  - MRI in 6 months  - Follow-up after MRI    Note was completed with the assistance of Dragon Fluency software which can often result in accidental word substitutions.     A total of 30 minutes on the date of service were spent in the care of this patient.   Jael Mchugh MD on 2/10/2025 at 9:38 AM

## 2025-02-10 NOTE — NURSING NOTE
Chief Complaint   Patient presents with    Follow Up       SWAPNA Vincent on 2/10/2025 at 9:28 AM

## 2025-02-10 NOTE — PATIENT INSTRUCTIONS
Your exam is great     Mri in 6 months     Neuropsychologic evaluation as baseline    Muscle twitching at night   Do not need to be treated unless affecting your sleep (doesn't sound like this is the case) or your partner's sleep   Options:   - magnesium 400 mg at bedtime, stop if routinely causing diarrhea   - baclofen (muscle relaxant)   - gabapentin (periodic limb movements of sleep)      Follow up in 6 months

## 2025-02-11 PROCEDURE — E0781 EXTERNAL AMBULATORY INFUS PU: HCPCS | Mod: RR

## 2025-02-12 PROCEDURE — E0781 EXTERNAL AMBULATORY INFUS PU: HCPCS | Mod: RR

## 2025-02-13 PROCEDURE — E0781 EXTERNAL AMBULATORY INFUS PU: HCPCS | Mod: RR

## 2025-02-14 PROCEDURE — E0781 EXTERNAL AMBULATORY INFUS PU: HCPCS | Mod: RR

## 2025-02-15 PROCEDURE — E0781 EXTERNAL AMBULATORY INFUS PU: HCPCS | Mod: RR

## 2025-02-16 PROCEDURE — E0781 EXTERNAL AMBULATORY INFUS PU: HCPCS | Mod: RR

## 2025-02-17 PROCEDURE — E0781 EXTERNAL AMBULATORY INFUS PU: HCPCS | Mod: RR

## 2025-02-18 PROCEDURE — E0781 EXTERNAL AMBULATORY INFUS PU: HCPCS | Mod: RR

## 2025-02-19 PROCEDURE — E0781 EXTERNAL AMBULATORY INFUS PU: HCPCS | Mod: RR

## 2025-02-20 PROCEDURE — E0781 EXTERNAL AMBULATORY INFUS PU: HCPCS | Mod: RR

## 2025-02-21 PROCEDURE — E0781 EXTERNAL AMBULATORY INFUS PU: HCPCS | Mod: RR

## 2025-02-22 PROCEDURE — E0781 EXTERNAL AMBULATORY INFUS PU: HCPCS | Mod: RR

## 2025-02-23 PROCEDURE — E0781 EXTERNAL AMBULATORY INFUS PU: HCPCS | Mod: RR

## 2025-02-24 PROCEDURE — E0781 EXTERNAL AMBULATORY INFUS PU: HCPCS | Mod: RR

## 2025-02-25 PROCEDURE — E0781 EXTERNAL AMBULATORY INFUS PU: HCPCS | Mod: RR

## 2025-02-26 PROCEDURE — E0781 EXTERNAL AMBULATORY INFUS PU: HCPCS | Mod: RR

## 2025-02-27 PROCEDURE — E0781 EXTERNAL AMBULATORY INFUS PU: HCPCS | Mod: RR

## 2025-02-28 PROCEDURE — E0781 EXTERNAL AMBULATORY INFUS PU: HCPCS | Mod: RR

## 2025-03-01 PROCEDURE — E0781 EXTERNAL AMBULATORY INFUS PU: HCPCS | Mod: RR

## 2025-03-02 PROCEDURE — E0781 EXTERNAL AMBULATORY INFUS PU: HCPCS | Mod: RR

## 2025-03-03 PROCEDURE — E0781 EXTERNAL AMBULATORY INFUS PU: HCPCS | Mod: RR

## 2025-03-04 PROCEDURE — E0781 EXTERNAL AMBULATORY INFUS PU: HCPCS | Mod: RR

## 2025-03-05 PROCEDURE — E0781 EXTERNAL AMBULATORY INFUS PU: HCPCS | Mod: RR

## 2025-03-06 PROCEDURE — E0781 EXTERNAL AMBULATORY INFUS PU: HCPCS | Mod: RR

## 2025-03-07 PROCEDURE — E0781 EXTERNAL AMBULATORY INFUS PU: HCPCS | Mod: RR

## 2025-03-08 PROCEDURE — E0781 EXTERNAL AMBULATORY INFUS PU: HCPCS | Mod: RR

## 2025-03-09 PROCEDURE — E0781 EXTERNAL AMBULATORY INFUS PU: HCPCS | Mod: RR

## 2025-03-10 PROCEDURE — E0781 EXTERNAL AMBULATORY INFUS PU: HCPCS | Mod: RR

## 2025-03-11 PROCEDURE — E0781 EXTERNAL AMBULATORY INFUS PU: HCPCS | Mod: RR

## 2025-03-12 PROCEDURE — E0781 EXTERNAL AMBULATORY INFUS PU: HCPCS | Mod: RR

## 2025-03-13 PROCEDURE — E0781 EXTERNAL AMBULATORY INFUS PU: HCPCS | Mod: RR

## 2025-03-14 PROCEDURE — E0781 EXTERNAL AMBULATORY INFUS PU: HCPCS | Mod: RR

## 2025-03-15 PROCEDURE — E0781 EXTERNAL AMBULATORY INFUS PU: HCPCS | Mod: RR

## 2025-03-16 PROCEDURE — E0781 EXTERNAL AMBULATORY INFUS PU: HCPCS | Mod: RR

## 2025-03-17 PROCEDURE — E0781 EXTERNAL AMBULATORY INFUS PU: HCPCS | Mod: RR

## 2025-03-18 PROCEDURE — E0781 EXTERNAL AMBULATORY INFUS PU: HCPCS | Mod: RR

## 2025-03-19 PROCEDURE — E0781 EXTERNAL AMBULATORY INFUS PU: HCPCS | Mod: RR

## 2025-03-20 PROCEDURE — E0781 EXTERNAL AMBULATORY INFUS PU: HCPCS | Mod: RR

## 2025-03-21 PROCEDURE — E0781 EXTERNAL AMBULATORY INFUS PU: HCPCS | Mod: RR

## 2025-03-22 PROCEDURE — E0781 EXTERNAL AMBULATORY INFUS PU: HCPCS | Mod: RR

## 2025-03-23 PROCEDURE — E0781 EXTERNAL AMBULATORY INFUS PU: HCPCS | Mod: RR

## 2025-03-24 PROCEDURE — E0781 EXTERNAL AMBULATORY INFUS PU: HCPCS | Mod: RR

## 2025-03-25 PROCEDURE — E0781 EXTERNAL AMBULATORY INFUS PU: HCPCS | Mod: RR

## 2025-03-26 PROCEDURE — E0781 EXTERNAL AMBULATORY INFUS PU: HCPCS | Mod: RR

## 2025-03-27 PROCEDURE — E0781 EXTERNAL AMBULATORY INFUS PU: HCPCS | Mod: RR

## 2025-03-28 PROCEDURE — E0781 EXTERNAL AMBULATORY INFUS PU: HCPCS | Mod: RR

## 2025-03-29 PROCEDURE — E0781 EXTERNAL AMBULATORY INFUS PU: HCPCS | Mod: RR

## 2025-03-30 PROCEDURE — E0781 EXTERNAL AMBULATORY INFUS PU: HCPCS | Mod: RR

## 2025-03-31 PROCEDURE — E0781 EXTERNAL AMBULATORY INFUS PU: HCPCS | Mod: RR

## 2025-04-01 PROCEDURE — E0781 EXTERNAL AMBULATORY INFUS PU: HCPCS | Mod: RR

## 2025-04-02 PROCEDURE — E0781 EXTERNAL AMBULATORY INFUS PU: HCPCS | Mod: RR

## 2025-04-03 PROCEDURE — E0781 EXTERNAL AMBULATORY INFUS PU: HCPCS | Mod: RR

## 2025-04-04 PROCEDURE — E0781 EXTERNAL AMBULATORY INFUS PU: HCPCS | Mod: RR

## 2025-04-05 PROCEDURE — E0781 EXTERNAL AMBULATORY INFUS PU: HCPCS | Mod: RR

## 2025-04-06 PROCEDURE — E0781 EXTERNAL AMBULATORY INFUS PU: HCPCS | Mod: RR

## 2025-04-07 PROCEDURE — E0781 EXTERNAL AMBULATORY INFUS PU: HCPCS | Mod: RR

## 2025-04-08 PROCEDURE — E0781 EXTERNAL AMBULATORY INFUS PU: HCPCS | Mod: RR

## 2025-04-09 PROCEDURE — E0781 EXTERNAL AMBULATORY INFUS PU: HCPCS | Mod: RR

## 2025-04-10 PROCEDURE — E0781 EXTERNAL AMBULATORY INFUS PU: HCPCS | Mod: RR

## 2025-04-11 PROCEDURE — E0781 EXTERNAL AMBULATORY INFUS PU: HCPCS | Mod: RR

## 2025-04-12 PROCEDURE — E0781 EXTERNAL AMBULATORY INFUS PU: HCPCS | Mod: RR

## 2025-04-13 PROCEDURE — E0781 EXTERNAL AMBULATORY INFUS PU: HCPCS | Mod: RR

## 2025-04-14 PROCEDURE — E0781 EXTERNAL AMBULATORY INFUS PU: HCPCS | Mod: RR

## 2025-04-15 PROCEDURE — E0781 EXTERNAL AMBULATORY INFUS PU: HCPCS | Mod: RR

## 2025-04-16 PROCEDURE — E0781 EXTERNAL AMBULATORY INFUS PU: HCPCS | Mod: RR

## 2025-04-17 PROCEDURE — E0781 EXTERNAL AMBULATORY INFUS PU: HCPCS | Mod: RR

## 2025-04-18 PROCEDURE — E0781 EXTERNAL AMBULATORY INFUS PU: HCPCS | Mod: RR

## 2025-04-19 PROCEDURE — E0781 EXTERNAL AMBULATORY INFUS PU: HCPCS | Mod: RR

## 2025-04-20 PROCEDURE — E0781 EXTERNAL AMBULATORY INFUS PU: HCPCS | Mod: RR

## 2025-04-21 PROCEDURE — E0781 EXTERNAL AMBULATORY INFUS PU: HCPCS | Mod: RR

## 2025-04-22 PROCEDURE — E0781 EXTERNAL AMBULATORY INFUS PU: HCPCS | Mod: RR

## 2025-04-23 PROCEDURE — E0781 EXTERNAL AMBULATORY INFUS PU: HCPCS | Mod: RR

## 2025-04-24 PROCEDURE — E0781 EXTERNAL AMBULATORY INFUS PU: HCPCS | Mod: RR

## 2025-04-25 PROCEDURE — E0781 EXTERNAL AMBULATORY INFUS PU: HCPCS | Mod: RR

## 2025-04-26 PROCEDURE — E0781 EXTERNAL AMBULATORY INFUS PU: HCPCS | Mod: RR

## 2025-04-27 PROCEDURE — E0781 EXTERNAL AMBULATORY INFUS PU: HCPCS | Mod: RR

## 2025-04-28 PROCEDURE — E0781 EXTERNAL AMBULATORY INFUS PU: HCPCS | Mod: RR

## 2025-04-29 PROCEDURE — E0781 EXTERNAL AMBULATORY INFUS PU: HCPCS | Mod: RR

## 2025-04-30 PROCEDURE — E0781 EXTERNAL AMBULATORY INFUS PU: HCPCS | Mod: RR

## 2025-05-01 PROCEDURE — E0781 EXTERNAL AMBULATORY INFUS PU: HCPCS | Mod: RR

## 2025-05-02 PROCEDURE — E0781 EXTERNAL AMBULATORY INFUS PU: HCPCS | Mod: RR

## 2025-05-03 PROCEDURE — E0781 EXTERNAL AMBULATORY INFUS PU: HCPCS | Mod: RR

## 2025-05-04 PROCEDURE — E0781 EXTERNAL AMBULATORY INFUS PU: HCPCS | Mod: RR

## 2025-05-05 PROCEDURE — E0781 EXTERNAL AMBULATORY INFUS PU: HCPCS | Mod: RR

## 2025-05-06 PROCEDURE — E0781 EXTERNAL AMBULATORY INFUS PU: HCPCS | Mod: RR

## 2025-05-07 PROCEDURE — E0781 EXTERNAL AMBULATORY INFUS PU: HCPCS | Mod: RR

## 2025-05-08 PROCEDURE — E0781 EXTERNAL AMBULATORY INFUS PU: HCPCS | Mod: RR

## 2025-05-09 PROCEDURE — E0781 EXTERNAL AMBULATORY INFUS PU: HCPCS | Mod: RR

## 2025-05-10 PROCEDURE — E0781 EXTERNAL AMBULATORY INFUS PU: HCPCS | Mod: RR

## 2025-05-11 PROCEDURE — E0781 EXTERNAL AMBULATORY INFUS PU: HCPCS | Mod: RR

## 2025-05-12 PROCEDURE — E0781 EXTERNAL AMBULATORY INFUS PU: HCPCS | Mod: RR

## 2025-05-13 PROCEDURE — E0781 EXTERNAL AMBULATORY INFUS PU: HCPCS | Mod: RR

## 2025-05-14 PROCEDURE — E0781 EXTERNAL AMBULATORY INFUS PU: HCPCS | Mod: RR

## 2025-05-15 PROCEDURE — E0781 EXTERNAL AMBULATORY INFUS PU: HCPCS | Mod: RR

## 2025-05-16 PROCEDURE — E0781 EXTERNAL AMBULATORY INFUS PU: HCPCS | Mod: RR

## 2025-05-17 PROCEDURE — E0781 EXTERNAL AMBULATORY INFUS PU: HCPCS | Mod: RR

## 2025-05-18 PROCEDURE — E0781 EXTERNAL AMBULATORY INFUS PU: HCPCS | Mod: RR

## 2025-05-19 ENCOUNTER — VIRTUAL VISIT (OUTPATIENT)
Dept: PHARMACY | Facility: OTHER | Age: 45
End: 2025-05-19
Attending: PSYCHIATRY & NEUROLOGY
Payer: COMMERCIAL

## 2025-05-19 DIAGNOSIS — G35 MULTIPLE SCLEROSIS (H): Primary | ICD-10-CM

## 2025-05-19 PROCEDURE — E0781 EXTERNAL AMBULATORY INFUS PU: HCPCS | Mod: RR

## 2025-05-19 NOTE — PATIENT INSTRUCTIONS
"Recommendations from today's MTM visit:                                                      Medication list updated and reviewed   No changes recommended at this time  Please have primary care provider order a vitamin D3 lab to be checked     Follow-up:   Appointments in Next Year      Jul 07, 2025 8:30 AM  AIS Infusion Visit with [facility] INFUSION CHAIR 2  [facility] ([facility]) [phone]     Jan 13, 2026 10:30 AM  (Arrive by 10:10 AM)  Preventative Adult Visit with Earle Lindsay MD  [facility] ([facility] ) [phone]     Jan 26, 2026 12:30 PM  Neuropsych Eval with Radha Cabral Psy.D, LP  [facility]rology Clinic [facility] ([facility]) [phone]              It was great speaking with you today.  I value your experience and would be very thankful for your time in providing feedback in our clinic survey. In the next few days, you may receive an email or text message from [employer] with a link to a survey related to your  clinical pharmacist.\"     To schedule another MTM appointment, please call the clinic directly or you may call the MTM scheduling line at [phone].    My Clinical Pharmacist's contact information:                                                      Please feel free to contact me with any questions or concerns you have.      Cris Dawson, Pharm.D., MPH  Medication Therapy Management Pharmacist   [facility] Neurology [facility]   "

## 2025-05-19 NOTE — PROGRESS NOTES
Medication Therapy Management (MTM) Encounter    ASSESSMENT:                            Medication Adherence/Access: No issues identified.    MS:  Tolerating infusions. No changes recommended to his infusion. Encouraged patient to follow up with repeat vitamin D3 labs to assess for appropriate dose with primary care.     PLAN:                            Medication list updated and reviewed   No changes recommended at this time  Please have primary care provider order a vitamin D3 lab to be checked     Follow-up:   Appointments in Next Year      Jul 07, 2025 8:30 AM  AIS Infusion Visit with [facility] INFUSION CHAIR 2  [facility] ([facility] [employer] [employer] [facility] [employer] [phone]     Jan 13, 2026 10:30 AM  (Arrive by 10:10 AM)  Preventative Adult Visit with Earle Lindsay MD  [facility] ([facility] ) [phone]     Jan 26, 2026 12:30 PM  Neuropsych Eval with Radha Cabral Psy.D, LP  [facility]rology [facility] ([facility]) [phone]            SUBJECTIVE/OBJECTIVE:                          Andrey Cárdenas is a 44 year old male seen for a follow-up visit.       Reason for visit: Infusion Follow Up.    Allergies/ADRs: Reviewed in chart  Past Medical History: Reviewed in chart  Tobacco: He reports that he has never smoked. He quit smokeless tobacco use about 19 years ago.  His smokeless tobacco use included chew.  Alcohol: Varies  Additional Providers: Neurologist: Dr. Mchugh    Medication Adherence/Access: no issues reported.    MS:   - Ocrevus 600mg infusion every 6 months. Last infusion was 1/3/25 with [facility].     He tolerates infusions well. No questions or concerns about the infusions at this time.     His primary care provider was wondering about if patient needs a vitamin D3 level redrawn and dose assessment. Patient has no concerns but notes it was something his PCP did mention to  him.     Medication History:   - Ocrevus 12/8/23-present      Today's Vitals: There were no vitals taken for this visit.  ----------------    I spent 10 minutes with this patient today. All changes were made via collaborative practice agreement with Jael Mchugh.     A summary of these recommendations was sent via [employer].    Cris Dawson, Pharm.D., MPH  Medication Therapy Management Pharmacist   [facility]    Telemedicine Visit Details  The patient's medications can be safely assessed via a telemedicine encounter.  Type of service:  Video Conference via [employer]  Originating Location (pt. Location): Home    Distant Location (provider location):  Off-site  Start Time: 8:00 AM  End Time: 8:10 AM     Medication Therapy Recommendations  No medication therapy recommendations to display

## 2025-05-20 PROCEDURE — E0781 EXTERNAL AMBULATORY INFUS PU: HCPCS | Mod: RR

## 2025-05-21 PROCEDURE — E0781 EXTERNAL AMBULATORY INFUS PU: HCPCS | Mod: RR

## 2025-05-22 PROCEDURE — E0781 EXTERNAL AMBULATORY INFUS PU: HCPCS | Mod: RR

## 2025-05-23 PROCEDURE — E0781 EXTERNAL AMBULATORY INFUS PU: HCPCS | Mod: RR

## 2025-05-24 PROCEDURE — E0781 EXTERNAL AMBULATORY INFUS PU: HCPCS | Mod: RR

## 2025-05-25 PROCEDURE — E0781 EXTERNAL AMBULATORY INFUS PU: HCPCS | Mod: RR

## 2025-05-26 PROCEDURE — E0781 EXTERNAL AMBULATORY INFUS PU: HCPCS | Mod: RR

## 2025-05-27 PROCEDURE — E0781 EXTERNAL AMBULATORY INFUS PU: HCPCS | Mod: RR

## 2025-05-28 PROCEDURE — E0781 EXTERNAL AMBULATORY INFUS PU: HCPCS | Mod: RR

## 2025-05-29 PROCEDURE — E0781 EXTERNAL AMBULATORY INFUS PU: HCPCS | Mod: RR

## 2025-05-30 PROCEDURE — E0781 EXTERNAL AMBULATORY INFUS PU: HCPCS | Mod: RR

## 2025-05-31 PROCEDURE — E0781 EXTERNAL AMBULATORY INFUS PU: HCPCS | Mod: RR

## 2025-06-01 PROCEDURE — E0781 EXTERNAL AMBULATORY INFUS PU: HCPCS | Mod: RR

## 2025-06-02 PROCEDURE — E0781 EXTERNAL AMBULATORY INFUS PU: HCPCS | Mod: RR

## 2025-06-03 PROCEDURE — E0781 EXTERNAL AMBULATORY INFUS PU: HCPCS | Mod: RR

## 2025-06-04 PROCEDURE — E0781 EXTERNAL AMBULATORY INFUS PU: HCPCS | Mod: RR

## 2025-06-05 PROCEDURE — E0781 EXTERNAL AMBULATORY INFUS PU: HCPCS | Mod: RR

## 2025-06-06 PROCEDURE — E0781 EXTERNAL AMBULATORY INFUS PU: HCPCS | Mod: RR

## 2025-06-07 PROCEDURE — E0781 EXTERNAL AMBULATORY INFUS PU: HCPCS | Mod: RR

## 2025-06-08 PROCEDURE — E0781 EXTERNAL AMBULATORY INFUS PU: HCPCS | Mod: RR

## 2025-06-09 PROCEDURE — E0781 EXTERNAL AMBULATORY INFUS PU: HCPCS | Mod: RR

## 2025-06-10 PROCEDURE — E0781 EXTERNAL AMBULATORY INFUS PU: HCPCS | Mod: RR

## 2025-06-11 PROCEDURE — E0781 EXTERNAL AMBULATORY INFUS PU: HCPCS | Mod: RR

## 2025-06-12 PROCEDURE — E0781 EXTERNAL AMBULATORY INFUS PU: HCPCS | Mod: RR

## 2025-06-13 PROCEDURE — E0781 EXTERNAL AMBULATORY INFUS PU: HCPCS | Mod: RR

## 2025-06-14 PROCEDURE — E0781 EXTERNAL AMBULATORY INFUS PU: HCPCS | Mod: RR

## 2025-06-15 PROCEDURE — E0781 EXTERNAL AMBULATORY INFUS PU: HCPCS | Mod: RR

## 2025-06-16 PROCEDURE — E0781 EXTERNAL AMBULATORY INFUS PU: HCPCS | Mod: RR

## 2025-06-17 ENCOUNTER — EPISODE UPDATE (OUTPATIENT)
Dept: HOME HEALTH SERVICES | Facility: HOME HEALTH | Age: 45
End: 2025-06-17
Payer: COMMERCIAL

## 2025-06-17 PROCEDURE — E0781 EXTERNAL AMBULATORY INFUS PU: HCPCS | Mod: RR

## 2025-06-18 PROCEDURE — E0781 EXTERNAL AMBULATORY INFUS PU: HCPCS | Mod: RR

## 2025-06-19 PROCEDURE — E0781 EXTERNAL AMBULATORY INFUS PU: HCPCS | Mod: RR

## 2025-07-01 ENCOUNTER — HOME INFUSION (OUTPATIENT)
Dept: HOME HEALTH SERVICES | Facility: HOME HEALTH | Age: 45
End: 2025-07-01
Payer: COMMERCIAL

## 2025-07-07 ENCOUNTER — HOME INFUSION BILLING (OUTPATIENT)
Dept: HOME HEALTH SERVICES | Facility: HOME HEALTH | Age: 45
End: 2025-07-07
Payer: COMMERCIAL

## 2025-07-07 ENCOUNTER — LAB REQUISITION (OUTPATIENT)
Dept: LAB | Facility: CLINIC | Age: 45
End: 2025-07-07
Payer: COMMERCIAL

## 2025-07-07 ENCOUNTER — HOME CARE VISIT (OUTPATIENT)
Dept: HOME HEALTH SERVICES | Facility: HOME HEALTH | Age: 45
End: 2025-07-07
Payer: COMMERCIAL

## 2025-07-07 VITALS
TEMPERATURE: 97.1 F | HEART RATE: 73 BPM | DIASTOLIC BLOOD PRESSURE: 70 MMHG | BODY MASS INDEX: 31.46 KG/M2 | OXYGEN SATURATION: 97 % | SYSTOLIC BLOOD PRESSURE: 128 MMHG | WEIGHT: 245 LBS | RESPIRATION RATE: 16 BRPM

## 2025-07-07 DIAGNOSIS — G35 MULTIPLE SCLEROSIS (H): ICD-10-CM

## 2025-07-07 DIAGNOSIS — G35 MS (MULTIPLE SCLEROSIS) (H): Primary | ICD-10-CM

## 2025-07-07 LAB
BASOPHILS # BLD AUTO: 0.1 10E3/UL (ref 0–0.2)
BASOPHILS NFR BLD AUTO: 2 %
CD19 B CELL COMMENT: ABNORMAL
CD19 CELLS # BLD: 12 CELLS/UL (ref 107–698)
CD19 CELLS NFR BLD: 1 % (ref 6–27)
EOSINOPHIL # BLD AUTO: 0.5 10E3/UL (ref 0–0.7)
EOSINOPHIL NFR BLD AUTO: 7 %
ERYTHROCYTE [DISTWIDTH] IN BLOOD BY AUTOMATED COUNT: 13.6 % (ref 10–15)
HCT VFR BLD AUTO: 44.3 % (ref 40–53)
HGB BLD-MCNC: 15.3 G/DL (ref 13.3–17.7)
IGG SERPL-MCNC: 879 MG/DL (ref 610–1616)
IMM GRANULOCYTES # BLD: 0 10E3/UL
IMM GRANULOCYTES NFR BLD: 1 %
LYMPHOCYTES # BLD AUTO: 1.2 10E3/UL (ref 0.8–5.3)
LYMPHOCYTES NFR BLD AUTO: 19 %
MCH RBC QN AUTO: 30.7 PG (ref 26.5–33)
MCHC RBC AUTO-ENTMCNC: 34.5 G/DL (ref 31.5–36.5)
MCV RBC AUTO: 89 FL (ref 78–100)
MONOCYTES # BLD AUTO: 0.6 10E3/UL (ref 0–1.3)
MONOCYTES NFR BLD AUTO: 9 %
NEUTROPHILS # BLD AUTO: 4.2 10E3/UL (ref 1.6–8.3)
NEUTROPHILS NFR BLD AUTO: 63 %
NRBC # BLD AUTO: 0 10E3/UL
NRBC BLD AUTO-RTO: 0 /100
PLATELET # BLD AUTO: 224 10E3/UL (ref 150–450)
RBC # BLD AUTO: 4.98 10E6/UL (ref 4.4–5.9)
WBC # BLD AUTO: 6.7 10E3/UL (ref 4–11)

## 2025-07-07 PROCEDURE — S9338 HIT IMMUNOTHERAPY DIEM: HCPCS

## 2025-07-07 PROCEDURE — A4215 STERILE NEEDLE: HCPCS

## 2025-07-07 PROCEDURE — 99602 HOME NFS VISIT EACH ADDL HR: CPT | Mod: SS

## 2025-07-07 PROCEDURE — S1015 IV TUBING EXTENSION SET: HCPCS

## 2025-07-07 PROCEDURE — 99601 HOME NFS VISIT <2 HRS: CPT | Mod: SS

## 2025-07-07 PROCEDURE — 86355 B CELLS TOTAL COUNT: CPT | Performed by: PSYCHIATRY & NEUROLOGY

## 2025-07-07 PROCEDURE — 85025 COMPLETE CBC W/AUTO DIFF WBC: CPT | Performed by: PSYCHIATRY & NEUROLOGY

## 2025-07-07 PROCEDURE — 82784 ASSAY IGA/IGD/IGG/IGM EACH: CPT | Performed by: PSYCHIATRY & NEUROLOGY

## 2025-07-07 NOTE — PROGRESS NOTES
Nursing Visit Note:  Nurse visit today for Ocrdeous for Barrington Cárdenas.     present during visit today: Not Applicable.    Intravenous Access:  Lab draw site left forearm , Attempts 1, Labs drawn without difficulty., and Peripheral IV placed.    Infusion Nursing Note:    Pre-infusion Checklist:   Have you had any delayed reaction since last infusion?   No     Have you recently had an elevated temperature, fever, chills productive cough, coughing for 3 weeks or longer or hemoptysis, abnormal vital signs, night sweats, chest pain, or have you noticed a decrease in your appetite, or noted unexplained weight loss or fatigue?   No     Do you have any open wounds or new incisions?  No     Do you have any recent or upcoming hospitalizations, surgeries, or dental procedures? Does not include esophagogastroduodenoscopy, colonoscopy, endoscopic retrograde cholangiopancreatography (ERCP), endoscopic ultrasound (EUS), dental procedures or joint aspiration/steroid injections.   No     Do you currently have or recently have had any signs of illness or infection or are you on any antibiotics?   No     Have you had any new, sudden, or worsening abdominal pain?   No     Have you or anyone in your household received a live vaccination in the past 4 weeks?   No     Have you recently been diagnosed with any new nervous system diseases or cancer diagnosis? (i.e., Multiple Sclerosis, Guillain [place], seizures, neurological changes) Are you receiving any form of radiation or chemotherapy?   No     Are you pregnant or breastfeeding, or do you have plans of pregnancy in the future?   No     Have you been having any signs of worsening depression or suicidal ideation?  No     Have you had any other new onset medical symptoms?  No    Entyvio/Ocrevus/Tyasabri only: Have you been having any new or worsening medical problems such as issues with thinking, eyesight, balance or strength that have persisted over several days?  "  No    Benlysta only: Have you been having any signs of worsening depression or suicidal ideations?    N/A    IVIG only: Have you had any new blood clots?  N/A    Did the patient answer \"YES\" to any of the questions above?  No     Will the patient receive a medication that has an order for infusion reaction management?  Yes, and all drugs and supplies are available and none have .     If ordered, has the patient taken pre-medications?  Yes    Plan:   Therapy is appropriate, will proceed with treatment.     Post Infusion Assessment:  Patient tolerated infusion without incident.  Patient observed for 60 minutes post Ocrevus per protocol.  Blood return noted pre and post infusion.  Access discontinued per protocol.      and Lab-Only Nursing Note    Labs obtained via VPT    Time Specimen drawn: 0847    Last dose (if applicable): No    Facility sent to: [email or web address]    [facility] Tracking number: [id number]    Note: patient has been feeling well since last infusion. MS symptoms have been stable and are at baseline - decreased vision in right eye due to history of optic nerve neuritis.     PIV was placed. Labs drawn - IgG, CD19 B cell count, and CBC d/p. 10 mL normal saline used.     Pre-medication given:  650 mg acetaminophen by mouth at 0849  50 mg diphenhydramine by mouth at 0849  125 mg methylprednisolone IVP 2225-0265 followed by 10 mL normal saline.     20 mL normal saline added to end of infusion.     Saline administered: 40 (ml)    Supply Check:   Does the patient have all the supplies they need for the next visit?  Yes    Next visit plan: 2026. Patient would to like switch to home.     Ann-Marie Pham RN 2025  "

## 2025-08-11 ENCOUNTER — HOSPITAL ENCOUNTER (OUTPATIENT)
Dept: MRI IMAGING | Facility: HOSPITAL | Age: 45
Discharge: HOME | End: 2025-08-11
Attending: PSYCHIATRY & NEUROLOGY | Admitting: PSYCHIATRY & NEUROLOGY
Payer: COMMERCIAL

## 2025-08-11 DIAGNOSIS — G35 MS (MULTIPLE SCLEROSIS) (H): ICD-10-CM

## 2025-08-11 PROCEDURE — 255N000002 HC RX 255 OP 636: Performed by: PSYCHIATRY & NEUROLOGY

## 2025-08-11 PROCEDURE — 72157 MRI CHEST SPINE W/O & W/DYE: CPT

## 2025-08-11 PROCEDURE — 70553 MRI BRAIN STEM W/O & W/DYE: CPT

## 2025-08-11 PROCEDURE — 72156 MRI NECK SPINE W/O & W/DYE: CPT

## 2025-08-11 PROCEDURE — A9585 GADOBUTROL INJECTION: HCPCS | Performed by: PSYCHIATRY & NEUROLOGY

## 2025-08-11 RX ORDER — GADOBUTROL 604.72 MG/ML
0.1 INJECTION INTRAVENOUS ONCE
Status: COMPLETED | OUTPATIENT
Start: 2025-08-11 | End: 2025-08-11

## 2025-08-11 RX ADMIN — GADOBUTROL 10 ML: 604.72 INJECTION INTRAVENOUS at 15:43
